# Patient Record
Sex: FEMALE | Race: WHITE | NOT HISPANIC OR LATINO | Employment: OTHER | URBAN - METROPOLITAN AREA
[De-identification: names, ages, dates, MRNs, and addresses within clinical notes are randomized per-mention and may not be internally consistent; named-entity substitution may affect disease eponyms.]

---

## 2017-11-07 RX ORDER — CLOBETASOL PROPIONATE 0.5 MG/G
CREAM TOPICAL 2 TIMES DAILY
Qty: 30 G | Refills: 0 | Status: SHIPPED | OUTPATIENT
Start: 2017-11-07 | End: 2018-01-11

## 2017-12-21 DIAGNOSIS — I48.91 ATRIAL FIBRILLATION, UNSPECIFIED TYPE: Primary | ICD-10-CM

## 2017-12-27 ENCOUNTER — TELEPHONE (OUTPATIENT)
Dept: INTERNAL MEDICINE | Facility: CLINIC | Age: 67
End: 2017-12-27

## 2017-12-27 NOTE — TELEPHONE ENCOUNTER
----- Message from Jordana Funez sent at 12/27/2017  3:28 PM CST -----  Contact: SARY SINGER [[id number]]  _  1st Request  _  2nd Request  _  3rd Request        Who:  SARY SINGER [[id number]]    Why: Patient states she would like a call back from Debra in regards to an appt    What Number to Call Back: [phone]    When to Expect a call back: (Before the end of the day)   -- if call after 3:00 call back will be tomorrow.

## 2018-01-08 ENCOUNTER — TELEPHONE (OUTPATIENT)
Dept: INTERNAL MEDICINE | Facility: CLINIC | Age: 68
End: 2018-01-08

## 2018-01-10 ENCOUNTER — TELEPHONE (OUTPATIENT)
Dept: INTERNAL MEDICINE | Facility: CLINIC | Age: 68
End: 2018-01-10

## 2018-01-10 DIAGNOSIS — Z12.39 SCREENING FOR BREAST CANCER: Primary | ICD-10-CM

## 2018-01-11 ENCOUNTER — LAB VISIT (OUTPATIENT)
Dept: LAB | Facility: OTHER | Age: 68
End: 2018-01-11
Attending: INTERNAL MEDICINE
Payer: MEDICARE

## 2018-01-11 ENCOUNTER — OFFICE VISIT (OUTPATIENT)
Dept: INTERNAL MEDICINE | Facility: CLINIC | Age: 68
End: 2018-01-11
Payer: COMMERCIAL

## 2018-01-11 VITALS
HEIGHT: 66 IN | DIASTOLIC BLOOD PRESSURE: 84 MMHG | HEART RATE: 71 BPM | WEIGHT: 209 LBS | SYSTOLIC BLOOD PRESSURE: 124 MMHG | BODY MASS INDEX: 33.59 KG/M2

## 2018-01-11 DIAGNOSIS — Z11.59 NEED FOR HEPATITIS C SCREENING TEST: ICD-10-CM

## 2018-01-11 DIAGNOSIS — I10 ESSENTIAL HYPERTENSION, BENIGN: Primary | Chronic | ICD-10-CM

## 2018-01-11 DIAGNOSIS — I48.0 PAROXYSMAL ATRIAL FIBRILLATION: Chronic | ICD-10-CM

## 2018-01-11 DIAGNOSIS — N95.9 POSTMENOPAUSAL SYMPTOMS: ICD-10-CM

## 2018-01-11 DIAGNOSIS — Z13.6 ENCOUNTER FOR LIPID SCREENING FOR CARDIOVASCULAR DISEASE: ICD-10-CM

## 2018-01-11 DIAGNOSIS — Z13.220 ENCOUNTER FOR LIPID SCREENING FOR CARDIOVASCULAR DISEASE: ICD-10-CM

## 2018-01-11 DIAGNOSIS — I10 ESSENTIAL HYPERTENSION, BENIGN: Chronic | ICD-10-CM

## 2018-01-11 DIAGNOSIS — Z12.31 ENCOUNTER FOR SCREENING MAMMOGRAM FOR BREAST CANCER: ICD-10-CM

## 2018-01-11 LAB
ALBUMIN SERPL BCP-MCNC: 3.8 G/DL
ALP SERPL-CCNC: 88 U/L
ALT SERPL W/O P-5'-P-CCNC: 19 U/L
ANION GAP SERPL CALC-SCNC: 8 MMOL/L
AST SERPL-CCNC: 19 U/L
BASOPHILS # BLD AUTO: 0.01 K/UL
BASOPHILS NFR BLD: 0.2 %
BILIRUB SERPL-MCNC: 0.4 MG/DL
BUN SERPL-MCNC: 16 MG/DL
CALCIUM SERPL-MCNC: 9 MG/DL
CHLORIDE SERPL-SCNC: 106 MMOL/L
CHOLEST SERPL-MCNC: 210 MG/DL
CHOLEST/HDLC SERPL: 3 {RATIO}
CO2 SERPL-SCNC: 21 MMOL/L
CREAT SERPL-MCNC: 0.8 MG/DL
DIFFERENTIAL METHOD: ABNORMAL
EOSINOPHIL # BLD AUTO: 0.1 K/UL
EOSINOPHIL NFR BLD: 1.1 %
ERYTHROCYTE [DISTWIDTH] IN BLOOD BY AUTOMATED COUNT: 14.6 %
EST. GFR  (AFRICAN AMERICAN): >60 ML/MIN/1.73 M^2
EST. GFR  (NON AFRICAN AMERICAN): >60 ML/MIN/1.73 M^2
GLUCOSE SERPL-MCNC: 93 MG/DL
HCT VFR BLD AUTO: 41.8 %
HCV AB SERPL QL IA: NEGATIVE
HDLC SERPL-MCNC: 69 MG/DL
HDLC SERPL: 32.9 %
HGB BLD-MCNC: 13.8 G/DL
LDLC SERPL CALC-MCNC: 126.4 MG/DL
LYMPHOCYTES # BLD AUTO: 1 K/UL
LYMPHOCYTES NFR BLD: 17.9 %
MCH RBC QN AUTO: 31.2 PG
MCHC RBC AUTO-ENTMCNC: 33 G/DL
MCV RBC AUTO: 94 FL
MONOCYTES # BLD AUTO: 0.4 K/UL
MONOCYTES NFR BLD: 8.1 %
NEUTROPHILS # BLD AUTO: 3.8 K/UL
NEUTROPHILS NFR BLD: 72.5 %
NONHDLC SERPL-MCNC: 141 MG/DL
PLATELET # BLD AUTO: 255 K/UL
PMV BLD AUTO: 9.9 FL
POTASSIUM SERPL-SCNC: 4.3 MMOL/L
PROT SERPL-MCNC: 7.5 G/DL
RBC # BLD AUTO: 4.43 M/UL
SODIUM SERPL-SCNC: 135 MMOL/L
TRIGL SERPL-MCNC: 73 MG/DL
TSH SERPL DL<=0.005 MIU/L-ACNC: 1.17 UIU/ML
WBC # BLD AUTO: 5.3 K/UL

## 2018-01-11 PROCEDURE — 99213 OFFICE O/P EST LOW 20 MIN: CPT | Mod: PBBFAC | Performed by: INTERNAL MEDICINE

## 2018-01-11 PROCEDURE — 85025 COMPLETE CBC W/AUTO DIFF WBC: CPT

## 2018-01-11 PROCEDURE — 99999 PR PBB SHADOW E&M-EST. PATIENT-LVL III: CPT | Mod: PBBFAC,,, | Performed by: INTERNAL MEDICINE

## 2018-01-11 PROCEDURE — 80053 COMPREHEN METABOLIC PANEL: CPT

## 2018-01-11 PROCEDURE — 86803 HEPATITIS C AB TEST: CPT

## 2018-01-11 PROCEDURE — 99204 OFFICE O/P NEW MOD 45 MIN: CPT | Mod: S$PBB,,, | Performed by: INTERNAL MEDICINE

## 2018-01-11 PROCEDURE — 84443 ASSAY THYROID STIM HORMONE: CPT

## 2018-01-11 PROCEDURE — 36415 COLL VENOUS BLD VENIPUNCTURE: CPT

## 2018-01-11 PROCEDURE — 80061 LIPID PANEL: CPT

## 2018-01-11 RX ORDER — DILTIAZEM HYDROCHLORIDE 120 MG/1
180 CAPSULE, COATED, EXTENDED RELEASE ORAL DAILY
COMMUNITY
End: 2022-12-27

## 2018-01-11 NOTE — PROGRESS NOTES
Subjective:       Patient ID: Rachelle Currie is a 67 y.o. female.    Chief Complaint: Annual Exam    Pt here to [facility]. She feels well currently and has no c/o. Counseled on exercise goals.     Pt's BP is well controlled. Tolerating meds well. Pt denies cp/sob/ha/vision or neuro changes. Checking at home and is well controlled.     She has pA-fib. Seeing EP in [place] but moving here. She has had 2 ablations but doesn't believe they were successful as she still has intermittent palpitations. She is anticoag with xarelto.     Due for mammo and labs. Other health maint is up to date.       Review of Systems   Constitutional: Negative for fatigue, fever and unexpected weight change.   HENT: Negative for congestion and sore throat.    Eyes: Negative for visual disturbance.   Respiratory: Negative for shortness of breath.    Cardiovascular: Positive for palpitations. Negative for chest pain and leg swelling.   Gastrointestinal: Negative for abdominal pain, blood in stool, constipation and diarrhea.   Genitourinary: Negative for dysuria.   Musculoskeletal: Negative for arthralgias.   Skin: Negative for rash.   Neurological: Negative for dizziness, syncope and headaches.   Hematological: Negative for adenopathy.   Psychiatric/Behavioral: Negative for dysphoric mood.       Objective:      Physical Exam   Constitutional: She is oriented to person, place, and time. She appears well-developed and well-nourished.   HENT:   Head: Normocephalic.   Right Ear: Tympanic membrane, external ear and ear canal normal.   Left Ear: Tympanic membrane, external ear and ear canal normal.   Nose: Nose normal.   Mouth/Throat: Uvula is midline and oropharynx is clear and moist.   Eyes: Conjunctivae, EOM and lids are normal. Pupils are equal, round, and reactive to light. Right conjunctiva is not injected. Left conjunctiva is not injected.   Neck: Neck supple. No JVD present. Carotid bruit is not present. No thyroid mass and no thyromegaly  present.   Cardiovascular: Normal rate, regular rhythm, S1 normal, S2 normal and intact distal pulses.  PMI is not displaced.    No murmur heard.  Pulses:       Posterior tibial pulses are 2+ on the right side, and 2+ on the left side.   Pulmonary/Chest: Effort normal and breath sounds normal. She has no wheezes. She has no rhonchi. She has no rales.   Abdominal: Soft. Bowel sounds are normal. She exhibits no distension and no abdominal bruit. There is no hepatosplenomegaly. There is no tenderness.   Musculoskeletal: She exhibits no edema.   Lymphadenopathy:        Head (right side): No preauricular and no posterior auricular adenopathy present.        Head (left side): No preauricular and no posterior auricular adenopathy present.     She has no cervical adenopathy.     She has no axillary adenopathy.   Neurological: She is alert and oriented to person, place, and time. She has normal strength. No cranial nerve deficit or sensory deficit.   Skin: Skin is warm. No rash noted.   Psychiatric: She has a normal mood and affect. Her speech is normal and behavior is normal. Judgment and thought content normal.       Assessment:       1. Essential hypertension, benign    2. Paroxysmal atrial fibrillation    3. Need for hepatitis C screening test    4. Postmenopausal symptoms    5. Encounter for lipid screening for cardiovascular disease    6. Encounter for screening mammogram for breast cancer        Plan:       1. Appropriate labs  2. Electrophysiology referral  3. DEXA  4. Mammogram  5. Records request

## 2019-03-07 ENCOUNTER — OFFICE VISIT (OUTPATIENT)
Dept: INTERNAL MEDICINE | Facility: CLINIC | Age: 69
End: 2019-03-07
Payer: MEDICARE

## 2019-03-07 VITALS
DIASTOLIC BLOOD PRESSURE: 68 MMHG | HEIGHT: 66 IN | BODY MASS INDEX: 34.37 KG/M2 | HEART RATE: 94 BPM | TEMPERATURE: 98 F | WEIGHT: 213.88 LBS | OXYGEN SATURATION: 98 % | SYSTOLIC BLOOD PRESSURE: 130 MMHG

## 2019-03-07 DIAGNOSIS — S70.02XA CONTUSION OF LEFT HIP REGION: Primary | ICD-10-CM

## 2019-03-07 PROCEDURE — 99999 PR PBB SHADOW E&M-EST. PATIENT-LVL III: ICD-10-PCS | Mod: PBBFAC,,, | Performed by: INTERNAL MEDICINE

## 2019-03-07 PROCEDURE — 99213 OFFICE O/P EST LOW 20 MIN: CPT | Mod: S$PBB,,, | Performed by: INTERNAL MEDICINE

## 2019-03-07 PROCEDURE — 99213 PR OFFICE/OUTPT VISIT, EST, LEVL III, 20-29 MIN: ICD-10-PCS | Mod: S$PBB,,, | Performed by: INTERNAL MEDICINE

## 2019-03-07 PROCEDURE — 99213 OFFICE O/P EST LOW 20 MIN: CPT | Mod: PBBFAC | Performed by: INTERNAL MEDICINE

## 2019-03-07 PROCEDURE — 99999 PR PBB SHADOW E&M-EST. PATIENT-LVL III: CPT | Mod: PBBFAC,,, | Performed by: INTERNAL MEDICINE

## 2019-03-07 NOTE — PROGRESS NOTES
Subjective:       Patient ID: Rachelle Currie is a 69 y.o. female.    Chief Complaint: Fall    Patient gives convoluted story about falling in her condo on a wet floor 2 days ago.  She landed on her left side.  Did not hit her head.  Takes xaralto for a fib.  Primary home is in [place].  No LOC.  Has vague pain in left flank at pelvic rim.  No problems walking      Review of Systems   Constitutional: Negative for activity change, chills, fatigue and fever.   HENT: Negative for congestion, ear pain, nosebleeds, postnasal drip, sinus pressure and sore throat.    Eyes: Negative.  Negative for visual disturbance.   Respiratory: Negative for cough, chest tightness, shortness of breath and wheezing.    Cardiovascular: Negative for chest pain.   Gastrointestinal: Negative for abdominal pain, diarrhea, nausea and vomiting.   Genitourinary: Negative for difficulty urinating, dysuria, frequency and urgency.   Musculoskeletal: Negative for arthralgias and neck stiffness.   Skin: Negative for rash.   Neurological: Negative for dizziness, weakness and headaches.   Psychiatric/Behavioral: Negative for sleep disturbance. The patient is not nervous/anxious.        Objective:      Physical Exam   Musculoskeletal:        Back:        Assessment:       1. Contusion of left hip region        Plan:   Rachelle was seen today for fall.    Diagnoses and all orders for this visit:    Contusion of left hip region

## 2021-01-16 ENCOUNTER — IMMUNIZATION (OUTPATIENT)
Dept: FAMILY MEDICINE | Facility: CLINIC | Age: 71
End: 2021-01-16
Payer: MEDICARE

## 2021-01-16 DIAGNOSIS — Z23 NEED FOR VACCINATION: Primary | ICD-10-CM

## 2021-01-16 PROCEDURE — 91300 COVID-19, MRNA, LNP-S, PF, 30 MCG/0.3 ML DOSE VACCINE: CPT | Mod: PBBFAC,PN | Performed by: NURSE ANESTHETIST, CERTIFIED REGISTERED

## 2021-02-04 ENCOUNTER — PATIENT MESSAGE (OUTPATIENT)
Dept: UROLOGY | Facility: CLINIC | Age: 71
End: 2021-02-04

## 2021-02-06 ENCOUNTER — IMMUNIZATION (OUTPATIENT)
Dept: FAMILY MEDICINE | Facility: CLINIC | Age: 71
End: 2021-02-06
Payer: MEDICARE

## 2021-02-06 DIAGNOSIS — Z23 NEED FOR VACCINATION: Primary | ICD-10-CM

## 2021-02-06 PROCEDURE — 0002A COVID-19, MRNA, LNP-S, PF, 30 MCG/0.3 ML DOSE VACCINE: CPT | Mod: PBBFAC | Performed by: FAMILY MEDICINE

## 2021-02-06 PROCEDURE — 91300 COVID-19, MRNA, LNP-S, PF, 30 MCG/0.3 ML DOSE VACCINE: CPT | Mod: PBBFAC | Performed by: FAMILY MEDICINE

## 2021-03-30 ENCOUNTER — LAB VISIT (OUTPATIENT)
Dept: LAB | Facility: OTHER | Age: 71
End: 2021-03-30
Attending: INTERNAL MEDICINE
Payer: MEDICARE

## 2021-03-30 DIAGNOSIS — R35.0 URINARY FREQUENCY: ICD-10-CM

## 2021-03-30 LAB
BACTERIA #/AREA URNS HPF: ABNORMAL /HPF
BILIRUB UR QL STRIP: ABNORMAL
CLARITY UR: ABNORMAL
COLOR UR: YELLOW
GLUCOSE UR QL STRIP: NEGATIVE
HGB UR QL STRIP: ABNORMAL
HYALINE CASTS #/AREA URNS LPF: 0 /LPF
KETONES UR QL STRIP: ABNORMAL
LEUKOCYTE ESTERASE UR QL STRIP: ABNORMAL
MICROSCOPIC COMMENT: ABNORMAL
NITRITE UR QL STRIP: NEGATIVE
PH UR STRIP: 6 [PH] (ref 5–8)
PROT UR QL STRIP: ABNORMAL
RBC #/AREA URNS HPF: 4 /HPF (ref 0–4)
SP GR UR STRIP: 1.02 (ref 1–1.03)
SQUAMOUS #/AREA URNS HPF: 6 /HPF
URN SPEC COLLECT METH UR: ABNORMAL
WBC #/AREA URNS HPF: >100 /HPF (ref 0–5)

## 2021-03-30 PROCEDURE — 87088 URINE BACTERIA CULTURE: CPT | Performed by: INTERNAL MEDICINE

## 2021-03-30 PROCEDURE — 87086 URINE CULTURE/COLONY COUNT: CPT | Performed by: INTERNAL MEDICINE

## 2021-03-30 PROCEDURE — 81000 URINALYSIS NONAUTO W/SCOPE: CPT | Performed by: INTERNAL MEDICINE

## 2021-03-30 PROCEDURE — 87186 SC STD MICRODIL/AGAR DIL: CPT | Performed by: INTERNAL MEDICINE

## 2021-03-30 PROCEDURE — 87077 CULTURE AEROBIC IDENTIFY: CPT | Performed by: INTERNAL MEDICINE

## 2021-04-03 LAB — BACTERIA UR CULT: ABNORMAL

## 2021-06-21 ENCOUNTER — LAB VISIT (OUTPATIENT)
Dept: LAB | Facility: OTHER | Age: 71
End: 2021-06-21
Attending: INTERNAL MEDICINE
Payer: MEDICARE

## 2021-06-21 DIAGNOSIS — R30.0 DYSURIA: ICD-10-CM

## 2021-06-21 LAB
BACTERIA #/AREA URNS HPF: ABNORMAL /HPF
BILIRUB UR QL STRIP: NEGATIVE
CLARITY UR: CLEAR
COLOR UR: YELLOW
GLUCOSE UR QL STRIP: NEGATIVE
HGB UR QL STRIP: ABNORMAL
KETONES UR QL STRIP: NEGATIVE
LEUKOCYTE ESTERASE UR QL STRIP: ABNORMAL
MICROSCOPIC COMMENT: ABNORMAL
NITRITE UR QL STRIP: NEGATIVE
PH UR STRIP: 7 [PH] (ref 5–8)
PROT UR QL STRIP: NEGATIVE
RBC #/AREA URNS HPF: 14 /HPF (ref 0–4)
SP GR UR STRIP: 1.01 (ref 1–1.03)
SQUAMOUS #/AREA URNS HPF: 2 /HPF
URN SPEC COLLECT METH UR: ABNORMAL
WBC #/AREA URNS HPF: 4 /HPF (ref 0–5)
WBC CLUMPS URNS QL MICRO: ABNORMAL

## 2021-06-21 PROCEDURE — 81000 URINALYSIS NONAUTO W/SCOPE: CPT | Performed by: INTERNAL MEDICINE

## 2021-06-25 ENCOUNTER — TELEPHONE (OUTPATIENT)
Dept: INTERNAL MEDICINE | Facility: CLINIC | Age: 71
End: 2021-06-25

## 2021-09-05 ENCOUNTER — HOSPITAL ENCOUNTER (EMERGENCY)
Facility: HOSPITAL | Age: 71
Discharge: HOME | End: 2021-09-05
Attending: EMERGENCY MEDICINE
Payer: MEDICARE

## 2021-09-05 VITALS
TEMPERATURE: 98 F | WEIGHT: 195 LBS | RESPIRATION RATE: 18 BRPM | HEIGHT: 66 IN | DIASTOLIC BLOOD PRESSURE: 85 MMHG | SYSTOLIC BLOOD PRESSURE: 127 MMHG | OXYGEN SATURATION: 99 % | BODY MASS INDEX: 31.34 KG/M2 | HEART RATE: 81 BPM

## 2021-09-05 DIAGNOSIS — K64.9 HEMORRHOIDS, UNSPECIFIED HEMORRHOID TYPE: ICD-10-CM

## 2021-09-05 DIAGNOSIS — I48.0 PAROXYSMAL ATRIAL FIBRILLATION: Chronic | ICD-10-CM

## 2021-09-05 DIAGNOSIS — I10 ESSENTIAL HYPERTENSION, BENIGN: Chronic | ICD-10-CM

## 2021-09-05 DIAGNOSIS — K60.2 ANAL FISSURE: ICD-10-CM

## 2021-09-05 DIAGNOSIS — Z20.822 COVID-19 VIRUS NOT DETECTED: Primary | ICD-10-CM

## 2021-09-05 DIAGNOSIS — N30.01 ACUTE CYSTITIS WITH HEMATURIA: ICD-10-CM

## 2021-09-05 DIAGNOSIS — R68.89 MULTIPLE COMPLAINTS: ICD-10-CM

## 2021-09-05 LAB
ALBUMIN SERPL BCP-MCNC: 2.7 G/DL (ref 3.5–5.2)
ALP SERPL-CCNC: 170 U/L (ref 55–135)
ALT SERPL W/O P-5'-P-CCNC: 114 U/L (ref 10–44)
ANION GAP SERPL CALC-SCNC: 15 MMOL/L (ref 8–16)
AST SERPL-CCNC: 54 U/L (ref 10–40)
BACTERIA #/AREA URNS AUTO: ABNORMAL /HPF
BASOPHILS # BLD AUTO: 0.03 K/UL (ref 0–0.2)
BASOPHILS NFR BLD: 0.3 % (ref 0–1.9)
BILIRUB SERPL-MCNC: 0.5 MG/DL (ref 0.1–1)
BILIRUB UR QL STRIP: NEGATIVE
BUN SERPL-MCNC: 32 MG/DL (ref 8–23)
BUN SERPL-MCNC: 45 MG/DL (ref 6–30)
CALCIUM SERPL-MCNC: 9 MG/DL (ref 8.7–10.5)
CHLORIDE SERPL-SCNC: 100 MMOL/L (ref 95–110)
CHLORIDE SERPL-SCNC: 99 MMOL/L (ref 95–110)
CK SERPL-CCNC: 59 U/L (ref 20–180)
CLARITY UR REFRACT.AUTO: ABNORMAL
CO2 SERPL-SCNC: 18 MMOL/L (ref 23–29)
COLOR UR AUTO: YELLOW
CREAT SERPL-MCNC: 1.4 MG/DL (ref 0.5–1.4)
CREAT SERPL-MCNC: 1.4 MG/DL (ref 0.5–1.4)
CTP QC/QA: YES
DIFFERENTIAL METHOD: ABNORMAL
EOSINOPHIL # BLD AUTO: 0 K/UL (ref 0–0.5)
EOSINOPHIL NFR BLD: 0.3 % (ref 0–8)
ERYTHROCYTE [DISTWIDTH] IN BLOOD BY AUTOMATED COUNT: 13.8 % (ref 11.5–14.5)
EST. GFR  (AFRICAN AMERICAN): 43.9 ML/MIN/1.73 M^2
EST. GFR  (NON AFRICAN AMERICAN): 38.1 ML/MIN/1.73 M^2
GLUCOSE SERPL-MCNC: 102 MG/DL (ref 70–110)
GLUCOSE SERPL-MCNC: 98 MG/DL (ref 70–110)
GLUCOSE UR QL STRIP: NEGATIVE
HCT VFR BLD AUTO: 41.3 % (ref 37–48.5)
HCT VFR BLD CALC: 43 %PCV (ref 36–54)
HCV AB SERPL QL IA: NEGATIVE
HGB BLD-MCNC: 14.1 G/DL (ref 12–16)
HGB UR QL STRIP: ABNORMAL
IMM GRANULOCYTES # BLD AUTO: 0.12 K/UL (ref 0–0.04)
IMM GRANULOCYTES NFR BLD AUTO: 1 % (ref 0–0.5)
KETONES UR QL STRIP: NEGATIVE
LEUKOCYTE ESTERASE UR QL STRIP: ABNORMAL
LIPASE SERPL-CCNC: 24 U/L (ref 4–60)
LYMPHOCYTES # BLD AUTO: 2.6 K/UL (ref 1–4.8)
LYMPHOCYTES NFR BLD: 21.8 % (ref 18–48)
MCH RBC QN AUTO: 30.7 PG (ref 27–31)
MCHC RBC AUTO-ENTMCNC: 34.1 G/DL (ref 32–36)
MCV RBC AUTO: 90 FL (ref 82–98)
MICROSCOPIC COMMENT: ABNORMAL
MONOCYTES # BLD AUTO: 0.9 K/UL (ref 0.3–1)
MONOCYTES NFR BLD: 7.2 % (ref 4–15)
NEUTROPHILS # BLD AUTO: 8.1 K/UL (ref 1.8–7.7)
NEUTROPHILS NFR BLD: 69.4 % (ref 38–73)
NITRITE UR QL STRIP: NEGATIVE
NRBC BLD-RTO: 0 /100 WBC
PH UR STRIP: 6 [PH] (ref 5–8)
PLATELET # BLD AUTO: 316 K/UL (ref 150–450)
PLATELET BLD QL SMEAR: ABNORMAL
PMV BLD AUTO: 10 FL (ref 9.2–12.9)
POC IONIZED CALCIUM: 0.98 MMOL/L (ref 1.06–1.42)
POC TCO2 (MEASURED): 22 MMOL/L (ref 23–29)
POTASSIUM BLD-SCNC: 4 MMOL/L (ref 3.5–5.1)
POTASSIUM SERPL-SCNC: 3.4 MMOL/L (ref 3.5–5.1)
PROT SERPL-MCNC: 7.4 G/DL (ref 6–8.4)
PROT UR QL STRIP: NEGATIVE
RBC # BLD AUTO: 4.59 M/UL (ref 4–5.4)
RBC #/AREA URNS AUTO: 18 /HPF (ref 0–4)
SAMPLE: ABNORMAL
SARS-COV-2 RDRP RESP QL NAA+PROBE: NEGATIVE
SODIUM BLD-SCNC: 133 MMOL/L (ref 136–145)
SODIUM SERPL-SCNC: 132 MMOL/L (ref 136–145)
SP GR UR STRIP: 1.01 (ref 1–1.03)
TROPONIN I SERPL DL<=0.01 NG/ML-MCNC: <0.006 NG/ML (ref 0–0.03)
URN SPEC COLLECT METH UR: ABNORMAL
WBC # BLD AUTO: 11.73 K/UL (ref 3.9–12.7)
WBC #/AREA URNS AUTO: 81 /HPF (ref 0–5)
WBC CLUMPS UR QL AUTO: ABNORMAL

## 2021-09-05 PROCEDURE — 93010 ELECTROCARDIOGRAM REPORT: CPT | Mod: ,,, | Performed by: INTERNAL MEDICINE

## 2021-09-05 PROCEDURE — 80053 COMPREHEN METABOLIC PANEL: CPT | Performed by: EMERGENCY MEDICINE

## 2021-09-05 PROCEDURE — 84484 ASSAY OF TROPONIN QUANT: CPT | Performed by: EMERGENCY MEDICINE

## 2021-09-05 PROCEDURE — 99284 PR EMERGENCY DEPT VISIT,LEVEL IV: ICD-10-PCS | Mod: CS,,, | Performed by: EMERGENCY MEDICINE

## 2021-09-05 PROCEDURE — 87186 SC STD MICRODIL/AGAR DIL: CPT | Performed by: EMERGENCY MEDICINE

## 2021-09-05 PROCEDURE — 93010 EKG 12-LEAD: ICD-10-PCS | Mod: 76,,, | Performed by: INTERNAL MEDICINE

## 2021-09-05 PROCEDURE — 93010 ELECTROCARDIOGRAM REPORT: CPT | Mod: 76,,, | Performed by: INTERNAL MEDICINE

## 2021-09-05 PROCEDURE — 87088 URINE BACTERIA CULTURE: CPT | Performed by: EMERGENCY MEDICINE

## 2021-09-05 PROCEDURE — 99284 EMERGENCY DEPT VISIT MOD MDM: CPT | Mod: CS,,, | Performed by: EMERGENCY MEDICINE

## 2021-09-05 PROCEDURE — 83690 ASSAY OF LIPASE: CPT | Performed by: EMERGENCY MEDICINE

## 2021-09-05 PROCEDURE — 96361 HYDRATE IV INFUSION ADD-ON: CPT

## 2021-09-05 PROCEDURE — 93005 ELECTROCARDIOGRAM TRACING: CPT

## 2021-09-05 PROCEDURE — 87086 URINE CULTURE/COLONY COUNT: CPT | Performed by: EMERGENCY MEDICINE

## 2021-09-05 PROCEDURE — 96374 THER/PROPH/DIAG INJ IV PUSH: CPT

## 2021-09-05 PROCEDURE — 82550 ASSAY OF CK (CPK): CPT | Performed by: EMERGENCY MEDICINE

## 2021-09-05 PROCEDURE — 80047 BASIC METABLC PNL IONIZED CA: CPT

## 2021-09-05 PROCEDURE — 81001 URINALYSIS AUTO W/SCOPE: CPT | Performed by: EMERGENCY MEDICINE

## 2021-09-05 PROCEDURE — 82330 ASSAY OF CALCIUM: CPT

## 2021-09-05 PROCEDURE — 85025 COMPLETE CBC W/AUTO DIFF WBC: CPT | Performed by: EMERGENCY MEDICINE

## 2021-09-05 PROCEDURE — 86803 HEPATITIS C AB TEST: CPT | Performed by: EMERGENCY MEDICINE

## 2021-09-05 PROCEDURE — 87077 CULTURE AEROBIC IDENTIFY: CPT | Performed by: EMERGENCY MEDICINE

## 2021-09-05 PROCEDURE — 99284 EMERGENCY DEPT VISIT MOD MDM: CPT | Mod: 25

## 2021-09-05 PROCEDURE — U0002 COVID-19 LAB TEST NON-CDC: HCPCS | Performed by: EMERGENCY MEDICINE

## 2021-09-05 PROCEDURE — 63600175 PHARM REV CODE 636 W HCPCS: Performed by: EMERGENCY MEDICINE

## 2021-09-05 RX ORDER — NITROFURANTOIN 25; 75 MG/1; MG/1
100 CAPSULE ORAL 2 TIMES DAILY
Qty: 10 CAPSULE | Refills: 0 | Status: SHIPPED | OUTPATIENT
Start: 2021-09-05 | End: 2021-09-10

## 2021-09-05 RX ORDER — METOPROLOL SUCCINATE 25 MG/1
25 TABLET, EXTENDED RELEASE ORAL DAILY
COMMUNITY
End: 2023-03-27 | Stop reason: DRUGHIGH

## 2021-09-05 RX ORDER — ROSUVASTATIN CALCIUM 40 MG/1
10 TABLET, COATED ORAL NIGHTLY
COMMUNITY
End: 2023-11-28 | Stop reason: SDUPTHER

## 2021-09-05 RX ORDER — FAMOTIDINE 20 MG/1
20 TABLET, FILM COATED ORAL 2 TIMES DAILY
Qty: 10 TABLET | Refills: 0 | Status: SHIPPED | OUTPATIENT
Start: 2021-09-05 | End: 2022-12-27

## 2021-09-05 RX ORDER — ONDANSETRON 2 MG/ML
4 INJECTION INTRAMUSCULAR; INTRAVENOUS
Status: COMPLETED | OUTPATIENT
Start: 2021-09-05 | End: 2021-09-05

## 2021-09-05 RX ADMIN — ONDANSETRON 4 MG: 2 INJECTION INTRAMUSCULAR; INTRAVENOUS at 01:09

## 2021-09-05 RX ADMIN — SODIUM CHLORIDE, SODIUM LACTATE, POTASSIUM CHLORIDE, AND CALCIUM CHLORIDE 500 ML: .6; .31; .03; .02 INJECTION, SOLUTION INTRAVENOUS at 01:09

## 2021-09-07 LAB — BACTERIA UR CULT: ABNORMAL

## 2022-04-25 ENCOUNTER — INFUSION (OUTPATIENT)
Dept: INFECTIOUS DISEASES | Facility: HOSPITAL | Age: 72
End: 2022-04-25
Attending: INTERNAL MEDICINE
Payer: MEDICARE

## 2022-04-25 VITALS
OXYGEN SATURATION: 96 % | HEART RATE: 93 BPM | SYSTOLIC BLOOD PRESSURE: 137 MMHG | HEIGHT: 66 IN | DIASTOLIC BLOOD PRESSURE: 84 MMHG | WEIGHT: 209 LBS | RESPIRATION RATE: 18 BRPM | BODY MASS INDEX: 33.59 KG/M2 | TEMPERATURE: 98 F

## 2022-04-25 DIAGNOSIS — U07.1 COVID-19: Primary | ICD-10-CM

## 2022-04-25 PROCEDURE — M0222 HC IV INJECTION, BEBTELOVIMAB, INCL POST ADMIN MONIT: HCPCS | Performed by: INTERNAL MEDICINE

## 2022-04-25 PROCEDURE — 63600175 PHARM REV CODE 636 W HCPCS: Performed by: INTERNAL MEDICINE

## 2022-04-25 RX ORDER — BEBTELOVIMAB 87.5 MG/ML
175 INJECTION, SOLUTION INTRAVENOUS
Status: COMPLETED | OUTPATIENT
Start: 2022-04-25 | End: 2022-04-25

## 2022-04-25 RX ORDER — EPINEPHRINE 0.3 MG/.3ML
0.3 INJECTION SUBCUTANEOUS
Status: ACTIVE | OUTPATIENT
Start: 2022-04-25 | End: 2022-04-28

## 2022-04-25 RX ORDER — DIPHENHYDRAMINE HYDROCHLORIDE 50 MG/ML
25 INJECTION INTRAMUSCULAR; INTRAVENOUS
Status: ACTIVE | OUTPATIENT
Start: 2022-04-25 | End: 2022-04-26

## 2022-04-25 RX ORDER — ALBUTEROL SULFATE 90 UG/1
2 AEROSOL, METERED RESPIRATORY (INHALATION)
Status: ACTIVE | OUTPATIENT
Start: 2022-04-25 | End: 2022-04-28

## 2022-04-25 RX ORDER — ACETAMINOPHEN 325 MG/1
650 TABLET ORAL
Status: ACTIVE | OUTPATIENT
Start: 2022-04-25 | End: 2022-04-26

## 2022-04-25 RX ORDER — ONDANSETRON 4 MG/1
4 TABLET, ORALLY DISINTEGRATING ORAL
Status: ACTIVE | OUTPATIENT
Start: 2022-04-25 | End: 2022-04-26

## 2022-04-25 RX ADMIN — BEBTELOVIMAB 175 MG: 87.5 INJECTION, SOLUTION INTRAVENOUS at 02:04

## 2022-04-25 NOTE — PROGRESS NOTES
Patient arrives for Bebtelovimab IV Injection. Ambulatory. Pt AAox3. No distress noted. RR even and unlabored.     Symptoms and onset date:  Head cold symptoms, decrease energy, cough, sneezing. Onset 4/24/22.      Tested COVID + on 4/25/22.

## 2022-04-25 NOTE — PROGRESS NOTES
Patient remains with no signs of complications noted. Patient received Bebtelovimab IV Injection according to FDA recommendations and [facility] SOP without complications noted and left with mask in place. Drug fact sheet provided. Pt discharged home. Ambulatory. Remains AAox3. No distress noted. RR even and unlabored.

## 2022-04-25 NOTE — PROGRESS NOTES
Bebtelovimab IV Injection administered. Pt tolerated injection well. No S/S of injection reaction noted at present time. One hour observation started.

## 2022-10-21 ENCOUNTER — TELEPHONE (OUTPATIENT)
Dept: INTERNAL MEDICINE | Facility: CLINIC | Age: 72
End: 2022-10-21
Payer: MEDICARE

## 2022-10-21 ENCOUNTER — OFFICE VISIT (OUTPATIENT)
Dept: URGENT CARE | Facility: CLINIC | Age: 72
End: 2022-10-21
Payer: MEDICARE

## 2022-10-21 ENCOUNTER — HOSPITAL ENCOUNTER (EMERGENCY)
Dept: RADIOLOGY | Facility: OTHER | Age: 72
Discharge: HOME | End: 2022-10-21
Attending: INTERNAL MEDICINE
Payer: MEDICARE

## 2022-10-21 VITALS
TEMPERATURE: 97 F | RESPIRATION RATE: 16 BRPM | DIASTOLIC BLOOD PRESSURE: 88 MMHG | SYSTOLIC BLOOD PRESSURE: 134 MMHG | HEART RATE: 96 BPM | HEIGHT: 66 IN | OXYGEN SATURATION: 97 % | WEIGHT: 230 LBS | BODY MASS INDEX: 36.96 KG/M2

## 2022-10-21 DIAGNOSIS — S09.90XA INJURY OF HEAD, INITIAL ENCOUNTER: Primary | ICD-10-CM

## 2022-10-21 DIAGNOSIS — I48.0 PAROXYSMAL ATRIAL FIBRILLATION: Chronic | ICD-10-CM

## 2022-10-21 DIAGNOSIS — S09.90XA HEAD TRAUMA, INITIAL ENCOUNTER: ICD-10-CM

## 2022-10-21 DIAGNOSIS — Z79.01 CHRONIC ANTICOAGULATION: ICD-10-CM

## 2022-10-21 PROBLEM — Z98.890 S/P ABLATION OF ATRIAL FIBRILLATION: Status: ACTIVE | Noted: 2017-01-27

## 2022-10-21 PROBLEM — Z86.79 S/P ABLATION OF ATRIAL FIBRILLATION: Status: ACTIVE | Noted: 2017-01-27

## 2022-10-21 PROBLEM — M85.80 OSTEOPENIA: Status: ACTIVE | Noted: 2022-03-10

## 2022-10-21 PROBLEM — Z86.79 S/P ABLATION OF ATRIAL FLUTTER: Status: ACTIVE | Noted: 2018-12-10

## 2022-10-21 PROBLEM — Z92.89 HISTORY OF CARDIOVERSION: Status: ACTIVE | Noted: 2018-12-10

## 2022-10-21 PROBLEM — R73.9 HYPERGLYCEMIA: Status: ACTIVE | Noted: 2022-01-18

## 2022-10-21 PROBLEM — I50.42 CHRONIC COMBINED SYSTOLIC AND DIASTOLIC HEART FAILURE, NYHA CLASS 3: Status: ACTIVE | Noted: 2022-08-29

## 2022-10-21 PROBLEM — M72.2 PLANTAR FASCIITIS: Status: ACTIVE | Noted: 2020-10-15

## 2022-10-21 PROBLEM — I42.8 NONISCHEMIC CARDIOMYOPATHY: Status: ACTIVE | Noted: 2018-12-10

## 2022-10-21 PROBLEM — I48.91 A-FIB: Status: ACTIVE | Noted: 2020-10-15

## 2022-10-21 PROBLEM — E66.01 OBESITY, MORBID: Status: ACTIVE | Noted: 2022-07-26

## 2022-10-21 PROBLEM — E78.5 HYPERLIPIDEMIA: Status: ACTIVE | Noted: 2022-01-19

## 2022-10-21 PROBLEM — Z98.890 S/P ABLATION OF ATRIAL FLUTTER: Status: ACTIVE | Noted: 2018-12-10

## 2022-10-21 PROCEDURE — 70450 CT HEAD/BRAIN W/O DYE: CPT | Mod: 26,,, | Performed by: RADIOLOGY

## 2022-10-21 PROCEDURE — 3079F DIAST BP 80-89 MM HG: CPT | Mod: CPTII,S$GLB,, | Performed by: FAMILY MEDICINE

## 2022-10-21 PROCEDURE — 99499 UNLISTED E&M SERVICE: CPT | Mod: S$GLB,,, | Performed by: FAMILY MEDICINE

## 2022-10-21 PROCEDURE — 99499 NO LOS: ICD-10-PCS | Mod: S$GLB,,, | Performed by: FAMILY MEDICINE

## 2022-10-21 PROCEDURE — 1160F RVW MEDS BY RX/DR IN RCRD: CPT | Mod: CPTII,S$GLB,, | Performed by: FAMILY MEDICINE

## 2022-10-21 PROCEDURE — 3079F PR MOST RECENT DIASTOLIC BLOOD PRESSURE 80-89 MM HG: ICD-10-PCS | Mod: CPTII,S$GLB,, | Performed by: FAMILY MEDICINE

## 2022-10-21 PROCEDURE — 1160F PR REVIEW ALL MEDS BY PRESCRIBER/CLIN PHARMACIST DOCUMENTED: ICD-10-PCS | Mod: CPTII,S$GLB,, | Performed by: FAMILY MEDICINE

## 2022-10-21 PROCEDURE — 4010F PR ACE/ARB THEARPY RXD/TAKEN: ICD-10-PCS | Mod: CPTII,S$GLB,, | Performed by: FAMILY MEDICINE

## 2022-10-21 PROCEDURE — 3075F SYST BP GE 130 - 139MM HG: CPT | Mod: CPTII,S$GLB,, | Performed by: FAMILY MEDICINE

## 2022-10-21 PROCEDURE — 70450 CT HEAD/BRAIN W/O DYE: CPT | Mod: TC

## 2022-10-21 PROCEDURE — 4010F ACE/ARB THERAPY RXD/TAKEN: CPT | Mod: CPTII,S$GLB,, | Performed by: FAMILY MEDICINE

## 2022-10-21 PROCEDURE — 70450 CT HEAD WITHOUT CONTRAST: ICD-10-PCS | Mod: 26,,, | Performed by: RADIOLOGY

## 2022-10-21 PROCEDURE — 1159F MED LIST DOCD IN RCRD: CPT | Mod: CPTII,S$GLB,, | Performed by: FAMILY MEDICINE

## 2022-10-21 PROCEDURE — 1159F PR MEDICATION LIST DOCUMENTED IN MEDICAL RECORD: ICD-10-PCS | Mod: CPTII,S$GLB,, | Performed by: FAMILY MEDICINE

## 2022-10-21 PROCEDURE — 3075F PR MOST RECENT SYSTOLIC BLOOD PRESS GE 130-139MM HG: ICD-10-PCS | Mod: CPTII,S$GLB,, | Performed by: FAMILY MEDICINE

## 2022-10-21 NOTE — PROGRESS NOTES
"Subjective:       Patient ID: Rachelle Currie is a 72 y.o. female.    Vitals:  height is 5' 6" (1.676 m) and weight is 104.3 kg (230 lb). Her temperature is 97 °F (36.1 °C). Her blood pressure is 134/88 and her pulse is 96. Her respiration is 16 and oxygen saturation is 97%.     Chief Complaint: Fall (/)    Patient fall at the store and she hurt her right shoulder , head and right leg   patient is  dizzy since she fell     Fall  The accident occurred Less than 1 hour ago. Fall occurred: seat/cart. The point of impact was the head, right shoulder and right hip. The pain is present in the head and right shoulder. The pain is at a severity of 8/10. The pain is moderate. Associated symptoms include tingling. Pertinent negatives include no abdominal pain, bowel incontinence, fever, headaches, hearing loss, hematuria, loss of consciousness, nausea, numbness, visual change or vomiting. She has tried nothing for the symptoms.     Constitution: Negative for fever.   Gastrointestinal:  Negative for abdominal pain, nausea, vomiting and bowel incontinence.   Genitourinary:  Negative for hematuria.   Neurological:  Negative for headaches, loss of consciousness and numbness.     Objective:      Physical Exam   Constitutional: She is oriented to person, place, and time.   Neurological: She is alert and oriented to person, place, and time.   Skin: Skin is warm and dry.   Psychiatric: Her behavior is normal. Judgment and thought content normal.   Nursing note and vitals reviewed.      Assessment:       1. Injury of head, initial encounter    2. Paroxysmal atrial fibrillation    3. Chronic anticoagulation          Plan:         Injury of head, initial encounter    Paroxysmal atrial fibrillation    Chronic anticoagulation       Pt sent to ED for CT and full eval   -I phoned ed and gave report to nurse         "

## 2022-11-14 ENCOUNTER — TELEPHONE (OUTPATIENT)
Dept: TRANSPLANT | Facility: CLINIC | Age: 72
End: 2022-11-14
Payer: MEDICARE

## 2022-11-14 DIAGNOSIS — I50.9 CONGESTIVE HEART FAILURE, UNSPECIFIED HF CHRONICITY, UNSPECIFIED HEART FAILURE TYPE: Primary | ICD-10-CM

## 2022-12-27 ENCOUNTER — LAB VISIT (OUTPATIENT)
Dept: LAB | Facility: HOSPITAL | Age: 72
End: 2022-12-27
Attending: INTERNAL MEDICINE
Payer: MEDICARE

## 2022-12-27 ENCOUNTER — OFFICE VISIT (OUTPATIENT)
Dept: TRANSPLANT | Facility: CLINIC | Age: 72
End: 2022-12-27
Payer: MEDICARE

## 2022-12-27 ENCOUNTER — TELEPHONE (OUTPATIENT)
Dept: CARDIOLOGY | Facility: HOSPITAL | Age: 72
End: 2022-12-27
Payer: MEDICARE

## 2022-12-27 VITALS
HEART RATE: 90 BPM | SYSTOLIC BLOOD PRESSURE: 110 MMHG | DIASTOLIC BLOOD PRESSURE: 55 MMHG | HEIGHT: 66 IN | BODY MASS INDEX: 37.09 KG/M2 | OXYGEN SATURATION: 98 % | WEIGHT: 230.81 LBS

## 2022-12-27 DIAGNOSIS — I50.9 CONGESTIVE HEART FAILURE, UNSPECIFIED HF CHRONICITY, UNSPECIFIED HEART FAILURE TYPE: ICD-10-CM

## 2022-12-27 DIAGNOSIS — I48.0 PAROXYSMAL ATRIAL FIBRILLATION: Chronic | ICD-10-CM

## 2022-12-27 DIAGNOSIS — I50.42 CHRONIC COMBINED SYSTOLIC AND DIASTOLIC HEART FAILURE, NYHA CLASS 3: ICD-10-CM

## 2022-12-27 DIAGNOSIS — Z98.890 S/P ABLATION OF ATRIAL FIBRILLATION: ICD-10-CM

## 2022-12-27 DIAGNOSIS — I48.0 PAROXYSMAL ATRIAL FIBRILLATION: Primary | Chronic | ICD-10-CM

## 2022-12-27 DIAGNOSIS — I48.91 ATRIAL FIBRILLATION WITH CONTROLLED VENTRICULAR RESPONSE: ICD-10-CM

## 2022-12-27 DIAGNOSIS — Z86.79 S/P ABLATION OF ATRIAL FIBRILLATION: ICD-10-CM

## 2022-12-27 DIAGNOSIS — I42.8 NONISCHEMIC CARDIOMYOPATHY: Primary | ICD-10-CM

## 2022-12-27 LAB
ALBUMIN SERPL BCP-MCNC: 3.7 G/DL (ref 3.5–5.2)
ALP SERPL-CCNC: 93 U/L (ref 55–135)
ALT SERPL W/O P-5'-P-CCNC: 32 U/L (ref 10–44)
ANION GAP SERPL CALC-SCNC: 9 MMOL/L (ref 8–16)
AST SERPL-CCNC: 26 U/L (ref 10–40)
BILIRUB SERPL-MCNC: 0.6 MG/DL (ref 0.1–1)
BNP SERPL-MCNC: 68 PG/ML (ref 0–99)
BUN SERPL-MCNC: 15 MG/DL (ref 8–23)
CALCIUM SERPL-MCNC: 9.4 MG/DL (ref 8.7–10.5)
CHLORIDE SERPL-SCNC: 105 MMOL/L (ref 95–110)
CO2 SERPL-SCNC: 24 MMOL/L (ref 23–29)
CREAT SERPL-MCNC: 0.9 MG/DL (ref 0.5–1.4)
EST. GFR  (NO RACE VARIABLE): >60 ML/MIN/1.73 M^2
GLUCOSE SERPL-MCNC: 94 MG/DL (ref 70–110)
MAGNESIUM SERPL-MCNC: 1.9 MG/DL (ref 1.6–2.6)
POTASSIUM SERPL-SCNC: 4.8 MMOL/L (ref 3.5–5.1)
PROT SERPL-MCNC: 7.6 G/DL (ref 6–8.4)
SODIUM SERPL-SCNC: 138 MMOL/L (ref 136–145)
TSH SERPL DL<=0.005 MIU/L-ACNC: 1.99 UIU/ML (ref 0.4–4)

## 2022-12-27 PROCEDURE — 99999 PR PBB SHADOW E&M-EST. PATIENT-LVL IV: CPT | Mod: PBBFAC,,, | Performed by: INTERNAL MEDICINE

## 2022-12-27 PROCEDURE — 36415 COLL VENOUS BLD VENIPUNCTURE: CPT | Performed by: INTERNAL MEDICINE

## 2022-12-27 PROCEDURE — 3288F FALL RISK ASSESSMENT DOCD: CPT | Mod: CPTII,S$GLB,, | Performed by: INTERNAL MEDICINE

## 2022-12-27 PROCEDURE — 99999 PR PBB SHADOW E&M-EST. PATIENT-LVL IV: ICD-10-PCS | Mod: PBBFAC,,, | Performed by: INTERNAL MEDICINE

## 2022-12-27 PROCEDURE — 83880 ASSAY OF NATRIURETIC PEPTIDE: CPT | Performed by: INTERNAL MEDICINE

## 2022-12-27 PROCEDURE — 99204 OFFICE O/P NEW MOD 45 MIN: CPT | Mod: S$GLB,,, | Performed by: INTERNAL MEDICINE

## 2022-12-27 PROCEDURE — 99204 PR OFFICE/OUTPT VISIT, NEW, LEVL IV, 45-59 MIN: ICD-10-PCS | Mod: S$GLB,,, | Performed by: INTERNAL MEDICINE

## 2022-12-27 PROCEDURE — 1101F PT FALLS ASSESS-DOCD LE1/YR: CPT | Mod: CPTII,S$GLB,, | Performed by: INTERNAL MEDICINE

## 2022-12-27 PROCEDURE — 83735 ASSAY OF MAGNESIUM: CPT | Performed by: INTERNAL MEDICINE

## 2022-12-27 PROCEDURE — 3074F PR MOST RECENT SYSTOLIC BLOOD PRESSURE < 130 MM HG: ICD-10-PCS | Mod: CPTII,S$GLB,, | Performed by: INTERNAL MEDICINE

## 2022-12-27 PROCEDURE — 1101F PR PT FALLS ASSESS DOC 0-1 FALLS W/OUT INJ PAST YR: ICD-10-PCS | Mod: CPTII,S$GLB,, | Performed by: INTERNAL MEDICINE

## 2022-12-27 PROCEDURE — 3078F PR MOST RECENT DIASTOLIC BLOOD PRESSURE < 80 MM HG: ICD-10-PCS | Mod: CPTII,S$GLB,, | Performed by: INTERNAL MEDICINE

## 2022-12-27 PROCEDURE — 84443 ASSAY THYROID STIM HORMONE: CPT | Performed by: INTERNAL MEDICINE

## 2022-12-27 PROCEDURE — 3008F PR BODY MASS INDEX (BMI) DOCUMENTED: ICD-10-PCS | Mod: CPTII,S$GLB,, | Performed by: INTERNAL MEDICINE

## 2022-12-27 PROCEDURE — 80053 COMPREHEN METABOLIC PANEL: CPT | Performed by: INTERNAL MEDICINE

## 2022-12-27 PROCEDURE — 3074F SYST BP LT 130 MM HG: CPT | Mod: CPTII,S$GLB,, | Performed by: INTERNAL MEDICINE

## 2022-12-27 PROCEDURE — 3078F DIAST BP <80 MM HG: CPT | Mod: CPTII,S$GLB,, | Performed by: INTERNAL MEDICINE

## 2022-12-27 PROCEDURE — 3288F PR FALLS RISK ASSESSMENT DOCUMENTED: ICD-10-PCS | Mod: CPTII,S$GLB,, | Performed by: INTERNAL MEDICINE

## 2022-12-27 PROCEDURE — 3008F BODY MASS INDEX DOCD: CPT | Mod: CPTII,S$GLB,, | Performed by: INTERNAL MEDICINE

## 2022-12-27 RX ORDER — SPIRONOLACTONE 25 MG/1
1 TABLET ORAL DAILY
COMMUNITY
Start: 2022-07-22

## 2022-12-27 RX ORDER — DIGOXIN 125 MCG
0.12 TABLET ORAL DAILY
COMMUNITY
Start: 2022-10-13 | End: 2023-09-18

## 2022-12-27 RX ORDER — LOSARTAN POTASSIUM 100 MG/1
100 TABLET ORAL DAILY
COMMUNITY
Start: 2022-12-06 | End: 2023-12-07

## 2022-12-27 NOTE — TELEPHONE ENCOUNTER
----- Message from Tram Chambers sent at 12/27/2022  2:07 PM CST -----  Regarding: Appointment  Please call patient with urgent appointment today.    Thanks  Tram

## 2022-12-27 NOTE — TELEPHONE ENCOUNTER
Called patient to arrange consult visit. Patient agrees to apt on 1/5/23 at 2 PM with Dr. Goldstein. Confirmed EKG at 1:30 pm prior.     Patient states she has history of pAF and had two ablations performed [place] [place] [facility] She also reports being treated by Dr. Hyman at [facility]. Will obtain records. Patient denies pacemaker/ICD or ILR.

## 2022-12-27 NOTE — PATIENT INSTRUCTIONS
Start Jiardiance (empaglifazin) tomorrow.     Repeat labs in 1 week at location of your choice.    DO NOT take cardiazem for now.     Internists to try: Rohit (metarie)  or Carol (metarie)  or lurdes (metarie)

## 2022-12-27 NOTE — PROGRESS NOTES
Subjective:   Initial evaluation of heart failure    HPI:  Ms. Currie is a 73 y.o. year old White female who has presents for heart failure therapy followup. Patient with history of afib, LVEF 30-40%, followed in [place], moving here to establish care for CHF. Has had LHC at [facility] that does not show obstructive CAD. Here for second opinion for CHF/afib. Stable from breathing standpoint, would like to get set up at [facility] for most of her care. Patient denies N/V/F/C, lightheadedness, dizziness, PND, orthopnea, LE edema, abdominal pain, abdominal pressure, chest pain, chest pressure, syncope, pre-syncope.     TTE 0 not sure of recent LVEF, outside records with previous 35-40%.     Past Medical History:   Diagnosis Date    Atrial fibrillation     Hypertension      Past Surgical History:   Procedure Laterality Date    CHOLECYSTECTOMY      LAPAROSCOPIC GASTRIC BANDING      port subsequently removed and now no longer functional       Family History   Problem Relation Age of Onset    Cancer Mother 84        colon ca    Heart disease Mother         unknown specifics    Asthma Mother     Cancer Father         lung ca--smoker    Hypertension Father        Review of Systems   Constitutional: Positive for weight gain. Negative for chills, decreased appetite, diaphoresis, fever and malaise/fatigue.   HENT:  Negative for congestion.    Eyes:  Negative for blurred vision and visual disturbance.   Cardiovascular:  Positive for dyspnea on exertion and leg swelling. Negative for chest pain, irregular heartbeat, near-syncope, orthopnea, palpitations, paroxysmal nocturnal dyspnea and syncope.   Respiratory:  Negative for cough, shortness of breath, sleep disturbances due to breathing, snoring and wheezing.    Hematologic/Lymphatic: Negative for bleeding problem. Does not bruise/bleed easily.   Skin:  Negative for poor wound healing and rash.   Musculoskeletal:  Negative for arthritis, joint pain and muscle weakness.  "  Gastrointestinal:  Negative for bloating, abdominal pain, anorexia, constipation, diarrhea, hematemesis, hematochezia, melena, nausea and vomiting.   Genitourinary:  Negative for frequency and urgency.   Neurological:  Negative for difficulty with concentration, excessive daytime sleepiness, dizziness, headaches, light-headedness and weakness.   Psychiatric/Behavioral:  Negative for depression. The patient does not have insomnia and is not nervous/anxious.      Objective:   Blood pressure (!) 110/55, pulse 90, height 5' 6" (1.676 m), weight 104.7 kg (230 lb 13.2 oz), SpO2 98 %.body mass index is 37.26 kg/m².    Physical Exam  Vitals and nursing note reviewed.   Constitutional:       General: She is not in acute distress.     Appearance: She is well-developed. She is not diaphoretic.   HENT:      Head: Normocephalic and atraumatic.   Eyes:      General:         Right eye: No discharge.         Left eye: No discharge.      Conjunctiva/sclera: Conjunctivae normal.   Neck:      Vascular: No JVD.   Cardiovascular:      Rate and Rhythm: Normal rate and regular rhythm.      Heart sounds: No murmur heard.    No friction rub. No gallop.   Pulmonary:      Effort: Pulmonary effort is normal.      Breath sounds: Normal breath sounds. No wheezing or rales.   Chest:      Chest wall: No tenderness.   Abdominal:      General: Bowel sounds are normal. There is no distension.      Palpations: Abdomen is soft. There is no mass.      Tenderness: There is no abdominal tenderness. There is no guarding or rebound.   Musculoskeletal:         General: No tenderness. Normal range of motion.      Cervical back: Normal range of motion and neck supple.   Skin:     General: Skin is warm and dry.      Findings: No erythema or rash.   Neurological:      Mental Status: She is alert and oriented to person, place, and time.   Psychiatric:         Behavior: Behavior normal.         Thought Content: Thought content normal.         Judgment: Judgment " normal.       Labs:      Chemistry        Component Value Date/Time     (L) 01/10/2023 1105    K 4.8 01/10/2023 1105     01/10/2023 1105    CO2 23 01/10/2023 1105    BUN 14 01/10/2023 1105    CREATININE 0.8 01/10/2023 1105    GLU 91 01/10/2023 1105        Component Value Date/Time    CALCIUM 10.0 01/10/2023 1105    ALKPHOS 93 12/27/2022 1134    AST 26 12/27/2022 1134    ALT 32 12/27/2022 1134    BILITOT 0.6 12/27/2022 1134    ESTGFRAFRICA 43.9 (A) 09/05/2021 1252    EGFRNONAA 38.1 (A) 09/05/2021 1252          Magnesium   Date Value Ref Range Status   12/27/2022 1.9 1.6 - 2.6 mg/dL Final     Lab Results   Component Value Date    WBC 11.73 09/05/2021    HGB 14.1 09/05/2021    HCT 43 09/05/2021    MCV 90 09/05/2021     09/05/2021     BNP   Date Value Ref Range Status   12/27/2022 68 0 - 99 pg/mL Final     Comment:     Values of less than 100 pg/ml are consistent with non-CHF populations.     No results found for this or any previous visit.      Labs were reviewed with the patient.    Assessment:      1. Nonischemic cardiomyopathy    2. Atrial fibrillation with controlled ventricular response    3. Paroxysmal atrial fibrillation    4. S/P ablation of atrial fibrillation    5. Chronic combined systolic and diastolic heart failure, NYHA class 3    6. BMI 36.0-36.9,adult        Plan:   Start empagliflozin 10mg po daily.  Repeat labs 1 week.  Do not take cardiazem for now.  Echo in a few weeks to followup on LVEF  Referred to PCP options in the system. Referred to EP For afib.   RTC 3 months.   Patient is now NYHA III ACC stage C  Recommend 2 gram sodium restriction and 1500cc fluid restriction.  Encourage physical activity with graded exercise program.  Requested patient to weigh themselves daily, and to notify us if their weight increases by more than 3 lbs in 1 day or 5 lbs in 1 week.   Naz Avendano MD

## 2023-01-10 ENCOUNTER — LAB VISIT (OUTPATIENT)
Dept: LAB | Facility: HOSPITAL | Age: 73
End: 2023-01-10
Attending: INTERNAL MEDICINE
Payer: MEDICARE

## 2023-01-10 ENCOUNTER — HOSPITAL ENCOUNTER (OUTPATIENT)
Dept: CARDIOLOGY | Facility: CLINIC | Age: 73
Discharge: HOME | End: 2023-01-10
Payer: MEDICARE

## 2023-01-10 DIAGNOSIS — I42.8 NONISCHEMIC CARDIOMYOPATHY: ICD-10-CM

## 2023-01-10 DIAGNOSIS — I48.91 ATRIAL FIBRILLATION WITH CONTROLLED VENTRICULAR RESPONSE: ICD-10-CM

## 2023-01-10 LAB
ANION GAP SERPL CALC-SCNC: 8 MMOL/L (ref 8–16)
BUN SERPL-MCNC: 14 MG/DL (ref 8–23)
CALCIUM SERPL-MCNC: 10 MG/DL (ref 8.7–10.5)
CHLORIDE SERPL-SCNC: 104 MMOL/L (ref 95–110)
CO2 SERPL-SCNC: 23 MMOL/L (ref 23–29)
CREAT SERPL-MCNC: 0.8 MG/DL (ref 0.5–1.4)
EST. GFR  (NO RACE VARIABLE): >60 ML/MIN/1.73 M^2
GLUCOSE SERPL-MCNC: 91 MG/DL (ref 70–110)
POTASSIUM SERPL-SCNC: 4.8 MMOL/L (ref 3.5–5.1)
SODIUM SERPL-SCNC: 135 MMOL/L (ref 136–145)

## 2023-01-10 PROCEDURE — 93005 EKG 12-LEAD: ICD-10-PCS | Mod: S$GLB,,, | Performed by: INTERNAL MEDICINE

## 2023-01-10 PROCEDURE — 93010 EKG 12-LEAD: ICD-10-PCS | Mod: S$GLB,,, | Performed by: INTERNAL MEDICINE

## 2023-01-10 PROCEDURE — 93010 ELECTROCARDIOGRAM REPORT: CPT | Mod: S$GLB,,, | Performed by: INTERNAL MEDICINE

## 2023-01-10 PROCEDURE — 93005 ELECTROCARDIOGRAM TRACING: CPT | Mod: S$GLB,,, | Performed by: INTERNAL MEDICINE

## 2023-01-10 PROCEDURE — 80048 BASIC METABOLIC PNL TOTAL CA: CPT | Performed by: INTERNAL MEDICINE

## 2023-01-10 PROCEDURE — 36415 COLL VENOUS BLD VENIPUNCTURE: CPT | Performed by: INTERNAL MEDICINE

## 2023-01-19 ENCOUNTER — OFFICE VISIT (OUTPATIENT)
Dept: ELECTROPHYSIOLOGY | Facility: CLINIC | Age: 73
End: 2023-01-19
Payer: MEDICARE

## 2023-01-19 ENCOUNTER — HOSPITAL ENCOUNTER (OUTPATIENT)
Dept: CARDIOLOGY | Facility: CLINIC | Age: 73
Discharge: HOME | End: 2023-01-19
Payer: MEDICARE

## 2023-01-19 VITALS
HEIGHT: 66 IN | WEIGHT: 227.5 LBS | BODY MASS INDEX: 36.56 KG/M2 | SYSTOLIC BLOOD PRESSURE: 112 MMHG | HEART RATE: 87 BPM | DIASTOLIC BLOOD PRESSURE: 60 MMHG

## 2023-01-19 DIAGNOSIS — Z86.79 S/P ABLATION OF ATRIAL FIBRILLATION: Primary | ICD-10-CM

## 2023-01-19 DIAGNOSIS — I48.91 ATRIAL FIBRILLATION WITH CONTROLLED VENTRICULAR RESPONSE: ICD-10-CM

## 2023-01-19 DIAGNOSIS — Z86.79 S/P ABLATION OF ATRIAL FLUTTER: ICD-10-CM

## 2023-01-19 DIAGNOSIS — I48.0 PAROXYSMAL ATRIAL FIBRILLATION: Chronic | ICD-10-CM

## 2023-01-19 DIAGNOSIS — Z98.890 S/P ABLATION OF ATRIAL FIBRILLATION: Primary | ICD-10-CM

## 2023-01-19 DIAGNOSIS — I42.8 NONISCHEMIC CARDIOMYOPATHY: ICD-10-CM

## 2023-01-19 DIAGNOSIS — Z98.890 S/P ABLATION OF ATRIAL FLUTTER: ICD-10-CM

## 2023-01-19 PROCEDURE — 1101F PR PT FALLS ASSESS DOC 0-1 FALLS W/OUT INJ PAST YR: ICD-10-PCS | Mod: CPTII,S$GLB,, | Performed by: INTERNAL MEDICINE

## 2023-01-19 PROCEDURE — 3078F PR MOST RECENT DIASTOLIC BLOOD PRESSURE < 80 MM HG: ICD-10-PCS | Mod: CPTII,S$GLB,, | Performed by: INTERNAL MEDICINE

## 2023-01-19 PROCEDURE — 1125F PR PAIN SEVERITY QUANTIFIED, PAIN PRESENT: ICD-10-PCS | Mod: CPTII,S$GLB,, | Performed by: INTERNAL MEDICINE

## 2023-01-19 PROCEDURE — 3288F PR FALLS RISK ASSESSMENT DOCUMENTED: ICD-10-PCS | Mod: CPTII,S$GLB,, | Performed by: INTERNAL MEDICINE

## 2023-01-19 PROCEDURE — 3288F FALL RISK ASSESSMENT DOCD: CPT | Mod: CPTII,S$GLB,, | Performed by: INTERNAL MEDICINE

## 2023-01-19 PROCEDURE — 99999 PR PBB SHADOW E&M-EST. PATIENT-LVL III: CPT | Mod: PBBFAC,,, | Performed by: INTERNAL MEDICINE

## 2023-01-19 PROCEDURE — 1160F RVW MEDS BY RX/DR IN RCRD: CPT | Mod: CPTII,S$GLB,, | Performed by: INTERNAL MEDICINE

## 2023-01-19 PROCEDURE — 99999 PR PBB SHADOW E&M-EST. PATIENT-LVL III: ICD-10-PCS | Mod: PBBFAC,,, | Performed by: INTERNAL MEDICINE

## 2023-01-19 PROCEDURE — 93010 EKG 12-LEAD: ICD-10-PCS | Mod: S$GLB,,, | Performed by: INTERNAL MEDICINE

## 2023-01-19 PROCEDURE — 93005 EKG 12-LEAD: ICD-10-PCS | Mod: S$GLB,,, | Performed by: INTERNAL MEDICINE

## 2023-01-19 PROCEDURE — 3074F SYST BP LT 130 MM HG: CPT | Mod: CPTII,S$GLB,, | Performed by: INTERNAL MEDICINE

## 2023-01-19 PROCEDURE — 99205 OFFICE O/P NEW HI 60 MIN: CPT | Mod: S$GLB,,, | Performed by: INTERNAL MEDICINE

## 2023-01-19 PROCEDURE — 1101F PT FALLS ASSESS-DOCD LE1/YR: CPT | Mod: CPTII,S$GLB,, | Performed by: INTERNAL MEDICINE

## 2023-01-19 PROCEDURE — 93005 ELECTROCARDIOGRAM TRACING: CPT | Mod: S$GLB,,, | Performed by: INTERNAL MEDICINE

## 2023-01-19 PROCEDURE — 3074F PR MOST RECENT SYSTOLIC BLOOD PRESSURE < 130 MM HG: ICD-10-PCS | Mod: CPTII,S$GLB,, | Performed by: INTERNAL MEDICINE

## 2023-01-19 PROCEDURE — 1159F MED LIST DOCD IN RCRD: CPT | Mod: CPTII,S$GLB,, | Performed by: INTERNAL MEDICINE

## 2023-01-19 PROCEDURE — 93010 ELECTROCARDIOGRAM REPORT: CPT | Mod: S$GLB,,, | Performed by: INTERNAL MEDICINE

## 2023-01-19 PROCEDURE — 1160F PR REVIEW ALL MEDS BY PRESCRIBER/CLIN PHARMACIST DOCUMENTED: ICD-10-PCS | Mod: CPTII,S$GLB,, | Performed by: INTERNAL MEDICINE

## 2023-01-19 PROCEDURE — 99205 PR OFFICE/OUTPT VISIT, NEW, LEVL V, 60-74 MIN: ICD-10-PCS | Mod: S$GLB,,, | Performed by: INTERNAL MEDICINE

## 2023-01-19 PROCEDURE — 1159F PR MEDICATION LIST DOCUMENTED IN MEDICAL RECORD: ICD-10-PCS | Mod: CPTII,S$GLB,, | Performed by: INTERNAL MEDICINE

## 2023-01-19 PROCEDURE — 3008F PR BODY MASS INDEX (BMI) DOCUMENTED: ICD-10-PCS | Mod: CPTII,S$GLB,, | Performed by: INTERNAL MEDICINE

## 2023-01-19 PROCEDURE — 3078F DIAST BP <80 MM HG: CPT | Mod: CPTII,S$GLB,, | Performed by: INTERNAL MEDICINE

## 2023-01-19 PROCEDURE — 1125F AMNT PAIN NOTED PAIN PRSNT: CPT | Mod: CPTII,S$GLB,, | Performed by: INTERNAL MEDICINE

## 2023-01-19 PROCEDURE — 3008F BODY MASS INDEX DOCD: CPT | Mod: CPTII,S$GLB,, | Performed by: INTERNAL MEDICINE

## 2023-01-19 RX ORDER — METOPROLOL SUCCINATE 50 MG/1
50 TABLET, EXTENDED RELEASE ORAL
COMMUNITY
Start: 2022-10-06 | End: 2023-09-18

## 2023-01-19 RX ORDER — DILTIAZEM HYDROCHLORIDE 90 MG/1
90 TABLET, FILM COATED ORAL DAILY PRN
COMMUNITY
Start: 2022-10-12 | End: 2023-09-18

## 2023-01-19 RX ORDER — TORSEMIDE 20 MG/1
20 TABLET ORAL
COMMUNITY
Start: 2022-05-09 | End: 2023-03-27

## 2023-01-19 RX ORDER — ERGOCALCIFEROL 1.25 MG/1
CAPSULE ORAL
COMMUNITY

## 2023-01-19 NOTE — PROGRESS NOTES
Subjective:    Patient ID:  Rachelle Currie is a 73 y.o. female who presents for evaluation of Atrial Fibrillation      73 yoF here for arrhythmia management. She has had persistent atrial fibrillation for several years. She had cryoablation about ten years ago and another ablation (AFL?) shortly afterwards while living in [place]. She since moved to [place]. Records from clinic visits show that her ER was 30-40% in AF. She was advised to see EP. She then moved to  to be close to family. She saw cardiologists at [facility] who performed LHC that showed no obstruction. She decided to seek a second opinion for her AF care. She has mild ROCKWELL and fatigue. No syncope or near syncope. She does not know when the last CV was performed. She is on xarelto for CVA prophylaxis.     LHC at [facility] 2022: No obstruction    EF 30-40% Echo 11/2022    Past Medical History:  No date: Atrial fibrillation  No date: Hypertension    Past Surgical History:  No date: CHOLECYSTECTOMY  No date: LAPAROSCOPIC GASTRIC BANDING      Comment:  port subsequently removed and now no longer functional    Social History    Socioeconomic History      Marital status:     Tobacco Use      Smoking status: Never      Smokeless tobacco: Never    Substance and Sexual Activity      Alcohol use: Not Currently      Drug use: No      Sexual activity: Yes        Partners: Male    Review of patient's family history indicates:    Current Outpatient Medications:  digoxin (LANOXIN) 125 mcg tablet, Take 0.125 mg by mouth once daily., Disp: , Rfl:   losartan (COZAAR) 100 MG tablet, Take 100 mg by mouth once daily., Disp: , Rfl:   metoprolol succinate (TOPROL-XL) 25 MG 24 hr tablet, Take 25 mg by mouth once daily., Disp: , Rfl:   rivaroxaban (XARELTO) 20 mg Tab, Take 20 mg by mouth daily with dinner or evening meal., Disp: , Rfl:   rosuvastatin (CRESTOR) 40 MG Tab, Take 10 mg by mouth every evening., Disp: , Rfl:   spironolactone (ALDACTONE) 25 MG tablet, Take 1 tablet  by mouth once daily., Disp: , Rfl:     No current facility-administered medications for this visit.          Review of Systems   Constitutional: Negative.   HENT: Negative.     Eyes: Negative.    Cardiovascular:  Negative for chest pain, dyspnea on exertion, leg swelling, near-syncope, palpitations and syncope.   Respiratory: Negative.  Negative for shortness of breath.    Endocrine: Negative.    Hematologic/Lymphatic: Negative.    Skin: Negative.    Musculoskeletal: Negative.    Gastrointestinal: Negative.    Genitourinary: Negative.    Neurological: Negative.  Negative for dizziness and light-headedness.   Psychiatric/Behavioral: Negative.     Allergic/Immunologic: Negative.       Objective:    Physical Exam  Vitals reviewed.   Constitutional:       General: She is not in acute distress.     Appearance: She is well-developed.   HENT:      Head: Normocephalic and atraumatic.   Eyes:      Pupils: Pupils are equal, round, and reactive to light.   Neck:      Thyroid: No thyromegaly.      Vascular: No JVD.   Cardiovascular:      Rate and Rhythm: Normal rate. Rhythm irregular.      Chest Wall: PMI is not displaced.      Heart sounds: Normal heart sounds, S1 normal and S2 normal. No murmur heard.    No friction rub. No gallop.   Pulmonary:      Effort: Pulmonary effort is normal. No respiratory distress.      Breath sounds: Normal breath sounds. No wheezing or rales.   Abdominal:      General: Bowel sounds are normal. There is no distension.      Palpations: Abdomen is soft.      Tenderness: There is no abdominal tenderness. There is no guarding or rebound.   Musculoskeletal:         General: No tenderness. Normal range of motion.      Cervical back: Normal range of motion.   Skin:     General: Skin is warm and dry.      Findings: No erythema or rash.   Neurological:      Mental Status: She is alert and oriented to person, place, and time.      Cranial Nerves: No cranial nerve deficit.   Psychiatric:         Behavior:  Behavior normal.         Thought Content: Thought content normal.         Judgment: Judgment normal.   ECg: AF with controlled, V rate      Assessment:       1. S/P ablation of atrial fibrillation    2. Nonischemic cardiomyopathy    3. Atrial fibrillation with controlled ventricular response    4. S/P ablation of atrial flutter         Plan:       73 yoF here for arrhythmia management. She has persistent with associated heart failure. I recommended repeat ablation for definitive therapy. I had extensive discussion with patient regarding risks and benefits of PVI/WACA, and the patient would like to proceed. Risks of procedure include (but are not limited to) bleeding, stroke, perforation requiring emergency draining or surgery, AV block, death, AV fistula, AE fistula, PV stenosis.    Last anti-coagulation dose night prior to procedure.  Discontinue antiarrhytmic drugs 4 days prior to the procedure.     RF re-doPVI/CTI, PWI  Anesthesia  ANGELICA prior, cancel if NSR  [place]

## 2023-02-02 ENCOUNTER — TELEPHONE (OUTPATIENT)
Dept: ELECTROPHYSIOLOGY | Facility: CLINIC | Age: 73
End: 2023-02-02
Payer: MEDICARE

## 2023-02-02 NOTE — TELEPHONE ENCOUNTER
----- Message from Mabel Haq MA sent at 2/2/2023 10:06 AM CST -----  Regarding: FW: schedule procedure    ----- Message -----  From: Lisa Bruce  Sent: 2/2/2023   9:18 AM CST  To: Triston [facility] Staff  Subject: schedule procedure                               The pt is calling to schedule her procedure. Please call her back @ [phone]. Thanks, Lisa

## 2023-02-02 NOTE — TELEPHONE ENCOUNTER
I spoke with Ms. Rodriguezer who said that she would like me to call her back tomorrow afternoon. I let her know that I could do that. She appreciated the call.

## 2023-02-06 ENCOUNTER — HOSPITAL ENCOUNTER (OUTPATIENT)
Dept: CARDIOLOGY | Facility: HOSPITAL | Age: 73
Discharge: HOME | End: 2023-02-06
Attending: INTERNAL MEDICINE
Payer: MEDICARE

## 2023-02-06 VITALS
HEIGHT: 66 IN | DIASTOLIC BLOOD PRESSURE: 85 MMHG | BODY MASS INDEX: 36.48 KG/M2 | SYSTOLIC BLOOD PRESSURE: 131 MMHG | WEIGHT: 227 LBS | HEART RATE: 78 BPM

## 2023-02-06 DIAGNOSIS — I48.91 ATRIAL FIBRILLATION WITH CONTROLLED VENTRICULAR RESPONSE: ICD-10-CM

## 2023-02-06 DIAGNOSIS — I42.8 NONISCHEMIC CARDIOMYOPATHY: ICD-10-CM

## 2023-02-06 LAB
ASCENDING AORTA: 3.52 CM
AV INDEX (PROSTH): 0.56
AV MEAN GRADIENT: 2 MMHG
AV PEAK GRADIENT: 4 MMHG
AV VALVE AREA: 2.02 CM2
AV VELOCITY RATIO: 0.56
BSA FOR ECHO PROCEDURE: 2.19 M2
CV ECHO LV RWT: 0.38 CM
DOP CALC AO PEAK VEL: 0.94 M/S
DOP CALC AO VTI: 19.3 CM
DOP CALC LVOT AREA: 3.6 CM2
DOP CALC LVOT DIAMETER: 2.15 CM
DOP CALC LVOT PEAK VEL: 0.53 M/S
DOP CALC LVOT STROKE VOLUME: 39.01 CM3
DOP CALCLVOT PEAK VEL VTI: 10.75 CM
E/E' RATIO: 8 M/S
ECHO LV POSTERIOR WALL: 0.85 CM (ref 0.6–1.1)
EJECTION FRACTION: 50 %
FRACTIONAL SHORTENING: 25 % (ref 28–44)
INTERVENTRICULAR SEPTUM: 0.82 CM (ref 0.6–1.1)
IVRT: 85.63 MSEC
LA MAJOR: 6.51 CM
LA MINOR: 6.58 CM
LA WIDTH: 4.2 CM
LEFT ATRIUM SIZE: 3.85 CM
LEFT ATRIUM VOLUME INDEX MOD: 30.7 ML/M2
LEFT ATRIUM VOLUME INDEX: 42.6 ML/M2
LEFT ATRIUM VOLUME MOD: 64.72 CM3
LEFT ATRIUM VOLUME: 89.96 CM3
LEFT INTERNAL DIMENSION IN SYSTOLE: 3.34 CM (ref 2.1–4)
LEFT VENTRICLE DIASTOLIC VOLUME INDEX: 43.28 ML/M2
LEFT VENTRICLE DIASTOLIC VOLUME: 91.32 ML
LEFT VENTRICLE MASS INDEX: 57 G/M2
LEFT VENTRICLE SYSTOLIC VOLUME INDEX: 21.5 ML/M2
LEFT VENTRICLE SYSTOLIC VOLUME: 45.29 ML
LEFT VENTRICULAR INTERNAL DIMENSION IN DIASTOLE: 4.48 CM (ref 3.5–6)
LEFT VENTRICULAR MASS: 119.32 G
LV LATERAL E/E' RATIO: 6.13 M/S
LV SEPTAL E/E' RATIO: 11.5 M/S
MV PEAK E VEL: 0.92 M/S
PISA TR MAX VEL: 2.4 M/S
RA MAJOR: 7 CM
RA PRESSURE: 3 MMHG
RA WIDTH: 4.26 CM
RIGHT VENTRICULAR END-DIASTOLIC DIMENSION: 3.06 CM
RV TISSUE DOPPLER FREE WALL SYSTOLIC VELOCITY 1 (APICAL 4 CHAMBER VIEW): 9.62 CM/S
SINUS: 3.15 CM
STJ: 3.39 CM
TDI LATERAL: 0.15 M/S
TDI SEPTAL: 0.08 M/S
TDI: 0.12 M/S
TR MAX PG: 23 MMHG
TRICUSPID ANNULAR PLANE SYSTOLIC EXCURSION: 2 CM
TV REST PULMONARY ARTERY PRESSURE: 26 MMHG

## 2023-02-06 PROCEDURE — 93306 ECHO (CUPID ONLY): ICD-10-PCS | Mod: 26,,, | Performed by: INTERNAL MEDICINE

## 2023-02-06 PROCEDURE — 93306 TTE W/DOPPLER COMPLETE: CPT | Mod: 26,,, | Performed by: INTERNAL MEDICINE

## 2023-02-06 PROCEDURE — 93306 TTE W/DOPPLER COMPLETE: CPT

## 2023-02-07 ENCOUNTER — TELEPHONE (OUTPATIENT)
Dept: ELECTROPHYSIOLOGY | Facility: CLINIC | Age: 73
End: 2023-02-07
Payer: MEDICARE

## 2023-02-07 NOTE — TELEPHONE ENCOUNTER
Attempted to contact patient to schedule her procedure. No answer. Left voicemail with callback number.

## 2023-02-07 NOTE — TELEPHONE ENCOUNTER
----- Message from Mabel Haq MA sent at 2/6/2023  3:26 PM CST -----  Regarding: FW: returning a call  Pt calling to schedule her procedure.    Mabel      ----- Message -----  From: Lisa Bruce  Sent: 2/6/2023  11:32 AM CST  To: Triston Thornton Staff  Subject: returning a call                                 The pt is returning a  call. Please call her back @ [phone]. Thanks, Lisa

## 2023-02-09 ENCOUNTER — TELEPHONE (OUTPATIENT)
Dept: ELECTROPHYSIOLOGY | Facility: CLINIC | Age: 73
End: 2023-02-09
Payer: MEDICARE

## 2023-02-09 NOTE — TELEPHONE ENCOUNTER
Attempted to contact patient to schedule her ablation procedure with Dr. Goldstein. No answer. Left voicemail with callback number.

## 2023-02-16 ENCOUNTER — TELEPHONE (OUTPATIENT)
Dept: ELECTROPHYSIOLOGY | Facility: CLINIC | Age: 73
End: 2023-02-16
Payer: MEDICARE

## 2023-02-16 NOTE — TELEPHONE ENCOUNTER
I attempted to contact Ms. Currie, again, to schedule her procedure with Dr. Goldstein. No answer. Left voicemail with callback number.

## 2023-03-17 ENCOUNTER — TELEPHONE (OUTPATIENT)
Dept: NEUROLOGY | Facility: CLINIC | Age: 73
End: 2023-03-17
Payer: MEDICARE

## 2023-03-17 ENCOUNTER — TELEPHONE (OUTPATIENT)
Dept: SLEEP MEDICINE | Facility: CLINIC | Age: 73
End: 2023-03-17
Payer: MEDICARE

## 2023-03-17 NOTE — TELEPHONE ENCOUNTER
----- Message from Michelle Jeff sent at 3/17/2023  8:44 AM CDT -----  Regarding: missed call  Type:  Patient Returning Call    Who Called:Taylor     Who Left Message for Patient: shadia     Does the patient know what this is regarding?:yes     Would the patient rather a call back or a response via [employer]? Call     Best Call Back Number:[phone]    Additional Information:

## 2023-03-17 NOTE — TELEPHONE ENCOUNTER
----- Message from Yuearmida Luis Alberto sent at 3/17/2023  8:05 AM CDT -----  Regarding: Questions About Appointment  Name of Who is Calling:  Patient        [phone]            What is the request in detail:  Patient want to ask questions about her appointment            Can the clinic reply by [facility]:No            What [facility] Back [facility]:[phone]

## 2023-03-20 ENCOUNTER — OFFICE VISIT (OUTPATIENT)
Dept: SLEEP MEDICINE | Facility: CLINIC | Age: 73
End: 2023-03-20
Attending: INTERNAL MEDICINE
Payer: MEDICARE

## 2023-03-20 VITALS
SYSTOLIC BLOOD PRESSURE: 101 MMHG | BODY MASS INDEX: 35.01 KG/M2 | HEIGHT: 66 IN | DIASTOLIC BLOOD PRESSURE: 74 MMHG | HEART RATE: 90 BPM | WEIGHT: 217.81 LBS

## 2023-03-20 DIAGNOSIS — I42.8 NONISCHEMIC CARDIOMYOPATHY: ICD-10-CM

## 2023-03-20 DIAGNOSIS — G47.10 HYPERSOMNOLENCE: ICD-10-CM

## 2023-03-20 DIAGNOSIS — Z78.9 INTOLERANCE OF CONTINUOUS POSITIVE AIRWAY PRESSURE (CPAP) VENTILATION: ICD-10-CM

## 2023-03-20 DIAGNOSIS — G47.33 OSA (OBSTRUCTIVE SLEEP APNEA): Primary | ICD-10-CM

## 2023-03-20 PROCEDURE — 1101F PT FALLS ASSESS-DOCD LE1/YR: CPT | Mod: CPTII,S$GLB,, | Performed by: INTERNAL MEDICINE

## 2023-03-20 PROCEDURE — 99999 PR PBB SHADOW E&M-EST. PATIENT-LVL III: ICD-10-PCS | Mod: PBBFAC,,, | Performed by: INTERNAL MEDICINE

## 2023-03-20 PROCEDURE — 1159F MED LIST DOCD IN RCRD: CPT | Mod: CPTII,S$GLB,, | Performed by: INTERNAL MEDICINE

## 2023-03-20 PROCEDURE — 3008F PR BODY MASS INDEX (BMI) DOCUMENTED: ICD-10-PCS | Mod: CPTII,S$GLB,, | Performed by: INTERNAL MEDICINE

## 2023-03-20 PROCEDURE — 3288F PR FALLS RISK ASSESSMENT DOCUMENTED: ICD-10-PCS | Mod: CPTII,S$GLB,, | Performed by: INTERNAL MEDICINE

## 2023-03-20 PROCEDURE — 3074F SYST BP LT 130 MM HG: CPT | Mod: CPTII,S$GLB,, | Performed by: INTERNAL MEDICINE

## 2023-03-20 PROCEDURE — 3078F DIAST BP <80 MM HG: CPT | Mod: CPTII,S$GLB,, | Performed by: INTERNAL MEDICINE

## 2023-03-20 PROCEDURE — 99204 PR OFFICE/OUTPT VISIT, NEW, LEVL IV, 45-59 MIN: ICD-10-PCS | Mod: S$GLB,,, | Performed by: INTERNAL MEDICINE

## 2023-03-20 PROCEDURE — 1159F PR MEDICATION LIST DOCUMENTED IN MEDICAL RECORD: ICD-10-PCS | Mod: CPTII,S$GLB,, | Performed by: INTERNAL MEDICINE

## 2023-03-20 PROCEDURE — 3074F PR MOST RECENT SYSTOLIC BLOOD PRESSURE < 130 MM HG: ICD-10-PCS | Mod: CPTII,S$GLB,, | Performed by: INTERNAL MEDICINE

## 2023-03-20 PROCEDURE — 3288F FALL RISK ASSESSMENT DOCD: CPT | Mod: CPTII,S$GLB,, | Performed by: INTERNAL MEDICINE

## 2023-03-20 PROCEDURE — 99999 PR PBB SHADOW E&M-EST. PATIENT-LVL III: CPT | Mod: PBBFAC,,, | Performed by: INTERNAL MEDICINE

## 2023-03-20 PROCEDURE — 3008F BODY MASS INDEX DOCD: CPT | Mod: CPTII,S$GLB,, | Performed by: INTERNAL MEDICINE

## 2023-03-20 PROCEDURE — 1101F PR PT FALLS ASSESS DOC 0-1 FALLS W/OUT INJ PAST YR: ICD-10-PCS | Mod: CPTII,S$GLB,, | Performed by: INTERNAL MEDICINE

## 2023-03-20 PROCEDURE — 99204 OFFICE O/P NEW MOD 45 MIN: CPT | Mod: S$GLB,,, | Performed by: INTERNAL MEDICINE

## 2023-03-20 PROCEDURE — 3078F PR MOST RECENT DIASTOLIC BLOOD PRESSURE < 80 MM HG: ICD-10-PCS | Mod: CPTII,S$GLB,, | Performed by: INTERNAL MEDICINE

## 2023-03-20 NOTE — PROGRESS NOTES
"NEW PATIENT VISIT    Rachelle Currie  is a pleasant 73 y.o. female  with PMH significant for Heart failure, depression, HLD who presents for sleep study, on advice of cardiology.  Patient reports that she has had sleep study done in the past and reports having a machine in the past and states that "the airflow was like being in a wind tunnel" and reports poor mask toleration.  Patient reports that  has a much smaller machine than the one she had, and states that he is having more energy.  Reports having a diagnosis of sleep apnea.  Reports feeling anergic during the day.     Prior sleep studies:   Yes- [facility] 2-3 years ago    Trouble falling asleep?: None  Trouble staying asleep?: Yes  Hypnotic use?:  None    Bed partner:    Yes  Witnessed snoring ?:   Yes  Snoring arousals?:  Yes  Witnessed apneas?  None    Sleepy if inactive?:  Yes  ESS:        [place] Sleepiness Scale:  Sitting and reading:   3  Watching TV:    3  Passenger in a car x 1 hr:  3  Sitting quietly after lunch:  3  Lying down to rest in PM:  3  Sitting, inactive in public:  3  Sitting+ talking to someone:  0  Stopped in traffic:   0  Total    18/24      PAP history   Problems    Mask FFM   Pressure Felt like a wind tunnel   DME N/a   Machine age 2018, returned after about 2 months   Download N/a       SLEEP SCHEDULE   Bed Time 21:00   Sleep Latency 15 minutes   Arousals Yes   Back to sleep 5-10 minutes   Wake time 04:00   Naps Yes   Nocturia 3-4x   Work Housewife       Vitals:    03/20/23 0952   BP: 101/74   BP Location: Right arm   Patient Position: Sitting   BP Method: Medium (Automatic)   Pulse: 90   Weight: 98.8 kg (217 lb 13 oz)   Height: 5' 6" (1.676 m)       Physical Exam:    GEN:   Well-appearing  Psych:  Appropriate affect, demonstrates insight  SKIN:  No rash on the face or bridge of the nose      LABS:   Lab Results   Component Value Date    CO2 23 01/10/2023       RECORDS REVIEWED PREVIOUSLY:    Prior sleep studies are " "unavailable.    Echo 2/6/23: EF 50%    ASSESSMENT    No flowsheet data found.    Presenting Complaint:  BARRIE, CPAP intolerance    PROBLEM DESCRIPTION/ Sx on Presentation  STATUS   BARRIE   Dx circa 2019  New   Daytime Sx   + sleepiness when inactive   ESS 18/24 on intake  New   CPAP intolerance Felt like she was breathing in a "wind tunnel"         Insomnia   Trouble falling asleep:   Maintenance:         waking frequently  Prior hypnotics:        Current hypnotics:     New   Nocturia   x 4 per sleep period  New   Other issues: HTN, pAF, CHF, BMI 36,     PLAN     -recommend sleep testing to requalify (Split in case needs to requalify to for possible inspire if needed)  -will order another CPAP for retrial  -discussed Inspire which we will consider if not making progress  -discussed trial therapy if BARRIE present and the patient is  open to a trial of CPAP therapy    RTC          The patient was given open opportunity to ask questions and/or express concerns about treatment plan.   All questions/concerns were discussed.     Two patient identifiers used prior to evaluation.       "

## 2023-03-27 ENCOUNTER — OFFICE VISIT (OUTPATIENT)
Dept: TRANSPLANT | Facility: CLINIC | Age: 73
End: 2023-03-27
Payer: MEDICARE

## 2023-03-27 ENCOUNTER — TELEPHONE (OUTPATIENT)
Dept: PRIMARY CARE CLINIC | Facility: CLINIC | Age: 73
End: 2023-03-27
Payer: MEDICARE

## 2023-03-27 ENCOUNTER — TELEPHONE (OUTPATIENT)
Dept: SLEEP MEDICINE | Facility: CLINIC | Age: 73
End: 2023-03-27
Payer: MEDICARE

## 2023-03-27 VITALS
BODY MASS INDEX: 35.4 KG/M2 | SYSTOLIC BLOOD PRESSURE: 129 MMHG | HEIGHT: 66 IN | WEIGHT: 220.25 LBS | DIASTOLIC BLOOD PRESSURE: 79 MMHG | HEART RATE: 69 BPM

## 2023-03-27 DIAGNOSIS — I42.8 NONISCHEMIC CARDIOMYOPATHY: ICD-10-CM

## 2023-03-27 DIAGNOSIS — I48.91 ATRIAL FIBRILLATION WITH CONTROLLED VENTRICULAR RESPONSE: Primary | ICD-10-CM

## 2023-03-27 PROCEDURE — 1126F PR PAIN SEVERITY QUANTIFIED, NO PAIN PRESENT: ICD-10-PCS | Mod: CPTII,S$GLB,, | Performed by: INTERNAL MEDICINE

## 2023-03-27 PROCEDURE — 99214 OFFICE O/P EST MOD 30 MIN: CPT | Mod: S$GLB,,, | Performed by: INTERNAL MEDICINE

## 2023-03-27 PROCEDURE — 1126F AMNT PAIN NOTED NONE PRSNT: CPT | Mod: CPTII,S$GLB,, | Performed by: INTERNAL MEDICINE

## 2023-03-27 PROCEDURE — 3008F PR BODY MASS INDEX (BMI) DOCUMENTED: ICD-10-PCS | Mod: CPTII,S$GLB,, | Performed by: INTERNAL MEDICINE

## 2023-03-27 PROCEDURE — 99999 PR PBB SHADOW E&M-EST. PATIENT-LVL V: CPT | Mod: PBBFAC,,, | Performed by: INTERNAL MEDICINE

## 2023-03-27 PROCEDURE — 1159F MED LIST DOCD IN RCRD: CPT | Mod: CPTII,S$GLB,, | Performed by: INTERNAL MEDICINE

## 2023-03-27 PROCEDURE — 3008F BODY MASS INDEX DOCD: CPT | Mod: CPTII,S$GLB,, | Performed by: INTERNAL MEDICINE

## 2023-03-27 PROCEDURE — 4010F ACE/ARB THERAPY RXD/TAKEN: CPT | Mod: CPTII,S$GLB,, | Performed by: INTERNAL MEDICINE

## 2023-03-27 PROCEDURE — 99999 PR PBB SHADOW E&M-EST. PATIENT-LVL V: ICD-10-PCS | Mod: PBBFAC,,, | Performed by: INTERNAL MEDICINE

## 2023-03-27 PROCEDURE — 3078F PR MOST RECENT DIASTOLIC BLOOD PRESSURE < 80 MM HG: ICD-10-PCS | Mod: CPTII,S$GLB,, | Performed by: INTERNAL MEDICINE

## 2023-03-27 PROCEDURE — 3078F DIAST BP <80 MM HG: CPT | Mod: CPTII,S$GLB,, | Performed by: INTERNAL MEDICINE

## 2023-03-27 PROCEDURE — 4010F PR ACE/ARB THEARPY RXD/TAKEN: ICD-10-PCS | Mod: CPTII,S$GLB,, | Performed by: INTERNAL MEDICINE

## 2023-03-27 PROCEDURE — 3074F PR MOST RECENT SYSTOLIC BLOOD PRESSURE < 130 MM HG: ICD-10-PCS | Mod: CPTII,S$GLB,, | Performed by: INTERNAL MEDICINE

## 2023-03-27 PROCEDURE — 99214 PR OFFICE/OUTPT VISIT, EST, LEVL IV, 30-39 MIN: ICD-10-PCS | Mod: S$GLB,,, | Performed by: INTERNAL MEDICINE

## 2023-03-27 PROCEDURE — 3074F SYST BP LT 130 MM HG: CPT | Mod: CPTII,S$GLB,, | Performed by: INTERNAL MEDICINE

## 2023-03-27 PROCEDURE — 1159F PR MEDICATION LIST DOCUMENTED IN MEDICAL RECORD: ICD-10-PCS | Mod: CPTII,S$GLB,, | Performed by: INTERNAL MEDICINE

## 2023-03-27 NOTE — TELEPHONE ENCOUNTER
----- Message from Olvin Damon sent at 3/27/2023  1:43 PM CDT -----  Name of Who is Calling: SARY SINGER [[id number]]            What is the request in detail: Patient is requesting call back to get update on appointment that needs to be scheduled               Can the clinic reply by [facility]: yes              What Number to Call Back if not in [facility]: [phone]

## 2023-03-27 NOTE — TELEPHONE ENCOUNTER
----- Message from Nahomy Gerardo sent at 3/27/2023 11:29 AM CDT -----  Contact: [phone]  Please call pt to have labs put and scheduled for the week before her annual on 05/15. Pt would like a morning appt at the [facility] location. Please call.              Thank you

## 2023-03-27 NOTE — TELEPHONE ENCOUNTER
Staff left a message for the pt to inform her that the sleep lab will contact her to schedule her sleep study.

## 2023-03-28 ENCOUNTER — TELEPHONE (OUTPATIENT)
Dept: ELECTROPHYSIOLOGY | Facility: CLINIC | Age: 73
End: 2023-03-28
Payer: MEDICARE

## 2023-03-28 NOTE — TELEPHONE ENCOUNTER
Spoke with Ms. Currie and scheduled her procedure for 5/29/23. Procedure details reviewed and instructions will be sent via portal/mail as requested.

## 2023-03-28 NOTE — TELEPHONE ENCOUNTER
----- Message from Jil Jeff sent at 3/27/2023  1:52 PM CDT -----  Regarding: procedure  Pls call pt to schedule her procedure [phone].  She says she can be reached after 5:00pm.  She says she has been trying to reach you for sometime and y'all are playing phone tag.    Thank you

## 2023-03-31 ENCOUNTER — TELEPHONE (OUTPATIENT)
Dept: SLEEP MEDICINE | Facility: OTHER | Age: 73
End: 2023-03-31
Payer: MEDICARE

## 2023-03-31 ENCOUNTER — TELEPHONE (OUTPATIENT)
Dept: INTERNAL MEDICINE | Facility: CLINIC | Age: 73
End: 2023-03-31
Payer: MEDICARE

## 2023-03-31 NOTE — TELEPHONE ENCOUNTER
"----- Message from Deyanira Oliveira sent at 3/31/2023 11:03 AM CDT -----  Regarding: new pt appt  Name of Who is Calling:SARY SINGER [[id number]]          What is the request in detail:Pt is a new pt looking to establish care. She is "Selam Giles" mom and said that Dr Mohan would take her as a new pt. Please c/b to schedule          Can the clinic reply by [facility]:          [facility]:[phone]    "

## 2023-04-03 ENCOUNTER — TELEPHONE (OUTPATIENT)
Dept: SLEEP MEDICINE | Facility: OTHER | Age: 73
End: 2023-04-03
Payer: MEDICARE

## 2023-04-06 PROBLEM — I50.32 CHRONIC DIASTOLIC HEART FAILURE: Status: ACTIVE | Noted: 2022-08-29

## 2023-04-07 NOTE — PROGRESS NOTES
Subjective:   Initial evaluation of heart failure    HPI:  Ms. Currie is a 73 y.o. year old White female who has presents for heart failure therapy followup. Patient with history of afib, LVEF 30-40% from OSH however most recent TTE LVEF 50%, previously followed in [place], moving here to establish care for CHF. Has had LHC at [facility] that does not show obstructive CAD. She has been working on establishing care at [facility], feels good for the most part, is having some difficulty with sleep, fatigue. Tried to get sleep apnea evaluation, but did not get call back from them. Additionally is interested in getting set up in weight loss clinic.     TTE 02/06/23:   Atrial fibrillation observed.  The left ventricle is normal in size with low normal to mildly decreased systolic function.  The estimated ejection fraction is 50% or slightly better.  There is mild left ventricular global hypokinesis.  Moderate left atrial enlargement.  Normal right ventricular size with low normal right ventricular systolic function.  Severe right atrial enlargement.  Mild mitral regurgitation.  Mild tricuspid regurgitation.  Normal central venous pressure (3 mmHg).  The estimated PA systolic pressure is 26 mmHg.  Trivial posterior pericardial effusion. Just base.  Past Medical History:   Diagnosis Date    Atrial fibrillation     Hypertension      Past Surgical History:   Procedure Laterality Date    CHOLECYSTECTOMY      LAPAROSCOPIC GASTRIC BANDING      port subsequently removed and now no longer functional       Family History   Problem Relation Age of Onset    Cancer Mother 84        colon ca    Heart disease Mother         unknown specifics    Asthma Mother     Cancer Father         lung ca--smoker    Hypertension Father        Review of Systems   Constitutional: Positive for weight gain. Negative for chills, decreased appetite, diaphoresis, fever and malaise/fatigue.   HENT:  Negative for congestion.    Eyes:  Negative for blurred vision and  "visual disturbance.   Cardiovascular:  Positive for dyspnea on exertion and leg swelling. Negative for chest pain, irregular heartbeat, near-syncope, orthopnea, palpitations, paroxysmal nocturnal dyspnea and syncope.   Respiratory:  Negative for cough, shortness of breath, sleep disturbances due to breathing, snoring and wheezing.    Hematologic/Lymphatic: Negative for bleeding problem. Does not bruise/bleed easily.   Skin:  Negative for poor wound healing and rash.   Musculoskeletal:  Negative for arthritis, joint pain and muscle weakness.   Gastrointestinal:  Negative for bloating, abdominal pain, anorexia, constipation, diarrhea, hematemesis, hematochezia, melena, nausea and vomiting.   Genitourinary:  Negative for frequency and urgency.   Neurological:  Negative for difficulty with concentration, excessive daytime sleepiness, dizziness, headaches, light-headedness and weakness.   Psychiatric/Behavioral:  Negative for depression. The patient does not have insomnia and is not nervous/anxious.      Objective:   Blood pressure 129/79, pulse 69, height 5' 6" (1.676 m), weight 99.9 kg (220 lb 3.8 oz).body mass index is 35.55 kg/m².    Physical Exam  Vitals and nursing note reviewed.   Constitutional:       General: She is not in acute distress.     Appearance: She is well-developed. She is not diaphoretic.   HENT:      Head: Normocephalic and atraumatic.   Eyes:      General:         Right eye: No discharge.         Left eye: No discharge.      Conjunctiva/sclera: Conjunctivae normal.   Neck:      Vascular: No JVD.   Cardiovascular:      Rate and Rhythm: Normal rate and regular rhythm.      Heart sounds: No murmur heard.    No friction rub. No gallop.   Pulmonary:      Effort: Pulmonary effort is normal.      Breath sounds: Normal breath sounds. No wheezing or rales.   Chest:      Chest wall: No tenderness.   Abdominal:      General: Bowel sounds are normal. There is no distension.      Palpations: Abdomen is soft. " There is no mass.      Tenderness: There is no abdominal tenderness. There is no guarding or rebound.   Musculoskeletal:         General: No tenderness. Normal range of motion.      Cervical back: Normal range of motion and neck supple.   Skin:     General: Skin is warm and dry.      Findings: No erythema or rash.   Neurological:      Mental Status: She is alert and oriented to person, place, and time.   Psychiatric:         Behavior: Behavior normal.         Thought Content: Thought content normal.         Judgment: Judgment normal.       Labs:      Chemistry        Component Value Date/Time     (L) 01/10/2023 1105    K 4.8 01/10/2023 1105     01/10/2023 1105    CO2 23 01/10/2023 1105    BUN 14 01/10/2023 1105    CREATININE 0.8 01/10/2023 1105    GLU 91 01/10/2023 1105        Component Value Date/Time    CALCIUM 10.0 01/10/2023 1105    ALKPHOS 93 12/27/2022 1134    AST 26 12/27/2022 1134    ALT 32 12/27/2022 1134    BILITOT 0.6 12/27/2022 1134    ESTGFRAFRICA 43.9 (A) 09/05/2021 1252    EGFRNONAA 38.1 (A) 09/05/2021 1252          Magnesium   Date Value Ref Range Status   12/27/2022 1.9 1.6 - 2.6 mg/dL Final     Lab Results   Component Value Date    WBC 11.73 09/05/2021    HGB 14.1 09/05/2021    HCT 43 09/05/2021    MCV 90 09/05/2021     09/05/2021     BNP   Date Value Ref Range Status   12/27/2022 68 0 - 99 pg/mL Final     Comment:     Values of less than 100 pg/ml are consistent with non-CHF populations.     No results found for this or any previous visit.      Labs were reviewed with the patient.    Assessment:      1. Atrial fibrillation with controlled ventricular response    2. BMI 36.0-36.9,adult    3. Nonischemic cardiomyopathy        Plan:   Patient continuing to get set up for followup in the system.   Will refer to general cardiology since TTE with improved LVEF even further, low normal. Believe could be a good fit for [facility] expert, Dr. Eng. Will see if she can  follow with her.  Repeat referral to sleep clinic.   Euvolemic on exam, no change to regimen for now, did well with SGLT2i added last visit.   Patient is now NYHA III ACC stage C  Recommend 2 gram sodium restriction and 1500cc fluid restriction.  Encourage physical activity with graded exercise program.  Requested patient to weigh themselves daily, and to notify us if their weight increases by more than 3 lbs in 1 day or 5 lbs in 1 week.   Naz Avendano MD

## 2023-04-13 ENCOUNTER — PATIENT MESSAGE (OUTPATIENT)
Dept: ELECTROPHYSIOLOGY | Facility: CLINIC | Age: 73
End: 2023-04-13
Payer: MEDICARE

## 2023-04-13 DIAGNOSIS — I48.0 PAROXYSMAL ATRIAL FIBRILLATION: Primary | ICD-10-CM

## 2023-04-27 ENCOUNTER — PATIENT MESSAGE (OUTPATIENT)
Dept: ADMINISTRATIVE | Facility: HOSPITAL | Age: 73
End: 2023-04-27
Payer: MEDICARE

## 2023-04-27 ENCOUNTER — PATIENT OUTREACH (OUTPATIENT)
Dept: ADMINISTRATIVE | Facility: HOSPITAL | Age: 73
End: 2023-04-27
Payer: MEDICARE

## 2023-05-11 ENCOUNTER — OFFICE VISIT (OUTPATIENT)
Dept: INTERNAL MEDICINE | Facility: CLINIC | Age: 73
End: 2023-05-11
Attending: INTERNAL MEDICINE
Payer: MEDICARE

## 2023-05-11 VITALS
HEIGHT: 66 IN | BODY MASS INDEX: 34.93 KG/M2 | OXYGEN SATURATION: 97 % | HEART RATE: 98 BPM | DIASTOLIC BLOOD PRESSURE: 88 MMHG | WEIGHT: 217.38 LBS | SYSTOLIC BLOOD PRESSURE: 116 MMHG

## 2023-05-11 DIAGNOSIS — Z12.31 ENCOUNTER FOR SCREENING MAMMOGRAM FOR MALIGNANT NEOPLASM OF BREAST: ICD-10-CM

## 2023-05-11 DIAGNOSIS — I42.8 NONISCHEMIC CARDIOMYOPATHY: ICD-10-CM

## 2023-05-11 DIAGNOSIS — M85.80 OSTEOPENIA, UNSPECIFIED LOCATION: ICD-10-CM

## 2023-05-11 DIAGNOSIS — E78.5 HYPERLIPIDEMIA, UNSPECIFIED HYPERLIPIDEMIA TYPE: ICD-10-CM

## 2023-05-11 DIAGNOSIS — Z12.11 SCREENING FOR COLON CANCER: ICD-10-CM

## 2023-05-11 DIAGNOSIS — I48.91 ATRIAL FIBRILLATION, UNSPECIFIED TYPE: ICD-10-CM

## 2023-05-11 DIAGNOSIS — I10 ESSENTIAL HYPERTENSION, BENIGN: Chronic | ICD-10-CM

## 2023-05-11 DIAGNOSIS — R73.01 IFG (IMPAIRED FASTING GLUCOSE): ICD-10-CM

## 2023-05-11 DIAGNOSIS — E55.9 VITAMIN D DEFICIENCY: ICD-10-CM

## 2023-05-11 DIAGNOSIS — Z76.89 ESTABLISHING CARE WITH NEW DOCTOR, ENCOUNTER FOR: Primary | ICD-10-CM

## 2023-05-11 DIAGNOSIS — R05.1 ACUTE COUGH: ICD-10-CM

## 2023-05-11 LAB
CTP QC/QA: YES
POC MOLECULAR INFLUENZA A AGN: NEGATIVE
POC MOLECULAR INFLUENZA B AGN: NEGATIVE

## 2023-05-11 PROCEDURE — 1159F PR MEDICATION LIST DOCUMENTED IN MEDICAL RECORD: ICD-10-PCS | Mod: CPTII,ICN,, | Performed by: INTERNAL MEDICINE

## 2023-05-11 PROCEDURE — 3079F PR MOST RECENT DIASTOLIC BLOOD PRESSURE 80-89 MM HG: ICD-10-PCS | Mod: CPTII,ICN,, | Performed by: INTERNAL MEDICINE

## 2023-05-11 PROCEDURE — 4010F PR ACE/ARB THEARPY RXD/TAKEN: ICD-10-PCS | Mod: CPTII,ICN,, | Performed by: INTERNAL MEDICINE

## 2023-05-11 PROCEDURE — 3074F SYST BP LT 130 MM HG: CPT | Mod: CPTII,ICN,, | Performed by: INTERNAL MEDICINE

## 2023-05-11 PROCEDURE — 87635 SARS-COV-2 COVID-19 AMP PRB: CPT | Performed by: INTERNAL MEDICINE

## 2023-05-11 PROCEDURE — 1101F PT FALLS ASSESS-DOCD LE1/YR: CPT | Mod: CPTII,ICN,, | Performed by: INTERNAL MEDICINE

## 2023-05-11 PROCEDURE — 3008F PR BODY MASS INDEX (BMI) DOCUMENTED: ICD-10-PCS | Mod: CPTII,ICN,, | Performed by: INTERNAL MEDICINE

## 2023-05-11 PROCEDURE — 3288F FALL RISK ASSESSMENT DOCD: CPT | Mod: CPTII,ICN,, | Performed by: INTERNAL MEDICINE

## 2023-05-11 PROCEDURE — 3008F BODY MASS INDEX DOCD: CPT | Mod: CPTII,ICN,, | Performed by: INTERNAL MEDICINE

## 2023-05-11 PROCEDURE — 1160F RVW MEDS BY RX/DR IN RCRD: CPT | Mod: CPTII,ICN,, | Performed by: INTERNAL MEDICINE

## 2023-05-11 PROCEDURE — 87502 INFLUENZA DNA AMP PROBE: CPT | Mod: QW,S$GLB,ICN, | Performed by: INTERNAL MEDICINE

## 2023-05-11 PROCEDURE — 3079F DIAST BP 80-89 MM HG: CPT | Mod: CPTII,ICN,, | Performed by: INTERNAL MEDICINE

## 2023-05-11 PROCEDURE — 1159F MED LIST DOCD IN RCRD: CPT | Mod: CPTII,ICN,, | Performed by: INTERNAL MEDICINE

## 2023-05-11 PROCEDURE — 99215 OFFICE O/P EST HI 40 MIN: CPT | Mod: S$GLB,ICN,, | Performed by: INTERNAL MEDICINE

## 2023-05-11 PROCEDURE — 99214 OFFICE O/P EST MOD 30 MIN: CPT | Performed by: INTERNAL MEDICINE

## 2023-05-11 PROCEDURE — 87502 POCT INFLUENZA A/B MOLECULAR: ICD-10-PCS | Mod: QW,S$GLB,ICN, | Performed by: INTERNAL MEDICINE

## 2023-05-11 PROCEDURE — 1101F PR PT FALLS ASSESS DOC 0-1 FALLS W/OUT INJ PAST YR: ICD-10-PCS | Mod: CPTII,ICN,, | Performed by: INTERNAL MEDICINE

## 2023-05-11 PROCEDURE — 3288F PR FALLS RISK ASSESSMENT DOCUMENTED: ICD-10-PCS | Mod: CPTII,ICN,, | Performed by: INTERNAL MEDICINE

## 2023-05-11 PROCEDURE — 99215 PR OFFICE/OUTPT VISIT, EST, LEVL V, 40-54 MIN: ICD-10-PCS | Mod: S$GLB,ICN,, | Performed by: INTERNAL MEDICINE

## 2023-05-11 PROCEDURE — 4010F ACE/ARB THERAPY RXD/TAKEN: CPT | Mod: CPTII,ICN,, | Performed by: INTERNAL MEDICINE

## 2023-05-11 PROCEDURE — 3074F PR MOST RECENT SYSTOLIC BLOOD PRESSURE < 130 MM HG: ICD-10-PCS | Mod: CPTII,ICN,, | Performed by: INTERNAL MEDICINE

## 2023-05-11 PROCEDURE — 1160F PR REVIEW ALL MEDS BY PRESCRIBER/CLIN PHARMACIST DOCUMENTED: ICD-10-PCS | Mod: CPTII,ICN,, | Performed by: INTERNAL MEDICINE

## 2023-05-11 RX ORDER — PROMETHAZINE HYDROCHLORIDE AND DEXTROMETHORPHAN HYDROBROMIDE 6.25; 15 MG/5ML; MG/5ML
5 SYRUP ORAL EVERY 8 HOURS PRN
Qty: 118 ML | Refills: 0 | Status: SHIPPED | OUTPATIENT
Start: 2023-05-11 | End: 2023-05-21

## 2023-05-11 NOTE — PROGRESS NOTES
"Subjective:   Patient ID: Rachelle Currie is a 73 y.o. female  Chief complaint:   Chief Complaint   Patient presents with    [facility]    URI       HPI  Pt here to est est care   Alberto velarde mother in law     73F with pAfib, HLD, chornic diast HF, osteopeina, obesity, vit d def, MDD:    Have 6 grandaughters    Cold started a couple of days ago   Eye drainage   Had sick contact - visitor was not feeling well   Cough, sneezing, raspy voice  No measured fevers  Covid test was negative - last infection was > 1 year  Taking cough drops   Take coricedn HPB     Followed by cards in [place], [place]   - est care with Dr. Goldstein   - canceled ablation for later date - to be completed ithsi9 year     OA of knee   To have TKA in future     Vit d def:  - not c/w vit d currently     BARRIE - due for follow up to complete sleep study   - to be rescheduled     Osteopenia:   Dexa utd 3/2022  Not c/w vit d suppl     Afib s/p ablation, cardiomyopathy - systolic and diastolic HF:   Followed by cards   Echo 2/2023    HLD:  - C/w statin     HTN:  - c/w meds per med card   - controlled       HM:   Cscope  Shingrix  Covid   mmg    Cards: Ida meeks  Urology: osman    Review of Systems    Objective:  Vitals:    05/11/23 1014   BP: 116/88   BP Location: Left arm   Patient Position: Sitting   Pulse: 98   SpO2: 97%   Weight: 98.6 kg (217 lb 6 oz)   Height: 5' 6" (1.676 m)     Body mass index is 35.09 kg/m².    Physical Exam  Vitals reviewed.   Constitutional:       Appearance: Normal appearance. She is well-developed.   HENT:      Head: Normocephalic and atraumatic.      Right Ear: Tympanic membrane, ear canal and external ear normal.      Left Ear: Tympanic membrane, ear canal and external ear normal.      Nose: Congestion present.      Mouth/Throat:      Mouth: Mucous membranes are moist.      Pharynx: Oropharynx is clear. Posterior oropharyngeal erythema present. No oropharyngeal exudate.   Eyes:      Extraocular " Movements: Extraocular movements intact.      Conjunctiva/sclera: Conjunctivae normal.   Neck:      Thyroid: No thyromegaly.   Cardiovascular:      Rate and Rhythm: Normal rate and regular rhythm.      Pulses: Normal pulses.      Heart sounds: Normal heart sounds.   Pulmonary:      Effort: Pulmonary effort is normal.      Breath sounds: Normal breath sounds.   Abdominal:      General: Bowel sounds are normal.      Palpations: Abdomen is soft.   Musculoskeletal:         General: No swelling or tenderness.      Cervical back: Neck supple.   Lymphadenopathy:      Cervical: No cervical adenopathy.   Skin:     General: Skin is warm and dry.      Capillary Refill: Capillary refill takes less than 2 seconds.   Neurological:      General: No focal deficit present.      Mental Status: She is alert and oriented to person, place, and time. Mental status is at baseline.   Psychiatric:         Behavior: Behavior normal.         Thought Content: Thought content normal.       Assessment:  1. Establishing care with new doctor, encounter for    2. Acute cough    3. Encounter for screening mammogram for malignant neoplasm of breast    4. Screening for colon cancer    5. Vitamin D deficiency    6. Hyperlipidemia, unspecified hyperlipidemia type    7. Essential hypertension, benign    8. Nonischemic cardiomyopathy    9. Osteopenia, unspecified location    10. IFG (impaired fasting glucose)    11. Atrial fibrillation, unspecified type        Plan:  1. Establishing care with new doctor, encounter for    2. Acute cough  -     POCT Influenza A/B Molecular; Future; Expected date: 05/11/2023  -     Cancel: COVID-19 Routine Screening  -     promethazine-dextromethorphan (PROMETHAZINE-DM) 6.25-15 mg/5 mL Syrp; Take 5 mLs by mouth every 8 (eight) hours as needed (cough). (Patient not taking: Reported on 5/19/2023)  Dispense: 118 mL; Refill: 0  Suspect viral   Supportive care rec   Rx given for prn use     3. Encounter for screening mammogram  for malignant neoplasm of breast  -     Mammo Digital Screening Bilat w/ Soy; Future; Expected date: 05/11/2023    4. Screening for colon cancer  -     Ambulatory referral/consult to Endo Procedure ; Future; Expected date: 05/12/2023    5. Vitamin D deficiency  -     Vitamin D; Future; Expected date: 05/11/2023  Stable   Rec vit d 2k daily     6. Hyperlipidemia, unspecified hyperlipidemia type  -     TSH; Future; Expected date: 05/11/2023  -     Lipid Panel; Future; Expected date: 05/11/2023  -     Comprehensive Metabolic Panel; Future; Expected date: 05/11/2023  Stable   Cont statin     7. Essential hypertension, benign  -     Hemoglobin A1C; Future; Expected date: 05/11/2023  -     CBC Auto Differential; Future; Expected date: 05/11/2023  -     Comprehensive Metabolic Panel; Future; Expected date: 05/11/2023  Stable   Cont meds per med card     8. Nonischemic cardiomyopathy  -     Comprehensive Metabolic Panel; Future; Expected date: 05/11/2023  Stable   Cont jardiance, BB, arb, aldactone     9. Osteopenia, unspecified location  Dexa utd - due 2024  Recommend over the counter supplement of calcium 1200mg divided into 2 doses daily. Recommend 2000u of vitamin D3 daily along with regular exercise.    10. IFG (impaired fasting glucose)  -     Hemoglobin A1C; Future; Expected date: 05/11/2023    11. Atrial fibrillation, unspecified type  Stable   Followed by cards   C/w dig,  xarelto    45 min spent in care of patient including chart review, history, orders, physical, orders and coordination of care      Health Maintenance   Topic Date Due    Mammogram  03/10/2023    DEXA Scan  03/10/2025    Lipid Panel  05/19/2028    TETANUS VACCINE  03/14/2032    Hepatitis C Screening  Completed

## 2023-05-12 LAB — SARS-COV-2 RNA RESP QL NAA+PROBE: NOT DETECTED

## 2023-05-19 ENCOUNTER — LAB VISIT (OUTPATIENT)
Dept: LAB | Facility: HOSPITAL | Age: 73
End: 2023-05-19
Attending: INTERNAL MEDICINE
Payer: MEDICARE

## 2023-05-19 ENCOUNTER — OFFICE VISIT (OUTPATIENT)
Dept: CARDIOLOGY | Facility: CLINIC | Age: 73
End: 2023-05-19
Payer: MEDICARE

## 2023-05-19 ENCOUNTER — PATIENT MESSAGE (OUTPATIENT)
Dept: CARDIOLOGY | Facility: CLINIC | Age: 73
End: 2023-05-19

## 2023-05-19 VITALS
SYSTOLIC BLOOD PRESSURE: 100 MMHG | WEIGHT: 214.06 LBS | HEART RATE: 97 BPM | OXYGEN SATURATION: 98 % | BODY MASS INDEX: 34.4 KG/M2 | HEIGHT: 66 IN | DIASTOLIC BLOOD PRESSURE: 70 MMHG

## 2023-05-19 DIAGNOSIS — I42.8 NONISCHEMIC CARDIOMYOPATHY: ICD-10-CM

## 2023-05-19 DIAGNOSIS — Z98.890 S/P ABLATION OF ATRIAL FIBRILLATION: ICD-10-CM

## 2023-05-19 DIAGNOSIS — I48.0 PAROXYSMAL ATRIAL FIBRILLATION: Primary | ICD-10-CM

## 2023-05-19 DIAGNOSIS — I48.91 ATRIAL FIBRILLATION WITH CONTROLLED VENTRICULAR RESPONSE: ICD-10-CM

## 2023-05-19 DIAGNOSIS — I50.32 CHRONIC DIASTOLIC HEART FAILURE: ICD-10-CM

## 2023-05-19 DIAGNOSIS — Z92.89 HISTORY OF CARDIOVERSION: ICD-10-CM

## 2023-05-19 DIAGNOSIS — I10 ESSENTIAL HYPERTENSION, BENIGN: Chronic | ICD-10-CM

## 2023-05-19 DIAGNOSIS — Z86.79 S/P ABLATION OF ATRIAL FLUTTER: ICD-10-CM

## 2023-05-19 DIAGNOSIS — Z86.79 S/P ABLATION OF ATRIAL FIBRILLATION: ICD-10-CM

## 2023-05-19 DIAGNOSIS — U07.1 COVID: ICD-10-CM

## 2023-05-19 DIAGNOSIS — Z98.890 S/P ABLATION OF ATRIAL FLUTTER: ICD-10-CM

## 2023-05-19 LAB
CHOLEST SERPL-MCNC: 131 MG/DL (ref 120–199)
CHOLEST/HDLC SERPL: 2.6 {RATIO} (ref 2–5)
HDLC SERPL-MCNC: 50 MG/DL (ref 40–75)
HDLC SERPL: 38.2 % (ref 20–50)
LDLC SERPL CALC-MCNC: 62.8 MG/DL (ref 63–159)
NONHDLC SERPL-MCNC: 81 MG/DL
TRIGL SERPL-MCNC: 91 MG/DL (ref 30–150)

## 2023-05-19 PROCEDURE — 3078F PR MOST RECENT DIASTOLIC BLOOD PRESSURE < 80 MM HG: ICD-10-PCS | Mod: CPTII,,, | Performed by: INTERNAL MEDICINE

## 2023-05-19 PROCEDURE — 3074F PR MOST RECENT SYSTOLIC BLOOD PRESSURE < 130 MM HG: ICD-10-PCS | Mod: CPTII,,, | Performed by: INTERNAL MEDICINE

## 2023-05-19 PROCEDURE — 1101F PT FALLS ASSESS-DOCD LE1/YR: CPT | Mod: CPTII,,, | Performed by: INTERNAL MEDICINE

## 2023-05-19 PROCEDURE — 1159F MED LIST DOCD IN RCRD: CPT | Mod: CPTII,,, | Performed by: INTERNAL MEDICINE

## 2023-05-19 PROCEDURE — 80061 LIPID PANEL: CPT | Performed by: INTERNAL MEDICINE

## 2023-05-19 PROCEDURE — 99204 PR OFFICE/OUTPT VISIT, NEW, LEVL IV, 45-59 MIN: ICD-10-PCS | Mod: ,,, | Performed by: INTERNAL MEDICINE

## 2023-05-19 PROCEDURE — 3074F SYST BP LT 130 MM HG: CPT | Mod: CPTII,,, | Performed by: INTERNAL MEDICINE

## 2023-05-19 PROCEDURE — 4010F PR ACE/ARB THEARPY RXD/TAKEN: ICD-10-PCS | Mod: CPTII,,, | Performed by: INTERNAL MEDICINE

## 2023-05-19 PROCEDURE — 3288F FALL RISK ASSESSMENT DOCD: CPT | Mod: CPTII,,, | Performed by: INTERNAL MEDICINE

## 2023-05-19 PROCEDURE — 4010F ACE/ARB THERAPY RXD/TAKEN: CPT | Mod: CPTII,,, | Performed by: INTERNAL MEDICINE

## 2023-05-19 PROCEDURE — 1126F PR PAIN SEVERITY QUANTIFIED, NO PAIN PRESENT: ICD-10-PCS | Mod: CPTII,,, | Performed by: INTERNAL MEDICINE

## 2023-05-19 PROCEDURE — 3078F DIAST BP <80 MM HG: CPT | Mod: CPTII,,, | Performed by: INTERNAL MEDICINE

## 2023-05-19 PROCEDURE — 99215 OFFICE O/P EST HI 40 MIN: CPT | Performed by: INTERNAL MEDICINE

## 2023-05-19 PROCEDURE — 36415 COLL VENOUS BLD VENIPUNCTURE: CPT | Performed by: INTERNAL MEDICINE

## 2023-05-19 PROCEDURE — 3288F PR FALLS RISK ASSESSMENT DOCUMENTED: ICD-10-PCS | Mod: CPTII,,, | Performed by: INTERNAL MEDICINE

## 2023-05-19 PROCEDURE — 1101F PR PT FALLS ASSESS DOC 0-1 FALLS W/OUT INJ PAST YR: ICD-10-PCS | Mod: CPTII,,, | Performed by: INTERNAL MEDICINE

## 2023-05-19 PROCEDURE — 1159F PR MEDICATION LIST DOCUMENTED IN MEDICAL RECORD: ICD-10-PCS | Mod: CPTII,,, | Performed by: INTERNAL MEDICINE

## 2023-05-19 PROCEDURE — 99999 PR PBB SHADOW E&M-EST. PATIENT-LVL V: ICD-10-PCS | Mod: PBBFAC,,, | Performed by: INTERNAL MEDICINE

## 2023-05-19 PROCEDURE — 99999 PR PBB SHADOW E&M-EST. PATIENT-LVL V: CPT | Mod: PBBFAC,,, | Performed by: INTERNAL MEDICINE

## 2023-05-19 PROCEDURE — 1126F AMNT PAIN NOTED NONE PRSNT: CPT | Mod: CPTII,,, | Performed by: INTERNAL MEDICINE

## 2023-05-19 PROCEDURE — 3008F PR BODY MASS INDEX (BMI) DOCUMENTED: ICD-10-PCS | Mod: CPTII,,, | Performed by: INTERNAL MEDICINE

## 2023-05-19 PROCEDURE — 3008F BODY MASS INDEX DOCD: CPT | Mod: CPTII,,, | Performed by: INTERNAL MEDICINE

## 2023-05-19 PROCEDURE — 99204 OFFICE O/P NEW MOD 45 MIN: CPT | Mod: ,,, | Performed by: INTERNAL MEDICINE

## 2023-05-19 NOTE — PROGRESS NOTES
"Subjective:   Patient ID:  Rachelle Currie is a 73 y.o. female is a new patient who presents for evaluation of Congestive Heart Failure    Ms. Rachelle Currie is a 72 y.o., woman with PMH notable for Peristent Atrial Fibrillation [facility] HFrEF (LVEF 30-39 %). She was last seen by me on 10/27/2022. At that time, we instructed the pt to take digoxin 0.25 mg and began losartan 100mg QD for HTN, referred the pt to EP for persistent afib, and ordered a TTE for HFrEF. Pt's TTE was completed on 10/27/2022 and showed LV EF of 60-64% with a reduced average LV global longitudinal strain at -15.5%, and mild diastolic dysfunction. She arrives to the telemedicine visit today for a follow up.    Pt reports that she is "okay." Pt reports that she has not been taking her medications as prescribed. Pt did not take her medications this morning. She is out of the spironolactone. She occasionally skips days of her medications. She has not been talking Digoxin. Pt has been unable to enter her [employer] portal. Pt is concerned about traveling to [place] for an ablation secondary to caring for her grandchildren and interpersonal stressors. Pt reports that Dr. Silverio Lim has retired. She denies any chest pain, SOB, palpitations, syncope, and presyncope.     Echo 2023   1. Atrial fibrillation.    2. Left ventricular wall thickness is mildly increased.    3. Mildly decreased left ventricular systolic function with beat to beat variability.    4. Estimated left ventricular ejection fraction is 40 to 45%.    5. Right ventricular systolic function appears normal.    6. No significant valvular abnormalities.    7. No flow detected by color Doppler across atrial septum.      Coronary angiography    Coronary Findings Diagnostic Dominance: Right   Left Main: The vessel was visualized by angiography, is moderate in size and is angiographically normal.   Left Anterior Descending: The vessel was " "visualized by angiography, is moderate in size and is angiographically normal.    Left Circumflex: The vessel was visualized by angiography, is moderate in size and is angiographically normal.   Right Coronary Artery: The vessel was visualized by angiography, is moderate in size and is angiographically normal.   HPI:   Patient is c/o significant fatigue  ROCKWELL and some orthopnea worse last few years  No energy  She has salvos of fast heart beats and she lets it calm down on its own  Patient is not taking diltiazem, dig and spirinolactone    Patient Active Problem List   Diagnosis    Essential hypertension, benign    Paroxysmal atrial fibrillation    COVID    Chronic diastolic heart failure    BMI 36.0-36.9,adult    History of cardioversion    Hyperglycemia    Hyperlipidemia    Major depressive disorder with single episode, in remission    Nonischemic cardiomyopathy    Obesity, morbid    Osteopenia    Plantar fasciitis    S/P ablation of atrial fibrillation    S/P ablation of atrial flutter    Vitamin D deficiency    A-fib     /70   Pulse 97   Ht 5' 6" (1.676 m)   Wt 97.1 kg (214 lb 1.1 oz)   SpO2 98%   BMI 34.55 kg/m²   Body mass index is 34.55 kg/m².  CrCl cannot be calculated (Patient's most recent lab result is older than the maximum 7 days allowed.).    Lab Results   Component Value Date     (L) 01/10/2023    K 4.8 01/10/2023     01/10/2023    CO2 23 01/10/2023    BUN 14 01/10/2023    CREATININE 0.8 01/10/2023    GLU 91 01/10/2023    MG 1.9 12/27/2022    AST 26 12/27/2022    ALT 32 12/27/2022    ALBUMIN 3.7 12/27/2022    PROT 7.6 12/27/2022    BILITOT 0.6 12/27/2022    WBC 11.73 09/05/2021    HGB 14.1 09/05/2021    HCT 43 09/05/2021    HCT 41.3 09/05/2021    MCV 90 09/05/2021     09/05/2021    TSH 1.992 12/27/2022    CHOL 210 (H) 01/11/2018    HDL 69 01/11/2018    LDLCALC 126.4 01/11/2018    TRIG 73 01/11/2018       Current Outpatient Medications   Medication Sig    empagliflozin " (JARDIANCE) 10 mg tablet Take 10 mg by mouth.    ergocalciferol (ERGOCALCIFEROL) 50,000 unit Cap Take by mouth.    losartan (COZAAR) 100 MG tablet Take 100 mg by mouth once daily.    metoprolol succinate (TOPROL-XL) 50 MG 24 hr tablet Take 50 mg by mouth.    multivitamin with minerals tablet Take 1 tablet by mouth. Occasionally    rivaroxaban (XARELTO) 20 mg Tab Take 20 mg by mouth daily with dinner or evening meal.    digoxin (LANOXIN) 125 mcg tablet Take 0.125 mg by mouth once daily.    diltiaZEM (CARDIZEM) 90 MG tablet Take 90 mg by mouth daily as needed.    promethazine-dextromethorphan (PROMETHAZINE-DM) 6.25-15 mg/5 mL Syrp Take 5 mLs by mouth every 8 (eight) hours as needed (cough). (Patient not taking: Reported on 5/19/2023)    rosuvastatin (CRESTOR) 40 MG Tab Take 10 mg by mouth every evening.    spironolactone (ALDACTONE) 25 MG tablet Take 1 tablet by mouth once daily.     No current facility-administered medications for this visit.       Review of Systems   Constitutional: Positive for malaise/fatigue and weight gain. Negative for chills, decreased appetite and night sweats.   Eyes:  Negative for blurred vision, double vision, visual disturbance and visual halos.   Cardiovascular:  Negative for chest pain, claudication, cyanosis, dyspnea on exertion, irregular heartbeat, leg swelling, near-syncope, orthopnea, palpitations, paroxysmal nocturnal dyspnea and syncope.   Respiratory:  Negative for cough, hemoptysis, snoring, sputum production and wheezing.    Endocrine: Negative for cold intolerance, heat intolerance, polydipsia and polyphagia.   Hematologic/Lymphatic: Negative for adenopathy and bleeding problem. Does not bruise/bleed easily.   Skin:  Negative for flushing, itching, poor wound healing and rash.   Musculoskeletal:  Negative for arthritis, back pain, falls, gout, joint pain, joint swelling, muscle cramps, muscle weakness, myalgias, neck pain and stiffness.   Gastrointestinal:  Negative for  bloating, abdominal pain, anorexia, diarrhea, dysphagia, excessive appetite, flatus, hematemesis, jaundice, melena and nausea.   Genitourinary:  Negative for hesitancy and incomplete emptying.   Neurological:  Negative for aphonia, brief paralysis, difficulty with concentration, disturbances in coordination, excessive daytime sleepiness, dizziness, focal weakness, light-headedness, loss of balance and weakness.   Psychiatric/Behavioral:  Negative for altered mental status, depression, hallucinations, hypervigilance, memory loss, substance abuse and suicidal ideas. The patient does not have insomnia and is not nervous/anxious.      Objective:   Physical Exam    Assessment:     1. Paroxysmal atrial fibrillation    2. Atrial fibrillation with controlled ventricular response    3. Chronic diastolic heart failure    4. Essential hypertension, benign    5. History of cardioversion    6. Nonischemic cardiomyopathy    7. S/P ablation of atrial fibrillation    8. S/P ablation of atrial flutter    9. COVID    10. BMI 36.0-36.9,adult        Plan:     Rachelle was seen today for congestive heart failure.    Diagnoses and all orders for this visit:    Paroxysmal atrial fibrillation    Atrial fibrillation with controlled ventricular response  -     Ambulatory referral/consult to Cardiology  -     Echo Saline Bubble? No; Future    Chronic diastolic heart failure  -     Echo Saline Bubble? No; Future    Essential hypertension, benign    History of cardioversion    Nonischemic cardiomyopathy  -     Echo Saline Bubble? No; Future    S/P ablation of atrial fibrillation    S/P ablation of atrial flutter    COVID    BMI 36.0-36.9,adult      Counseled on importance of heart healthy diet low in saturated and trans fat and salt as well gradually starting a regular aerobic exercise regimen with goal of 30min 5x/week. Recommend BP diary. Call if systolic BP > 130 mmHg on checking repeatedly  Limit sodium intake to less then 2 gram sodium and  1500cc fluid restriction.  Graded exercise program as tolerated.  Call if  more than 3 lbs in 1 day or 5 lbs in 1 week.

## 2023-05-22 PROBLEM — R73.01 IFG (IMPAIRED FASTING GLUCOSE): Status: ACTIVE | Noted: 2023-05-22

## 2023-05-22 PROBLEM — U07.1 COVID: Status: RESOLVED | Noted: 2022-04-25 | Resolved: 2023-05-22

## 2023-05-22 PROBLEM — R73.9 HYPERGLYCEMIA: Status: RESOLVED | Noted: 2022-01-18 | Resolved: 2023-05-22

## 2023-05-22 NOTE — PROGRESS NOTES
As Dr. Romano requested me to do is to inform  about her test results. I called and informed Mrs. Currie that her cholesterol level is normal and no changes in medications. She verbalized and understood.

## 2023-06-15 ENCOUNTER — LAB VISIT (OUTPATIENT)
Dept: LAB | Facility: HOSPITAL | Age: 73
End: 2023-06-15
Attending: INTERNAL MEDICINE
Payer: MEDICARE

## 2023-06-15 DIAGNOSIS — R73.01 IFG (IMPAIRED FASTING GLUCOSE): ICD-10-CM

## 2023-06-15 DIAGNOSIS — I10 ESSENTIAL HYPERTENSION, BENIGN: Chronic | ICD-10-CM

## 2023-06-15 DIAGNOSIS — I42.8 NONISCHEMIC CARDIOMYOPATHY: ICD-10-CM

## 2023-06-15 DIAGNOSIS — E78.5 HYPERLIPIDEMIA, UNSPECIFIED HYPERLIPIDEMIA TYPE: ICD-10-CM

## 2023-06-15 DIAGNOSIS — E55.9 VITAMIN D DEFICIENCY: ICD-10-CM

## 2023-06-15 LAB
25(OH)D3+25(OH)D2 SERPL-MCNC: 27 NG/ML (ref 30–96)
ALBUMIN SERPL BCP-MCNC: 3.5 G/DL (ref 3.5–5.2)
ALP SERPL-CCNC: 85 U/L (ref 55–135)
ALT SERPL W/O P-5'-P-CCNC: 25 U/L (ref 10–44)
ANION GAP SERPL CALC-SCNC: 8 MMOL/L (ref 8–16)
AST SERPL-CCNC: 23 U/L (ref 10–40)
BASOPHILS # BLD AUTO: 0.04 K/UL (ref 0–0.2)
BASOPHILS NFR BLD: 0.7 % (ref 0–1.9)
BILIRUB SERPL-MCNC: 0.5 MG/DL (ref 0.1–1)
BUN SERPL-MCNC: 14 MG/DL (ref 8–23)
CALCIUM SERPL-MCNC: 9.4 MG/DL (ref 8.7–10.5)
CHLORIDE SERPL-SCNC: 108 MMOL/L (ref 95–110)
CHOLEST SERPL-MCNC: 153 MG/DL (ref 120–199)
CHOLEST/HDLC SERPL: 2.7 {RATIO} (ref 2–5)
CO2 SERPL-SCNC: 23 MMOL/L (ref 23–29)
CREAT SERPL-MCNC: 0.9 MG/DL (ref 0.5–1.4)
DIFFERENTIAL METHOD: ABNORMAL
EOSINOPHIL # BLD AUTO: 0.2 K/UL (ref 0–0.5)
EOSINOPHIL NFR BLD: 2.6 % (ref 0–8)
ERYTHROCYTE [DISTWIDTH] IN BLOOD BY AUTOMATED COUNT: 15.5 % (ref 11.5–14.5)
EST. GFR  (NO RACE VARIABLE): >60 ML/MIN/1.73 M^2
ESTIMATED AVG GLUCOSE: 105 MG/DL (ref 68–131)
GLUCOSE SERPL-MCNC: 89 MG/DL (ref 70–110)
HBA1C MFR BLD: 5.3 % (ref 4–5.6)
HCT VFR BLD AUTO: 46.8 % (ref 37–48.5)
HDLC SERPL-MCNC: 56 MG/DL (ref 40–75)
HDLC SERPL: 36.6 % (ref 20–50)
HGB BLD-MCNC: 14.8 G/DL (ref 12–16)
IMM GRANULOCYTES # BLD AUTO: 0.01 K/UL (ref 0–0.04)
IMM GRANULOCYTES NFR BLD AUTO: 0.2 % (ref 0–0.5)
LDLC SERPL CALC-MCNC: 78.2 MG/DL (ref 63–159)
LYMPHOCYTES # BLD AUTO: 2 K/UL (ref 1–4.8)
LYMPHOCYTES NFR BLD: 34.4 % (ref 18–48)
MCH RBC QN AUTO: 30.4 PG (ref 27–31)
MCHC RBC AUTO-ENTMCNC: 31.6 G/DL (ref 32–36)
MCV RBC AUTO: 96 FL (ref 82–98)
MONOCYTES # BLD AUTO: 0.5 K/UL (ref 0.3–1)
MONOCYTES NFR BLD: 8 % (ref 4–15)
NEUTROPHILS # BLD AUTO: 3.1 K/UL (ref 1.8–7.7)
NEUTROPHILS NFR BLD: 54.1 % (ref 38–73)
NONHDLC SERPL-MCNC: 97 MG/DL
NRBC BLD-RTO: 0 /100 WBC
PLATELET # BLD AUTO: 267 K/UL (ref 150–450)
PMV BLD AUTO: 10.6 FL (ref 9.2–12.9)
POTASSIUM SERPL-SCNC: 4.7 MMOL/L (ref 3.5–5.1)
PROT SERPL-MCNC: 7.4 G/DL (ref 6–8.4)
RBC # BLD AUTO: 4.87 M/UL (ref 4–5.4)
SODIUM SERPL-SCNC: 139 MMOL/L (ref 136–145)
TRIGL SERPL-MCNC: 94 MG/DL (ref 30–150)
TSH SERPL DL<=0.005 MIU/L-ACNC: 1.6 UIU/ML (ref 0.4–4)
WBC # BLD AUTO: 5.76 K/UL (ref 3.9–12.7)

## 2023-06-15 PROCEDURE — 82306 VITAMIN D 25 HYDROXY: CPT | Performed by: INTERNAL MEDICINE

## 2023-06-15 PROCEDURE — 80053 COMPREHEN METABOLIC PANEL: CPT | Performed by: INTERNAL MEDICINE

## 2023-06-15 PROCEDURE — 80061 LIPID PANEL: CPT | Performed by: INTERNAL MEDICINE

## 2023-06-15 PROCEDURE — 36415 COLL VENOUS BLD VENIPUNCTURE: CPT | Mod: PO | Performed by: INTERNAL MEDICINE

## 2023-06-15 PROCEDURE — 85025 COMPLETE CBC W/AUTO DIFF WBC: CPT | Performed by: INTERNAL MEDICINE

## 2023-06-15 PROCEDURE — 83036 HEMOGLOBIN GLYCOSYLATED A1C: CPT | Performed by: INTERNAL MEDICINE

## 2023-06-15 PROCEDURE — 84443 ASSAY THYROID STIM HORMONE: CPT | Performed by: INTERNAL MEDICINE

## 2023-06-26 ENCOUNTER — TELEPHONE (OUTPATIENT)
Dept: SURGERY | Facility: CLINIC | Age: 73
End: 2023-06-26
Payer: MEDICARE

## 2023-06-29 ENCOUNTER — TELEPHONE (OUTPATIENT)
Dept: BARIATRICS | Facility: CLINIC | Age: 73
End: 2023-06-29
Payer: MEDICARE

## 2023-07-21 ENCOUNTER — HOSPITAL ENCOUNTER (OUTPATIENT)
Dept: CARDIOLOGY | Facility: HOSPITAL | Age: 73
Discharge: HOME | End: 2023-07-21
Attending: INTERNAL MEDICINE
Payer: MEDICARE

## 2023-07-21 VITALS
WEIGHT: 214 LBS | SYSTOLIC BLOOD PRESSURE: 136 MMHG | HEART RATE: 89 BPM | DIASTOLIC BLOOD PRESSURE: 90 MMHG | BODY MASS INDEX: 34.39 KG/M2 | HEIGHT: 66 IN

## 2023-07-21 DIAGNOSIS — I48.91 ATRIAL FIBRILLATION WITH CONTROLLED VENTRICULAR RESPONSE: ICD-10-CM

## 2023-07-21 DIAGNOSIS — I42.8 NONISCHEMIC CARDIOMYOPATHY: ICD-10-CM

## 2023-07-21 DIAGNOSIS — I50.32 CHRONIC DIASTOLIC HEART FAILURE: ICD-10-CM

## 2023-07-21 LAB
ASCENDING AORTA: 3.25 CM
AV INDEX (PROSTH): 0.7
AV MEAN GRADIENT: 2 MMHG
AV PEAK GRADIENT: 4 MMHG
AV VALVE AREA: 2.41 CM2
AV VELOCITY RATIO: 0.67
BSA FOR ECHO PROCEDURE: 2.13 M2
CV ECHO LV RWT: 0.4 CM
DOP CALC AO PEAK VEL: 0.96 M/S
DOP CALC AO VTI: 17.15 CM
DOP CALC LVOT AREA: 3.5 CM2
DOP CALC LVOT DIAMETER: 2.1 CM
DOP CALC LVOT PEAK VEL: 0.64 M/S
DOP CALC LVOT STROKE VOLUME: 41.4 CM3
DOP CALCLVOT PEAK VEL VTI: 11.96 CM
E/E' RATIO: 9.89 M/S
ECHO LV POSTERIOR WALL: 0.86 CM (ref 0.6–1.1)
EJECTION FRACTION: 60 %
FRACTIONAL SHORTENING: 26 % (ref 28–44)
INTERVENTRICULAR SEPTUM: 0.77 CM (ref 0.6–1.1)
IVRT: 77.07 MSEC
LA MAJOR: 6.36 CM
LA MINOR: 6.4 CM
LA WIDTH: 4.76 CM
LEFT ATRIUM SIZE: 3.98 CM
LEFT ATRIUM VOLUME INDEX MOD: 42.2 ML/M2
LEFT ATRIUM VOLUME INDEX: 49.9 ML/M2
LEFT ATRIUM VOLUME MOD: 87 CM3
LEFT ATRIUM VOLUME: 102.74 CM3
LEFT INTERNAL DIMENSION IN SYSTOLE: 3.19 CM (ref 2.1–4)
LEFT VENTRICLE DIASTOLIC VOLUME INDEX: 40.24 ML/M2
LEFT VENTRICLE DIASTOLIC VOLUME: 82.9 ML
LEFT VENTRICLE MASS INDEX: 52 G/M2
LEFT VENTRICLE SYSTOLIC VOLUME INDEX: 19.8 ML/M2
LEFT VENTRICLE SYSTOLIC VOLUME: 40.71 ML
LEFT VENTRICULAR INTERNAL DIMENSION IN DIASTOLE: 4.3 CM (ref 3.5–6)
LEFT VENTRICULAR MASS: 107.95 G
LV LATERAL E/E' RATIO: 8.9 M/S
LV SEPTAL E/E' RATIO: 11.13 M/S
MV PEAK E VEL: 0.89 M/S
PISA TR MAX VEL: 2.35 M/S
PULM VEIN S/D RATIO: 0.6
PV PEAK D VEL: 0.48 M/S
PV PEAK S VEL: 0.29 M/S
RA MAJOR: 6.65 CM
RA PRESSURE: 3 MMHG
RA WIDTH: 4.66 CM
RIGHT ATRIAL AREA: 25 CM2
RIGHT VENTRICULAR END-DIASTOLIC DIMENSION: 2.64 CM
SINUS: 2.97 CM
STJ: 2.9 CM
TDI LATERAL: 0.1 M/S
TDI SEPTAL: 0.08 M/S
TDI: 0.09 M/S
TR MAX PG: 22 MMHG
TRICUSPID ANNULAR PLANE SYSTOLIC EXCURSION: 1.18 CM
TV REST PULMONARY ARTERY PRESSURE: 25 MMHG

## 2023-07-21 PROCEDURE — 93306 TTE W/DOPPLER COMPLETE: CPT

## 2023-07-21 PROCEDURE — 93306 TTE W/DOPPLER COMPLETE: CPT | Mod: 26,,, | Performed by: INTERNAL MEDICINE

## 2023-07-21 PROCEDURE — 93306 ECHO (CUPID ONLY): ICD-10-PCS | Mod: 26,,, | Performed by: INTERNAL MEDICINE

## 2023-07-24 ENCOUNTER — TELEPHONE (OUTPATIENT)
Dept: CARDIOLOGY | Facility: CLINIC | Age: 73
End: 2023-07-24
Payer: MEDICARE

## 2023-08-10 ENCOUNTER — TELEPHONE (OUTPATIENT)
Dept: INTERNAL MEDICINE | Facility: CLINIC | Age: 73
End: 2023-08-10
Payer: MEDICARE

## 2023-08-14 ENCOUNTER — TELEPHONE (OUTPATIENT)
Dept: INTERNAL MEDICINE | Facility: CLINIC | Age: 73
End: 2023-08-14
Payer: MEDICARE

## 2023-08-14 DIAGNOSIS — Z12.31 ENCOUNTER FOR SCREENING MAMMOGRAM FOR BREAST CANCER: Primary | ICD-10-CM

## 2023-08-14 DIAGNOSIS — Z12.11 SCREEN FOR COLON CANCER: ICD-10-CM

## 2023-08-14 NOTE — TELEPHONE ENCOUNTER
Mammogram and cscope orders signed     She is due for 2nd shingrix vaccine and covid Booster  She is also able to complete a RSV vaccine     However it is recommended to avoid vaccines for 4 weeks prior to completing a mammogram to avoid temporary reactive lymph nodes in response to any vaccine from being present when the mammogram is completed. She should arrange vaccines and mammogram with that timing in mind.

## 2023-08-14 NOTE — TELEPHONE ENCOUNTER
----- Message from Brayden Amos sent at 8/14/2023  1:12 PM CDT -----  Type: Patient Call Back    Who called:Self    What is the request in detail:Mammogram and a few questions    Can the clinic reply by [facility]?No    Would the patient rather a call back or a response via [facility]? Call    Best call back number:.[phone] (home)     Additional Information:

## 2023-08-14 NOTE — TELEPHONE ENCOUNTER
States will be leaving the country the month of October and 1st week of November. Would like to have Mammogram scheduled along with Colonoscopy prior to leaving. Would also like MD order any injections that she may be due

## 2023-08-15 ENCOUNTER — PATIENT MESSAGE (OUTPATIENT)
Dept: ADMINISTRATIVE | Facility: HOSPITAL | Age: 73
End: 2023-08-15
Payer: MEDICARE

## 2023-08-17 NOTE — TELEPHONE ENCOUNTER
Voicemail has been left in regards to MD advicing. Patient has been advised the following via voicemail.     Mammogram and cscope orders signed      She is due for 2nd shingrix vaccine and covid Booster  She is also able to complete a RSV vaccine      However it is recommended to avoid vaccines for 4 weeks prior to completing a mammogram to avoid temporary reactive lymph nodes in response to any vaccine from being present when the mammogram is completed. She should arrange vaccines and mammogram with that timing in mind.       Routing to staff as second attempt, please make third.

## 2023-08-21 ENCOUNTER — PATIENT OUTREACH (OUTPATIENT)
Dept: ADMINISTRATIVE | Facility: HOSPITAL | Age: 73
End: 2023-08-21
Payer: MEDICARE

## 2023-08-21 ENCOUNTER — HOSPITAL ENCOUNTER (OUTPATIENT)
Dept: RADIOLOGY | Facility: HOSPITAL | Age: 73
Discharge: HOME | End: 2023-08-21
Attending: INTERNAL MEDICINE
Payer: MEDICARE

## 2023-08-21 VITALS — WEIGHT: 214 LBS | HEIGHT: 66 IN | BODY MASS INDEX: 34.39 KG/M2

## 2023-08-21 DIAGNOSIS — Z12.31 ENCOUNTER FOR SCREENING MAMMOGRAM FOR BREAST CANCER: ICD-10-CM

## 2023-08-21 PROCEDURE — 77063 MAMMO DIGITAL SCREENING BILAT WITH TOMO: ICD-10-PCS | Mod: 26,,, | Performed by: RADIOLOGY

## 2023-08-21 PROCEDURE — 77067 MAMMO DIGITAL SCREENING BILAT WITH TOMO: ICD-10-PCS | Mod: 26,,, | Performed by: RADIOLOGY

## 2023-08-21 PROCEDURE — 77063 BREAST TOMOSYNTHESIS BI: CPT | Mod: 26,,, | Performed by: RADIOLOGY

## 2023-08-21 PROCEDURE — 77067 SCR MAMMO BI INCL CAD: CPT | Mod: 26,,, | Performed by: RADIOLOGY

## 2023-08-21 PROCEDURE — 77067 SCR MAMMO BI INCL CAD: CPT | Mod: TC

## 2023-08-21 NOTE — PROGRESS NOTES
After reviewing Ms. Currie's chart, she is scheduled for her mammogram on 8/21/2023 and colon cancer pre-admit on 11/2/2023

## 2023-09-14 ENCOUNTER — TELEPHONE (OUTPATIENT)
Dept: INTERNAL MEDICINE | Facility: CLINIC | Age: 73
End: 2023-09-14
Payer: MEDICARE

## 2023-09-14 NOTE — TELEPHONE ENCOUNTER
----- Message from Ben Graham sent at 9/14/2023 12:49 PM CDT -----  Name of Who is Calling:SARY SINGER [[id number]]           What is the request in detail: pt stated that she would like an appt for next week. She stated she has somewhere to be at 3pm.Please contact to further discuss and advise.            Can the clinic reply by [facility]:[phone]           What Number to Call Back if not in [facility]:[phone]

## 2023-09-14 NOTE — TELEPHONE ENCOUNTER
Patient of Dr. Mohan, she is Dr. Beard's mother n law. She is wanting a appt. Next week to  get injections before a trip out the country in October. Dr. Beard said she need to come in to see Dr. Mohan

## 2023-09-14 NOTE — TELEPHONE ENCOUNTER
On chart check she is due for a flu vaccine and a 2nd shingles vaccine  - she will be due for the newer Covid booster once available in October  - all of these vaccines can be completed at her pharmacy without a prescription.     - is she requesting an appointment for travel advice? If so then I typically refer to the travel clinic or [facility] for vaccines beyond those above.   - please let me know and to where she will be traveling

## 2023-09-14 NOTE — TELEPHONE ENCOUNTER
----- Message from Roxy Holley sent at 9/14/2023  8:26 AM CDT -----  Contact: SARY SINGER [[id number]]  Type: Call Back      Who called: SARY SINGER [[id number]]      What is the request in detail: Patient is requesting a call back. Pt states that she is leaving the country and she would like to discuss immunizations.   Please advise.     Can the clinic reply by [facility]? No      Would the patient rather a call back or a response via [facility]? Call back       Best call back number: [phone]      Additional Information:

## 2023-09-15 ENCOUNTER — TELEPHONE (OUTPATIENT)
Dept: INTERNAL MEDICINE | Facility: CLINIC | Age: 73
End: 2023-09-15
Payer: MEDICARE

## 2023-09-15 NOTE — TELEPHONE ENCOUNTER
----- Message from Roxy Holley sent at 9/15/2023  9:57 AM CDT -----  Contact: SARY SINGER [[id number]]  Type:  Patient Returning Call      Who Called:   SARY SINGER [[id number]]      Who Left Message for Patient: Pia Khanna MA      Does the patient know what this is regarding?: Yes      Would the patient rather a call back or a response via [facility]?  Call back       Best Call Back Number: [phone] ([place])       Additional Information:

## 2023-09-15 NOTE — TELEPHONE ENCOUNTER
Spoke with pt, they had a verbal understanding.    Per Dr. Mohan in separate encounter:    'On chart check she is due for a flu vaccine and a 2nd shingles vaccine  - she will be due for the newer Covid booster once available in October  - all of these vaccines can be completed at her pharmacy without a prescription.      - is she requesting an appointment for travel advice? If so then I typically refer to the travel clinic or [facility] for vaccines beyond those above.   - please let me know and to where she will be traveling'

## 2023-09-18 ENCOUNTER — OFFICE VISIT (OUTPATIENT)
Dept: CARDIOLOGY | Facility: CLINIC | Age: 73
End: 2023-09-18
Payer: MEDICARE

## 2023-09-18 ENCOUNTER — HOSPITAL ENCOUNTER (OUTPATIENT)
Dept: CARDIOLOGY | Facility: CLINIC | Age: 73
Discharge: HOME | End: 2023-09-18
Payer: MEDICARE

## 2023-09-18 VITALS
SYSTOLIC BLOOD PRESSURE: 143 MMHG | DIASTOLIC BLOOD PRESSURE: 79 MMHG | BODY MASS INDEX: 34.51 KG/M2 | WEIGHT: 214.75 LBS | HEIGHT: 66 IN | HEART RATE: 86 BPM

## 2023-09-18 DIAGNOSIS — E78.2 MIXED HYPERLIPIDEMIA: ICD-10-CM

## 2023-09-18 DIAGNOSIS — I48.0 PAROXYSMAL ATRIAL FIBRILLATION: ICD-10-CM

## 2023-09-18 DIAGNOSIS — I42.8 NONISCHEMIC CARDIOMYOPATHY: ICD-10-CM

## 2023-09-18 DIAGNOSIS — Z86.79 S/P ABLATION OF ATRIAL FLUTTER: ICD-10-CM

## 2023-09-18 DIAGNOSIS — Z98.890 S/P ABLATION OF ATRIAL FLUTTER: ICD-10-CM

## 2023-09-18 DIAGNOSIS — R63.5 WEIGHT GAIN: ICD-10-CM

## 2023-09-18 DIAGNOSIS — Z86.79 S/P ABLATION OF ATRIAL FIBRILLATION: ICD-10-CM

## 2023-09-18 DIAGNOSIS — Z98.890 S/P ABLATION OF ATRIAL FIBRILLATION: ICD-10-CM

## 2023-09-18 DIAGNOSIS — I50.32 CHRONIC DIASTOLIC HEART FAILURE: Primary | ICD-10-CM

## 2023-09-18 DIAGNOSIS — E66.01 OBESITY, MORBID: ICD-10-CM

## 2023-09-18 DIAGNOSIS — Z92.89 HISTORY OF CARDIOVERSION: ICD-10-CM

## 2023-09-18 DIAGNOSIS — I10 ESSENTIAL HYPERTENSION, BENIGN: Chronic | ICD-10-CM

## 2023-09-18 PROCEDURE — 3044F HG A1C LEVEL LT 7.0%: CPT | Mod: CPTII,S$GLB,, | Performed by: INTERNAL MEDICINE

## 2023-09-18 PROCEDURE — 4010F PR ACE/ARB THEARPY RXD/TAKEN: ICD-10-PCS | Mod: CPTII,S$GLB,, | Performed by: INTERNAL MEDICINE

## 2023-09-18 PROCEDURE — 99999 PR PBB SHADOW E&M-EST. PATIENT-LVL IV: ICD-10-PCS | Mod: PBBFAC,,, | Performed by: INTERNAL MEDICINE

## 2023-09-18 PROCEDURE — 1101F PT FALLS ASSESS-DOCD LE1/YR: CPT | Mod: CPTII,S$GLB,, | Performed by: INTERNAL MEDICINE

## 2023-09-18 PROCEDURE — 99214 OFFICE O/P EST MOD 30 MIN: CPT | Mod: S$GLB,,, | Performed by: INTERNAL MEDICINE

## 2023-09-18 PROCEDURE — 1160F PR REVIEW ALL MEDS BY PRESCRIBER/CLIN PHARMACIST DOCUMENTED: ICD-10-PCS | Mod: CPTII,S$GLB,, | Performed by: INTERNAL MEDICINE

## 2023-09-18 PROCEDURE — 3078F PR MOST RECENT DIASTOLIC BLOOD PRESSURE < 80 MM HG: ICD-10-PCS | Mod: CPTII,S$GLB,, | Performed by: INTERNAL MEDICINE

## 2023-09-18 PROCEDURE — 93000 EKG 12-LEAD: ICD-10-PCS | Mod: S$GLB,,, | Performed by: INTERNAL MEDICINE

## 2023-09-18 PROCEDURE — 93000 ELECTROCARDIOGRAM COMPLETE: CPT | Mod: S$GLB,,, | Performed by: INTERNAL MEDICINE

## 2023-09-18 PROCEDURE — 1159F MED LIST DOCD IN RCRD: CPT | Mod: CPTII,S$GLB,, | Performed by: INTERNAL MEDICINE

## 2023-09-18 PROCEDURE — 3044F PR MOST RECENT HEMOGLOBIN A1C LEVEL <7.0%: ICD-10-PCS | Mod: CPTII,S$GLB,, | Performed by: INTERNAL MEDICINE

## 2023-09-18 PROCEDURE — 3288F PR FALLS RISK ASSESSMENT DOCUMENTED: ICD-10-PCS | Mod: CPTII,S$GLB,, | Performed by: INTERNAL MEDICINE

## 2023-09-18 PROCEDURE — 1160F RVW MEDS BY RX/DR IN RCRD: CPT | Mod: CPTII,S$GLB,, | Performed by: INTERNAL MEDICINE

## 2023-09-18 PROCEDURE — 3077F SYST BP >= 140 MM HG: CPT | Mod: CPTII,S$GLB,, | Performed by: INTERNAL MEDICINE

## 2023-09-18 PROCEDURE — 99214 PR OFFICE/OUTPT VISIT, EST, LEVL IV, 30-39 MIN: ICD-10-PCS | Mod: S$GLB,,, | Performed by: INTERNAL MEDICINE

## 2023-09-18 PROCEDURE — 1159F PR MEDICATION LIST DOCUMENTED IN MEDICAL RECORD: ICD-10-PCS | Mod: CPTII,S$GLB,, | Performed by: INTERNAL MEDICINE

## 2023-09-18 PROCEDURE — 3078F DIAST BP <80 MM HG: CPT | Mod: CPTII,S$GLB,, | Performed by: INTERNAL MEDICINE

## 2023-09-18 PROCEDURE — 3077F PR MOST RECENT SYSTOLIC BLOOD PRESSURE >= 140 MM HG: ICD-10-PCS | Mod: CPTII,S$GLB,, | Performed by: INTERNAL MEDICINE

## 2023-09-18 PROCEDURE — 4010F ACE/ARB THERAPY RXD/TAKEN: CPT | Mod: CPTII,S$GLB,, | Performed by: INTERNAL MEDICINE

## 2023-09-18 PROCEDURE — 1101F PR PT FALLS ASSESS DOC 0-1 FALLS W/OUT INJ PAST YR: ICD-10-PCS | Mod: CPTII,S$GLB,, | Performed by: INTERNAL MEDICINE

## 2023-09-18 PROCEDURE — 3008F BODY MASS INDEX DOCD: CPT | Mod: CPTII,S$GLB,, | Performed by: INTERNAL MEDICINE

## 2023-09-18 PROCEDURE — 3008F PR BODY MASS INDEX (BMI) DOCUMENTED: ICD-10-PCS | Mod: CPTII,S$GLB,, | Performed by: INTERNAL MEDICINE

## 2023-09-18 PROCEDURE — 1126F PR PAIN SEVERITY QUANTIFIED, NO PAIN PRESENT: ICD-10-PCS | Mod: CPTII,S$GLB,, | Performed by: INTERNAL MEDICINE

## 2023-09-18 PROCEDURE — 3288F FALL RISK ASSESSMENT DOCD: CPT | Mod: CPTII,S$GLB,, | Performed by: INTERNAL MEDICINE

## 2023-09-18 PROCEDURE — 99999 PR PBB SHADOW E&M-EST. PATIENT-LVL IV: CPT | Mod: PBBFAC,,, | Performed by: INTERNAL MEDICINE

## 2023-09-18 PROCEDURE — 1126F AMNT PAIN NOTED NONE PRSNT: CPT | Mod: CPTII,S$GLB,, | Performed by: INTERNAL MEDICINE

## 2023-09-18 RX ORDER — METOPROLOL SUCCINATE 50 MG/1
50 TABLET, EXTENDED RELEASE ORAL DAILY
Qty: 90 TABLET | Refills: 3 | Status: SHIPPED | OUTPATIENT
Start: 2023-09-18

## 2023-09-18 NOTE — PROGRESS NOTES
"Subjective:   Patient ID:  Rachelle Currie is a 73 y.o. female who presents for follow-up of Follow-up    Rachelle Currie is a 73 y.o. female is a new patient who presents for evaluation of Congestive Heart Failure     Ms. Rachelle Currie is a 72 y.o., woman with PMH notable for Peristent Atrial Fibrillation s/p ablation x2 ( in [facility], about 10 years ago), HFrEF (LVEF 30-39 %). She was last seen by me on 10/27/2022. At that time, we instructed the pt to take digoxin 0.25 mg and began losartan 100mg QD for HTN, referred the pt to EP for persistent afib, and ordered a TTE for HFrEF. Pt's TTE was completed on 10/27/2022 and showed LV EF of 60-64% with a reduced average LV global longitudinal strain at -15.5%, and mild diastolic dysfunction. She arrives to the telemedicine visit today for a follow up.    Pt reports that she is "okay." Pt reports that she has not been taking her medications as prescribed. Pt did not take her medications this morning. She is out of the spironolactone. She occasionally skips days of her medications. She has not been talking Digoxin. Pt has been unable to enter her [employer] portal. Pt is concerned about traveling to [place], [place] for an ablation secondary to caring for her grandchildren and interpersonal stressors. Pt reports that Dr. Silverio Lim has retired. She denies any chest pain, SOB, palpitations, syncope, and presyncope.      Echo 2023   1. Atrial fibrillation.    2. Left ventricular wall thickness is mildly increased.    3. Mildly decreased left ventricular systolic function with beat to beat variability.    4. Estimated left ventricular ejection fraction is 40 to 45%.    5. Right ventricular systolic function appears normal.    6. No significant valvular abnormalities.    7. No flow detected by color Doppler across atrial septum.       Coronary angiography    Coronary Findings Diagnostic Dominance: Right   Left Main: The vessel was visualized by angiography, is moderate in size " "and is angiographically normal.   Left Anterior Descending: The vessel was visualized by angiography, is moderate in size and is angiographically normal.    Left Circumflex: The vessel was visualized by angiography, is moderate in size and is angiographically normal.   Right Coronary Artery: The vessel was visualized by angiography, is moderate in size and is angiographically normal.   HPI:   Patient is c/o significant fatigue  ROCKWELL and some orthopnea worse last few years  No energy  She has salvos of fast heart beats and she lets it calm down on its own  Patient is not taking diltiazem, dig and spirinolactone     HPI:   Chest pan - " I massage and it gets better"  occasional Orthopnea, PND of heart failure symptoms.   occasional palpitations or fluttering in the chest  Patient says she is not taking diltiazem, MTP, losartan (" I got confused")    Patient Active Problem List   Diagnosis    Essential hypertension, benign    Paroxysmal atrial fibrillation    Chronic diastolic heart failure    History of cardioversion    Hyperlipidemia    Major depressive disorder with single episode, in remission    Nonischemic cardiomyopathy    Obesity, morbid    Osteopenia    Plantar fasciitis    S/P ablation of atrial fibrillation    S/P ablation of atrial flutter    Vitamin D deficiency    A-fib    IFG (impaired fasting glucose)     BP (!) 143/79   Pulse 86   Ht 5' 6" (1.676 m)   Wt 97.4 kg (214 lb 11.7 oz)   BMI 34.66 kg/m²   Body mass index is 34.66 kg/m².  CrCl cannot be calculated (Patient's most recent lab result is older than the maximum 7 days allowed.).    Lab Results   Component Value Date     06/15/2023    K 4.7 06/15/2023     06/15/2023    CO2 23 06/15/2023    BUN 14 06/15/2023    CREATININE 0.9 06/15/2023    GLU 89 06/15/2023    HGBA1C 5.3 06/15/2023    MG 1.9 12/27/2022    AST 23 06/15/2023    ALT 25 06/15/2023    ALBUMIN 3.5 06/15/2023    PROT 7.4 06/15/2023    BILITOT 0.5 06/15/2023    WBC 5.76 " 06/15/2023    HGB 14.8 06/15/2023    HCT 46.8 06/15/2023    HCT 43 09/05/2021    MCV 96 06/15/2023     06/15/2023    TSH 1.604 06/15/2023    CHOL 153 06/15/2023    HDL 56 06/15/2023    LDLCALC 78.2 06/15/2023    TRIG 94 06/15/2023       Current Outpatient Medications   Medication Sig    ergocalciferol (ERGOCALCIFEROL) 50,000 unit Cap Take by mouth.    multivitamin with minerals tablet Take 1 tablet by mouth. Occasionally    rivaroxaban (XARELTO) 20 mg Tab Take 20 mg by mouth daily with dinner or evening meal.    rosuvastatin (CRESTOR) 40 MG Tab Take 10 mg by mouth every evening.    spironolactone (ALDACTONE) 25 MG tablet Take 1 tablet by mouth once daily.    empagliflozin (JARDIANCE) 10 mg tablet Take 10 mg by mouth.    losartan (COZAAR) 100 MG tablet Take 100 mg by mouth once daily.    metoprolol succinate (TOPROL-XL) 50 MG 24 hr tablet Take 1 tablet (50 mg total) by mouth once daily.     No current facility-administered medications for this visit.       Review of Systems   Constitutional: Negative for chills, decreased appetite, malaise/fatigue, night sweats, weight gain and weight loss.   Eyes:  Negative for blurred vision, double vision, visual disturbance and visual halos.   Cardiovascular:  Negative for chest pain, claudication, cyanosis, dyspnea on exertion, irregular heartbeat, leg swelling, near-syncope, orthopnea, palpitations, paroxysmal nocturnal dyspnea and syncope.   Respiratory:  Negative for cough, hemoptysis, snoring, sputum production and wheezing.    Endocrine: Negative for cold intolerance, heat intolerance, polydipsia and polyphagia.   Hematologic/Lymphatic: Negative for adenopathy and bleeding problem. Does not bruise/bleed easily.   Skin:  Negative for flushing, itching, poor wound healing and rash.   Musculoskeletal:  Negative for arthritis, back pain, falls, gout, joint pain, joint swelling, muscle cramps, muscle weakness, myalgias, neck pain and stiffness.   Gastrointestinal:   Negative for bloating, abdominal pain, anorexia, diarrhea, dysphagia, excessive appetite, flatus, hematemesis, jaundice, melena and nausea.   Genitourinary:  Negative for hesitancy and incomplete emptying.   Neurological:  Negative for aphonia, brief paralysis, difficulty with concentration, disturbances in coordination, excessive daytime sleepiness, dizziness, focal weakness, light-headedness, loss of balance and weakness.   Psychiatric/Behavioral:  Negative for altered mental status, depression, hallucinations, hypervigilance, memory loss, substance abuse and suicidal ideas. The patient does not have insomnia and is not nervous/anxious.        Objective:   Physical Exam  Constitutional:       General: She is not in acute distress.     Appearance: She is well-developed. She is not diaphoretic.   HENT:      Head: Normocephalic and atraumatic.      Nose: Nose normal.      Mouth/Throat:      Pharynx: No oropharyngeal exudate.   Eyes:      General: No scleral icterus.        Right eye: No discharge.         Left eye: No discharge.      Conjunctiva/sclera: Conjunctivae normal.      Pupils: Pupils are equal, round, and reactive to light.   Neck:      Thyroid: No thyromegaly.      Vascular: No JVD.      Trachea: No tracheal deviation.   Cardiovascular:      Rate and Rhythm: Normal rate and regular rhythm.      Pulses: Intact distal pulses.      Heart sounds: Normal heart sounds. No murmur heard.     No friction rub. No gallop.   Pulmonary:      Effort: Pulmonary effort is normal. No respiratory distress.      Breath sounds: Normal breath sounds. No stridor. No wheezing or rales.   Chest:      Chest wall: No tenderness.   Abdominal:      General: Bowel sounds are normal. There is no distension.      Palpations: Abdomen is soft. There is no mass.      Tenderness: There is no abdominal tenderness. There is no guarding or rebound.   Musculoskeletal:         General: No tenderness. Normal range of motion.      Cervical back:  Normal range of motion and neck supple.   Lymphadenopathy:      Cervical: No cervical adenopathy.   Skin:     General: Skin is warm.      Coloration: Skin is not pale.      Findings: No erythema or rash.   Neurological:      Mental Status: She is alert and oriented to person, place, and time.      Cranial Nerves: No cranial nerve deficit.      Motor: No abnormal muscle tone.      Coordination: Coordination normal.      Deep Tendon Reflexes: Reflexes are normal and symmetric. Reflexes normal.   Psychiatric:         Behavior: Behavior normal.         Thought Content: Thought content normal.         Judgment: Judgment normal.         Assessment:     1. Chronic diastolic heart failure    2. Paroxysmal atrial fibrillation    3. Essential hypertension, benign    4. History of cardioversion    5. Mixed hyperlipidemia    6. Nonischemic cardiomyopathy    7. S/P ablation of atrial fibrillation    8. S/P ablation of atrial flutter    9. Obesity, morbid    10. Weight gain      Plan:   Rachelle was seen today for follow-up.    Diagnoses and all orders for this visit:    Chronic diastolic heart failure  -     Ambulatory referral/consult to Internal Medicine; Future    Paroxysmal atrial fibrillation  -     Ambulatory referral/consult to Internal Medicine; Future  -     IN OFFICE EKG 12-LEAD (to [facility])    Essential hypertension, benign  -     Ambulatory referral/consult to Internal Medicine; Future    History of cardioversion    Mixed hyperlipidemia    Nonischemic cardiomyopathy    S/P ablation of atrial fibrillation    S/P ablation of atrial flutter    Obesity, morbid    Weight gain  -     Ambulatory referral/consult to Bariatric Medicine; Future    Other orders  -     metoprolol succinate (TOPROL-XL) 50 MG 24 hr tablet; Take 1 tablet (50 mg total) by mouth once daily.      Doing well. Reiterated low salt diet. And take MTP prn for gavin for AF   Counseled on importance of heart healthy diet low in saturated and trans fat and salt  as well gradually starting a regular aerobic exercise regimen with goal of 30min 5x/week. Recommend BP diary. Call if systolic BP > 130 mmHg on checking repeatedly

## 2023-09-19 ENCOUNTER — TELEPHONE (OUTPATIENT)
Dept: BARIATRICS | Facility: CLINIC | Age: 73
End: 2023-09-19
Payer: MEDICARE

## 2023-09-21 ENCOUNTER — CLINICAL SUPPORT (OUTPATIENT)
Dept: INFECTIOUS DISEASES | Facility: CLINIC | Age: 73
End: 2023-09-21
Payer: MEDICARE

## 2023-09-21 ENCOUNTER — OFFICE VISIT (OUTPATIENT)
Dept: INFECTIOUS DISEASES | Facility: CLINIC | Age: 73
End: 2023-09-21

## 2023-09-21 ENCOUNTER — PATIENT MESSAGE (OUTPATIENT)
Dept: CARDIOLOGY | Facility: CLINIC | Age: 73
End: 2023-09-21
Payer: MEDICARE

## 2023-09-21 VITALS
WEIGHT: 213.19 LBS | HEIGHT: 66 IN | DIASTOLIC BLOOD PRESSURE: 80 MMHG | HEART RATE: 74 BPM | TEMPERATURE: 99 F | BODY MASS INDEX: 34.26 KG/M2 | SYSTOLIC BLOOD PRESSURE: 122 MMHG

## 2023-09-21 DIAGNOSIS — Z71.84 TRAVEL ADVICE ENCOUNTER: Primary | ICD-10-CM

## 2023-09-21 DIAGNOSIS — Z00.00 PREVENTATIVE HEALTH CARE: Primary | ICD-10-CM

## 2023-09-21 PROCEDURE — 99402 PR PREVENT COUNSEL,INDIV,30 MIN: ICD-10-PCS | Mod: S$GLB,,, | Performed by: INTERNAL MEDICINE

## 2023-09-21 PROCEDURE — 99999 PR PBB SHADOW E&M-EST. PATIENT-LVL III: CPT | Mod: PBBFAC,,, | Performed by: INTERNAL MEDICINE

## 2023-09-21 PROCEDURE — 90691 TYPHOID VACCINE IM: CPT | Mod: S$GLB,,, | Performed by: INTERNAL MEDICINE

## 2023-09-21 PROCEDURE — 90472 IMMUNIZATION ADMIN EACH ADD: CPT | Mod: S$GLB,,, | Performed by: INTERNAL MEDICINE

## 2023-09-21 PROCEDURE — 99402 PREV MED CNSL INDIV APPRX 30: CPT | Mod: S$GLB,,, | Performed by: INTERNAL MEDICINE

## 2023-09-21 PROCEDURE — 90691 TYPHOID VICPS VACCINE IM: ICD-10-PCS | Mod: S$GLB,,, | Performed by: INTERNAL MEDICINE

## 2023-09-21 PROCEDURE — 90632 HEPA VACCINE ADULT IM: CPT | Mod: S$GLB,,, | Performed by: INTERNAL MEDICINE

## 2023-09-21 PROCEDURE — G0008 TYPHOID VICPS VACCINE IM: ICD-10-PCS | Mod: 59,S$GLB,, | Performed by: INTERNAL MEDICINE

## 2023-09-21 PROCEDURE — 99999 PR PBB SHADOW E&M-EST. PATIENT-LVL III: ICD-10-PCS | Mod: PBBFAC,,, | Performed by: INTERNAL MEDICINE

## 2023-09-21 PROCEDURE — G0008 ADMIN INFLUENZA VIRUS VAC: HCPCS | Mod: 59,S$GLB,, | Performed by: INTERNAL MEDICINE

## 2023-09-21 PROCEDURE — 90694 FLU VACCINE - QUADRIVALENT - ADJUVANTED: ICD-10-PCS | Mod: S$GLB,,, | Performed by: INTERNAL MEDICINE

## 2023-09-21 PROCEDURE — 90632 HEPATITIS A VACCINE ADULT IM: ICD-10-PCS | Mod: S$GLB,,, | Performed by: INTERNAL MEDICINE

## 2023-09-21 PROCEDURE — 90472 FLU VACCINE - QUADRIVALENT - ADJUVANTED: ICD-10-PCS | Mod: S$GLB,,, | Performed by: INTERNAL MEDICINE

## 2023-09-21 PROCEDURE — 90471 PR IMMUNIZ ADMIN,1 SINGLE/COMB VAC/TOXOID: ICD-10-PCS | Mod: S$GLB,,, | Performed by: INTERNAL MEDICINE

## 2023-09-21 PROCEDURE — 90694 VACC AIIV4 NO PRSRV 0.5ML IM: CPT | Mod: S$GLB,,, | Performed by: INTERNAL MEDICINE

## 2023-09-21 PROCEDURE — 90471 IMMUNIZATION ADMIN: CPT | Mod: S$GLB,,, | Performed by: INTERNAL MEDICINE

## 2023-09-21 RX ORDER — AZITHROMYCIN 500 MG/1
500 TABLET, FILM COATED ORAL DAILY
Qty: 4 TABLET | Refills: 0 | Status: SHIPPED | OUTPATIENT
Start: 2023-09-21 | End: 2023-09-25

## 2023-09-21 NOTE — PROGRESS NOTES
As Dr. Romano requested me to do is to inform Mrs. Currie about her test results. I messaged her stating that her EKG sows atrial fibrillation. Now waiting for her response.

## 2023-09-21 NOTE — PROGRESS NOTES
Patient received the Typhoid and the Hep A #1 vaccines in the left deltoid, and the HD flu vaccine in the right deltoid. Pt tolerated well. Pt asked to wait in the clinic 15 minutes after injection in the event of an allergic reaction. Pt verbalized understanding. Pt left in NAD.

## 2023-09-21 NOTE — PROGRESS NOTES
INFECTIOUS DISEASE TRAVEL CLINIC  09/21/2023 10:28 AM    Subjective:      Chief Complaint:   Chief Complaint   Patient presents with    Travel Consult       History of Present Illness    Patient Rachelle Currie is a 73 y.o. female who presents today for routine pretravel consultation.  The patient reports a past medical history of afib, HLD, HTN and obesity.   The patient will be traveling to [facility], boards a cruise there. Arrive in [place] on 10/11. Will be spending a day in [place]. Will be traveling with [facility]. The purpose of this trip is pleasure.  Will be at the destination for about 10 days. The patient will be lodging at hotels and cruise ship.      The patient has travelled to the following other countries in the past; [place], [place].  The patient reports that they have all their childhood vaccinations.  The patient reports receipt of the following travel related vaccinations; na.      Have you ever received:  YES NO DATES  Routine Childhood vaccines  [x]         []       Influenza vaccine   []         [x]     Prevnar    [x]         []     Pneumovax    [x]         []     Tetanus-diptheria -pertussis  [x]         []     Hepatitis A vaccine series       []         [x]     Hepatitis B vaccine series         []         [x]     Meningitis vaccine   []         [x]     Zoster vaccine    [x]         []    Typhoid vaccine   []         [x]   Yellow fever vaccine   []         [x]   Japanese encephalitis vaccine []         [x]   Rabies vaccine   []         [x]     PMHx: afib, hld, hfpef  Med: rivaroxaban, metoprolol, rosuvastatin, losartan, jardiacne  Allergies:    Review of Symptoms:  Constitutional: Denies fevers, chills, or weakness.  Cardiovascular: Denies chest pain, palpitations  Respiratory: Denies shortness of breath, cough, hemoptysis  GI: Denies nausea/vomitting,, abd pain  : Denies dysuria  Musculoskeletal: Denies joint pain or myalgias.  Skin/breast: Denies rashes, lumps, lesions,  or discharge.  Neurologic: Denies headache     Past Medical History:   Diagnosis Date    Atrial fibrillation     Hypertension        Past Surgical History:   Procedure Laterality Date    CHOLECYSTECTOMY      LAPAROSCOPIC GASTRIC BANDING      port subsequently removed and now no longer functional       Family History   Problem Relation Age of Onset    Heart disease Mother         MI    Cancer Mother 84        colon ca    Asthma Mother     Cancer Father         lung ca--smoker    Hypertension Father     Multiple sclerosis Daughter        Social History     Socioeconomic History    Marital status:    Tobacco Use    Smoking status: Never    Smokeless tobacco: Never   Substance and Sexual Activity    Alcohol use: Not Currently    Drug use: No    Sexual activity: Yes     Partners: Male   Social History Narrative    From [place]     Moved in [facility] in [address]    Living [facility]     3 kids        Review of patient's allergies indicates:  No Known Allergies      Objective:   VS (24h):   Vitals:    09/21/23 1019   BP: 122/80   Pulse: 74   Temp: 98.8 °F (37.1 °C)     General: Afebrile, alert, comfortable, no acute distress.   HEENT: SIOBHAN. EOMI, no scleral icterus.   Pulmonary: Non labored  Extremities: Moves all extremities x 4.   Skin: No jaundice, rashes, or visible lesions.   Neurological:  Alert and oriented x 4.     Glucose   Date Value Ref Range Status   06/15/2023 89 70 - 110 mg/dL Final   01/10/2023 91 70 - 110 mg/dL Final   12/27/2022 94 70 - 110 mg/dL Final       Calcium   Date Value Ref Range Status   06/15/2023 9.4 8.7 - 10.5 mg/dL Final   01/10/2023 10.0 8.7 - 10.5 mg/dL Final   12/27/2022 9.4 8.7 - 10.5 mg/dL Final       Albumin   Date Value Ref Range Status   06/15/2023 3.5 3.5 - 5.2 g/dL Final   12/27/2022 3.7 3.5 - 5.2 g/dL Final   09/05/2021 2.7 (L) 3.5 - 5.2 g/dL Final       Total Protein   Date Value Ref Range Status   06/15/2023 7.4 6.0 - 8.4 g/dL Final   12/27/2022 7.6 6.0 - 8.4 g/dL Final    09/05/2021 7.4 6.0 - 8.4 g/dL Final       Sodium   Date Value Ref Range Status   06/15/2023 139 136 - 145 mmol/L Final   01/10/2023 135 (L) 136 - 145 mmol/L Final   12/27/2022 138 136 - 145 mmol/L Final       Potassium   Date Value Ref Range Status   06/15/2023 4.7 3.5 - 5.1 mmol/L Final   01/10/2023 4.8 3.5 - 5.1 mmol/L Final   12/27/2022 4.8 3.5 - 5.1 mmol/L Final       CO2   Date Value Ref Range Status   06/15/2023 23 23 - 29 mmol/L Final   01/10/2023 23 23 - 29 mmol/L Final   12/27/2022 24 23 - 29 mmol/L Final       Chloride   Date Value Ref Range Status   06/15/2023 108 95 - 110 mmol/L Final   01/10/2023 104 95 - 110 mmol/L Final   12/27/2022 105 95 - 110 mmol/L Final       BUN   Date Value Ref Range Status   06/15/2023 14 8 - 23 mg/dL Final   01/10/2023 14 8 - 23 mg/dL Final   12/27/2022 15 8 - 23 mg/dL Final       Creatinine   Date Value Ref Range Status   06/15/2023 0.9 0.5 - 1.4 mg/dL Final   01/10/2023 0.8 0.5 - 1.4 mg/dL Final   12/27/2022 0.9 0.5 - 1.4 mg/dL Final       Alkaline Phosphatase   Date Value Ref Range Status   06/15/2023 85 55 - 135 U/L Final   12/27/2022 93 55 - 135 U/L Final   09/05/2021 170 (H) 55 - 135 U/L Final       ALT   Date Value Ref Range Status   06/15/2023 25 10 - 44 U/L Final   12/27/2022 32 10 - 44 U/L Final   09/05/2021 114 (H) 10 - 44 U/L Final       AST   Date Value Ref Range Status   06/15/2023 23 10 - 40 U/L Final   12/27/2022 26 10 - 40 U/L Final   09/05/2021 54 (H) 10 - 40 U/L Final       Total Bilirubin   Date Value Ref Range Status   06/15/2023 0.5 0.1 - 1.0 mg/dL Final     Comment:     For infants and newborns, interpretation of results should be based  on gestational age, weight and in agreement with clinical  observations.    Premature Infant recommended reference ranges:  Up to 24 hours.............<8.0 mg/dL  Up to 48 hours............<12.0 mg/dL  3-5 days..................<15.0 mg/dL  6-29 days.................<15.0 mg/dL     12/27/2022 0.6 0.1 - 1.0 mg/dL Final      Comment:     For infants and newborns, interpretation of results should be based  on gestational age, weight and in agreement with clinical  observations.    Premature Infant recommended reference ranges:  Up to 24 hours.............<8.0 mg/dL  Up to 48 hours............<12.0 mg/dL  3-5 days..................<15.0 mg/dL  6-29 days.................<15.0 mg/dL     09/05/2021 0.5 0.1 - 1.0 mg/dL Final     Comment:     For infants and newborns, interpretation of results should be based  on gestational age, weight and in agreement with clinical  observations.    Premature Infant recommended reference ranges:  Up to 24 hours.............<8.0 mg/dL  Up to 48 hours............<12.0 mg/dL  3-5 days..................<15.0 mg/dL  6-29 days.................<15.0 mg/dL         WBC   Date Value Ref Range Status   06/15/2023 5.76 3.90 - 12.70 K/uL Final   09/05/2021 11.73 3.90 - 12.70 K/uL Final   01/11/2018 5.30 3.90 - 12.70 K/uL Final       Hemoglobin   Date Value Ref Range Status   06/15/2023 14.8 12.0 - 16.0 g/dL Final   09/05/2021 14.1 12.0 - 16.0 g/dL Final   01/11/2018 13.8 12.0 - 16.0 g/dL Final       POC Hematocrit   Date Value Ref Range Status   09/05/2021 43 36 - 54 %PCV Final     Hematocrit   Date Value Ref Range Status   06/15/2023 46.8 37.0 - 48.5 % Final   09/05/2021 41.3 37.0 - 48.5 % Final   01/11/2018 41.8 37.0 - 48.5 % Final       MCV   Date Value Ref Range Status   06/15/2023 96 82 - 98 fL Final   09/05/2021 90 82 - 98 fL Final   01/11/2018 94 82 - 98 fL Final       Platelets   Date Value Ref Range Status   06/15/2023 267 150 - 450 K/uL Final   09/05/2021 316 150 - 450 K/uL Final   01/11/2018 255 150 - 350 K/uL Final       Lab Results   Component Value Date    CHOL 153 06/15/2023    CHOL 131 05/19/2023    CHOL 210 (H) 01/11/2018       Lab Results   Component Value Date    HDL 56 06/15/2023    HDL 50 05/19/2023    HDL 69 01/11/2018       Lab Results   Component Value Date    LDLCALC 78.2 06/15/2023     "LDLCALC 62.8 (L) 05/19/2023    LDLCALC 126.4 01/11/2018       Lab Results   Component Value Date    TRIG 94 06/15/2023    TRIG 91 05/19/2023    TRIG 73 01/11/2018       Lab Results   Component Value Date    CHOLHDL 36.6 06/15/2023    CHOLHDL 38.2 05/19/2023    CHOLHDL 32.9 01/11/2018       HIV: No components found for: "HIV 1/2 AG/AB"  Hepatitis C IgG: No components found for: "HEPATITIS C"  Syphilis: No results found for: "RPR"    Hepatitis A IgG: No components found for: "HEPATITIS A IGG"  Hepatitis Bc IgG: No components found for: "HEPATITIS B CORE IGG"  Hepatitis Bs IgG:  Quantiferon: No results found for: "QUANTIFERON"        Assessment:     Pre-Travel clinic assessment  Vaccine counseling    Plan:     Patient specific risks:      The patient was provided with an extensive travel guidance packet which provides travel information specific to the patients itinerary.     The patient's medical history was reviewed and the patient was counseled on:    Precautions against development of DVT during flight.    Registering with the state department and travel insurance was recommended in case of emergency.     Destination specific risks:      -Infectious Disease risks:      Mosquito Borne pathogens:  Reviewed basic mosquito avoidance precautions including wearing long sleeve clothing and insect repellant.  to prevent insect-borne diseases (e.g., malaria, dengue).The patient was also instructed to purchase insect repellent containing DEET (25-35%; higher strengths last longer, but >35% will damage synthetic materials) and apply premethrin spray on clothing according to repellent label instructions.         Food Borne pathogens:  Reviewed basic hand, food and water sanitation precautions.  Patient instructed to take hand  on their trip. The patient was instructed to purchase Imodium over the counter to take in case diarrhea (without blood or fever) develops. Azithromycin was ordered for treatment if severe or " "bloody diarrhea develops and the patient was instructed on use and possible side effects.     STDs:  Risk of STDs reviewed with the patient. Discussed Personal protective measures     Rabies: Discussed the risk of rabies and precautions to prevent exposure. Patient is aware to seek prompt medical care should they be exposed.       -Environmental risks:     Personal and travel safety. Precautions to minimize risk/exposure to crime and motor vehicle accidents were reviewed with the patient.    Precautions against sun exposure.       -Vaccinations:  The patient's immunization history was reviewed and, based on the patient's itinerary, the following immunizations were ordered:  - Influenza vaccine   - Hepatitis A 0, 6 months   - Typhoid IM x1   - script provided for second dose zoster    The patient was encouraged to contact us about any problems that may develop after immunization and possible side effects were reviewed.      The patient was instructed to contact us if problems develop after travel.    > 30 min spent with patient and >50% of time spent counseling about travel    Chio Curiel MD  Infectious Diseases      Today:    - Hepatitis A #1  - flu  - Typhoid (good for 2 years)    6 months:  - Hepatitis A #2       At pharmacy:       Traveler's diarrhea  - bring imodium with you (available over the counter. If diarrhea severe, lasting longer than 4 days or blood in stool, take the antibiotic as we discussed. [ Azithromycin 1000 mg, take 2, 500mg pills once if needed for severe diarrhea]        The "Simple Solution" - Home made Oral Rehydration Salts (ORS) Recipe:  Six (6) level teaspoons of Sugar.  Half (1/2) level teaspoon of Salt.  One Litre of clean drinking or boiled water and then cooled - 5 cupfuls (each cup about 200 ml.)    To view your immunizations given in [place], register for an account at: [email or web address]      "

## 2023-10-31 ENCOUNTER — TELEPHONE (OUTPATIENT)
Dept: ENDOSCOPY | Facility: HOSPITAL | Age: 73
End: 2023-10-31
Payer: MEDICARE

## 2023-11-03 ENCOUNTER — TELEPHONE (OUTPATIENT)
Dept: ENDOSCOPY | Facility: HOSPITAL | Age: 73
End: 2023-11-03
Payer: MEDICARE

## 2023-11-03 NOTE — TELEPHONE ENCOUNTER
Pt called to schedule her colonoscopy during times when her  is in town. Unfortunately our schedule is full for [place] on those days. Pt asked about March schedule, but it has not jeffrey released yet.Pt offered 2/14 for [place], but her  is unavailable that day. Pt will call back to ask about cancellations and/or the March schedule opening.    Pt's orders are as follows:          Requested appt date: 8/15/2023 Authorizing: Jayna Mohan MD in [facility] INTERNAL MEDICINE           Expires: 2/14/2024 Priority: Routine   Diagnosis: Screen for colon cancer [Z12.11]   Order Class:           What procedure is to be performed?   Screening Colonoscopy            CPT Code:   COLON CA SCRN NOT HI RSK IND []            In which geographic region does the patient intend to have this procedure performed?   [place]

## 2023-11-03 NOTE — TELEPHONE ENCOUNTER
Called pt. To schedule Colonoscopy. No availability at present through the end of February 2024. New refendo created for pt. To get a new callback. Phone number provided for pt. To call back as well. Verbalized that she will call back.

## 2023-11-08 ENCOUNTER — TELEPHONE (OUTPATIENT)
Dept: ENDOSCOPY | Facility: HOSPITAL | Age: 73
End: 2023-11-08
Payer: MEDICARE

## 2023-11-08 ENCOUNTER — PATIENT MESSAGE (OUTPATIENT)
Dept: ENDOSCOPY | Facility: HOSPITAL | Age: 73
End: 2023-11-08
Payer: MEDICARE

## 2023-11-08 DIAGNOSIS — Z12.11 SPECIAL SCREENING FOR MALIGNANT NEOPLASMS, COLON: Primary | ICD-10-CM

## 2023-11-08 DIAGNOSIS — Z12.11 COLON CANCER SCREENING: ICD-10-CM

## 2023-11-08 RX ORDER — SODIUM, POTASSIUM,MAG SULFATES 17.5-3.13G
1 SOLUTION, RECONSTITUTED, ORAL ORAL DAILY
Qty: 1 KIT | Refills: 0 | Status: SHIPPED | OUTPATIENT
Start: 2023-11-08 | End: 2023-11-10

## 2023-11-08 NOTE — TELEPHONE ENCOUNTER
Spoke to patient to schedule procedure(s) Colonoscopy       Physician to perform procedure(s) Dr. CALLUM Rhodes  Date of Procedure (s) 3/21/24  Arrival Time 8:45 AM  Time of Procedure(s) 9:45 AM   Location of Procedure(s) [place] [address] Floor  Type of Rx Prep sent to patient: Suprep  Instructions provided to patient via [facility]    Patient was informed on the following information and verbalized understanding. Screening questionnaire reviewed with patient and complete. If procedure requires anesthesia, a responsible adult needs to be present to accompany the patient home, patient cannot drive after receiving anesthesia. Appointment details are tentative, especially check-in time. Patient will receive a prep-op call 4 days prior to confirm check-in time for procedure. If applicable the patient should contact their pharmacy to verify Rx for procedure prep is ready for pick-up. Patient was advised to call the scheduling department at [phone] if pharmacy states no Rx is available. Patient was advised to call the endoscopy scheduling department if any questions or concerns arise.      [facility] Scheduling Department

## 2023-11-09 ENCOUNTER — TELEPHONE (OUTPATIENT)
Dept: ENDOSCOPY | Facility: HOSPITAL | Age: 73
End: 2023-11-09
Payer: MEDICARE

## 2023-11-09 NOTE — TELEPHONE ENCOUNTER
----- Message from Vanessa Hdz RN sent at 11/8/2023  3:51 PM CST -----  Regarding: bt-3/21/24  The patient is currently under an internal cardiologist [facility] and requires a blood thinner Xarelto (rivaroxaban) for their upcoming scheduled Colonoscopy on 3/21/24.     Additional clearances required:  internal   cardiologist   blood thinner   Xarelto (rivaroxaban)

## 2023-11-28 DIAGNOSIS — I48.0 PAROXYSMAL A-FIB: Primary | ICD-10-CM

## 2023-11-28 NOTE — TELEPHONE ENCOUNTER
No care due was identified.  [facility] Embedded Care Due Messages. Reference number: [id number].   11/28/2023 2:18:58 PM CST

## 2023-11-28 NOTE — TELEPHONE ENCOUNTER
Pt is scheduled for February. She is requesting refill for rosuvastatin. I informed her our directions on her chart are unclear on what dosage she's taking. She is also unsure, she threw her bottle away.

## 2023-11-28 NOTE — TELEPHONE ENCOUNTER
Please reach out to her pharmacy and inquire what was the dose of her last prescription to ensure that we are sending in the correct dose - thanks!

## 2023-12-01 RX ORDER — ROSUVASTATIN CALCIUM 40 MG/1
40 TABLET, COATED ORAL NIGHTLY
Qty: 90 TABLET | Refills: 3 | Status: SHIPPED | OUTPATIENT
Start: 2023-12-01

## 2023-12-04 ENCOUNTER — PATIENT MESSAGE (OUTPATIENT)
Dept: INTERNAL MEDICINE | Facility: CLINIC | Age: 73
End: 2023-12-04
Payer: MEDICARE

## 2023-12-04 DIAGNOSIS — L60.0 INGROWN TOENAIL OF LEFT FOOT: Primary | ICD-10-CM

## 2023-12-05 NOTE — TELEPHONE ENCOUNTER
Sorry to her the news - that is quite painful - recommend that she follow up with podiatry to treat this as soon as possible - referral signed

## 2023-12-07 ENCOUNTER — OFFICE VISIT (OUTPATIENT)
Dept: BARIATRICS | Facility: CLINIC | Age: 73
End: 2023-12-07
Payer: MEDICARE

## 2023-12-07 VITALS
DIASTOLIC BLOOD PRESSURE: 96 MMHG | WEIGHT: 223.88 LBS | SYSTOLIC BLOOD PRESSURE: 136 MMHG | HEART RATE: 104 BPM | OXYGEN SATURATION: 96 % | BODY MASS INDEX: 35.98 KG/M2 | HEIGHT: 66 IN

## 2023-12-07 DIAGNOSIS — E78.2 MIXED HYPERLIPIDEMIA: ICD-10-CM

## 2023-12-07 DIAGNOSIS — R63.5 WEIGHT GAIN: ICD-10-CM

## 2023-12-07 DIAGNOSIS — I10 ESSENTIAL HYPERTENSION, BENIGN: Chronic | ICD-10-CM

## 2023-12-07 DIAGNOSIS — E66.01 CLASS 2 SEVERE OBESITY DUE TO EXCESS CALORIES WITH SERIOUS COMORBIDITY AND BODY MASS INDEX (BMI) OF 36.0 TO 36.9 IN ADULT: Primary | ICD-10-CM

## 2023-12-07 DIAGNOSIS — Z71.3 ENCOUNTER FOR WEIGHT LOSS COUNSELING: ICD-10-CM

## 2023-12-07 PROCEDURE — 1101F PR PT FALLS ASSESS DOC 0-1 FALLS W/OUT INJ PAST YR: ICD-10-PCS | Mod: CPTII,S$GLB,, | Performed by: STUDENT IN AN ORGANIZED HEALTH CARE EDUCATION/TRAINING PROGRAM

## 2023-12-07 PROCEDURE — 3044F PR MOST RECENT HEMOGLOBIN A1C LEVEL <7.0%: ICD-10-PCS | Mod: CPTII,S$GLB,, | Performed by: STUDENT IN AN ORGANIZED HEALTH CARE EDUCATION/TRAINING PROGRAM

## 2023-12-07 PROCEDURE — 1160F PR REVIEW ALL MEDS BY PRESCRIBER/CLIN PHARMACIST DOCUMENTED: ICD-10-PCS | Mod: CPTII,S$GLB,, | Performed by: STUDENT IN AN ORGANIZED HEALTH CARE EDUCATION/TRAINING PROGRAM

## 2023-12-07 PROCEDURE — 4010F ACE/ARB THERAPY RXD/TAKEN: CPT | Mod: CPTII,S$GLB,, | Performed by: STUDENT IN AN ORGANIZED HEALTH CARE EDUCATION/TRAINING PROGRAM

## 2023-12-07 PROCEDURE — 3075F SYST BP GE 130 - 139MM HG: CPT | Mod: CPTII,S$GLB,, | Performed by: STUDENT IN AN ORGANIZED HEALTH CARE EDUCATION/TRAINING PROGRAM

## 2023-12-07 PROCEDURE — 1126F AMNT PAIN NOTED NONE PRSNT: CPT | Mod: CPTII,S$GLB,, | Performed by: STUDENT IN AN ORGANIZED HEALTH CARE EDUCATION/TRAINING PROGRAM

## 2023-12-07 PROCEDURE — 1159F MED LIST DOCD IN RCRD: CPT | Mod: CPTII,S$GLB,, | Performed by: STUDENT IN AN ORGANIZED HEALTH CARE EDUCATION/TRAINING PROGRAM

## 2023-12-07 PROCEDURE — 1160F RVW MEDS BY RX/DR IN RCRD: CPT | Mod: CPTII,S$GLB,, | Performed by: STUDENT IN AN ORGANIZED HEALTH CARE EDUCATION/TRAINING PROGRAM

## 2023-12-07 PROCEDURE — 3080F DIAST BP >= 90 MM HG: CPT | Mod: CPTII,S$GLB,, | Performed by: STUDENT IN AN ORGANIZED HEALTH CARE EDUCATION/TRAINING PROGRAM

## 2023-12-07 PROCEDURE — 3008F BODY MASS INDEX DOCD: CPT | Mod: CPTII,S$GLB,, | Performed by: STUDENT IN AN ORGANIZED HEALTH CARE EDUCATION/TRAINING PROGRAM

## 2023-12-07 PROCEDURE — 1101F PT FALLS ASSESS-DOCD LE1/YR: CPT | Mod: CPTII,S$GLB,, | Performed by: STUDENT IN AN ORGANIZED HEALTH CARE EDUCATION/TRAINING PROGRAM

## 2023-12-07 PROCEDURE — 99999 PR PBB SHADOW E&M-EST. PATIENT-LVL IV: ICD-10-PCS | Mod: PBBFAC,,, | Performed by: STUDENT IN AN ORGANIZED HEALTH CARE EDUCATION/TRAINING PROGRAM

## 2023-12-07 PROCEDURE — 3288F FALL RISK ASSESSMENT DOCD: CPT | Mod: CPTII,S$GLB,, | Performed by: STUDENT IN AN ORGANIZED HEALTH CARE EDUCATION/TRAINING PROGRAM

## 2023-12-07 PROCEDURE — 99204 OFFICE O/P NEW MOD 45 MIN: CPT | Mod: S$GLB,,, | Performed by: STUDENT IN AN ORGANIZED HEALTH CARE EDUCATION/TRAINING PROGRAM

## 2023-12-07 PROCEDURE — 3288F PR FALLS RISK ASSESSMENT DOCUMENTED: ICD-10-PCS | Mod: CPTII,S$GLB,, | Performed by: STUDENT IN AN ORGANIZED HEALTH CARE EDUCATION/TRAINING PROGRAM

## 2023-12-07 PROCEDURE — 3044F HG A1C LEVEL LT 7.0%: CPT | Mod: CPTII,S$GLB,, | Performed by: STUDENT IN AN ORGANIZED HEALTH CARE EDUCATION/TRAINING PROGRAM

## 2023-12-07 PROCEDURE — 3075F PR MOST RECENT SYSTOLIC BLOOD PRESS GE 130-139MM HG: ICD-10-PCS | Mod: CPTII,S$GLB,, | Performed by: STUDENT IN AN ORGANIZED HEALTH CARE EDUCATION/TRAINING PROGRAM

## 2023-12-07 PROCEDURE — 3080F PR MOST RECENT DIASTOLIC BLOOD PRESSURE >= 90 MM HG: ICD-10-PCS | Mod: CPTII,S$GLB,, | Performed by: STUDENT IN AN ORGANIZED HEALTH CARE EDUCATION/TRAINING PROGRAM

## 2023-12-07 PROCEDURE — 99204 PR OFFICE/OUTPT VISIT, NEW, LEVL IV, 45-59 MIN: ICD-10-PCS | Mod: S$GLB,,, | Performed by: STUDENT IN AN ORGANIZED HEALTH CARE EDUCATION/TRAINING PROGRAM

## 2023-12-07 PROCEDURE — 3008F PR BODY MASS INDEX (BMI) DOCUMENTED: ICD-10-PCS | Mod: CPTII,S$GLB,, | Performed by: STUDENT IN AN ORGANIZED HEALTH CARE EDUCATION/TRAINING PROGRAM

## 2023-12-07 PROCEDURE — 99999 PR PBB SHADOW E&M-EST. PATIENT-LVL IV: CPT | Mod: PBBFAC,,, | Performed by: STUDENT IN AN ORGANIZED HEALTH CARE EDUCATION/TRAINING PROGRAM

## 2023-12-07 PROCEDURE — 4010F PR ACE/ARB THEARPY RXD/TAKEN: ICD-10-PCS | Mod: CPTII,S$GLB,, | Performed by: STUDENT IN AN ORGANIZED HEALTH CARE EDUCATION/TRAINING PROGRAM

## 2023-12-07 PROCEDURE — 1126F PR PAIN SEVERITY QUANTIFIED, NO PAIN PRESENT: ICD-10-PCS | Mod: CPTII,S$GLB,, | Performed by: STUDENT IN AN ORGANIZED HEALTH CARE EDUCATION/TRAINING PROGRAM

## 2023-12-07 PROCEDURE — 1159F PR MEDICATION LIST DOCUMENTED IN MEDICAL RECORD: ICD-10-PCS | Mod: CPTII,S$GLB,, | Performed by: STUDENT IN AN ORGANIZED HEALTH CARE EDUCATION/TRAINING PROGRAM

## 2023-12-07 RX ORDER — TIRZEPATIDE 7.5 MG/.5ML
7.5 INJECTION, SOLUTION SUBCUTANEOUS
Qty: 4 PEN | Refills: 0 | Status: SHIPPED | OUTPATIENT
Start: 2024-02-01 | End: 2024-01-16

## 2023-12-07 RX ORDER — TIRZEPATIDE 5 MG/.5ML
5 INJECTION, SOLUTION SUBCUTANEOUS
Qty: 4 PEN | Refills: 0 | Status: SHIPPED | OUTPATIENT
Start: 2024-01-04 | End: 2024-01-16

## 2023-12-07 RX ORDER — TIRZEPATIDE 2.5 MG/.5ML
2.5 INJECTION, SOLUTION SUBCUTANEOUS
Qty: 4 PEN | Refills: 0 | Status: SHIPPED | OUTPATIENT
Start: 2023-12-07 | End: 2024-01-04

## 2023-12-07 NOTE — PROGRESS NOTES
Subjective     Patient ID: Rachelle Currie is a 73 y.o. female.    Chief Complaint: Obesity and Consult    Patient presents for treatment of obesity.   Referred by cardiology  Increased joint pain    Co-morbidities   HTN  HLD  A-fib, A-flutter s/p ablation  Nonischemic cardiomyopathy     History of Weight Loss Efforts  Numerous diet programs  Most beneficial was a medically supervised program in [place] - shakes, low-carbohydrate, got down to 160 lbs    Current Physical Activity  No regular exercise routine  Sedentary     Current Eating Habits  Breakfast - eggs, turcios, sausage, grits; cottage cheese and fruit; leftovers  Lunch - sandwich   Dinner - sometimes cooks, sometimes eats out; [place], chicken, pasta, vegetables  Snacks - Trisquits and cheese  Beverages - unsweet tea, diet soft drink    Medical Weight Loss  12/7/2023: 223.9 lbs, BMI 36.2, BFP 46.7%, .6 lbs, SMM 65.7 lbs, BMR 1539 kcal      Review of Systems   Constitutional:  Negative for chills and fever.   Respiratory:  Negative for shortness of breath.    Cardiovascular:  Negative for chest pain.   Gastrointestinal:  Negative for abdominal pain, nausea and vomiting.   Musculoskeletal:  Positive for arthralgias.   Neurological:  Negative for dizziness and light-headedness.          Objective    Latest Reference Range & Units 06/15/23 10:08   WBC 3.90 - 12.70 K/uL 5.76   RBC 4.00 - 5.40 M/uL 4.87   Hemoglobin 12.0 - 16.0 g/dL 14.8   Hematocrit 37.0 - 48.5 % 46.8   MCV 82 - 98 fL 96   MCH 27.0 - 31.0 pg 30.4   MCHC 32.0 - 36.0 g/dL 31.6 (L)   RDW 11.5 - 14.5 % 15.5 (H)   Platelet Count 150 - 450 K/uL 267   MPV 9.2 - 12.9 fL 10.6   Gran % 38.0 - 73.0 % 54.1   Lymph % 18.0 - 48.0 % 34.4   Mono % 4.0 - 15.0 % 8.0   Eosinophil % 0.0 - 8.0 % 2.6   Basophil % 0.0 - 1.9 % 0.7   Immature Granulocytes 0.0 - 0.5 % 0.2   Gran # (ANC) 1.8 - 7.7 K/uL 3.1   Lymph # 1.0 - 4.8 K/uL 2.0   Mono # 0.3 - 1.0 K/uL 0.5   Eos # 0.0 - 0.5 K/uL 0.2   Baso # 0.00 - 0.20 K/uL 0.04    Immature Grans (Abs) 0.00 - 0.04 K/uL 0.01   nRBC 0 /100 WBC 0   Differential Method  Automated   Sodium 136 - 145 mmol/L 139   Potassium 3.5 - 5.1 mmol/L 4.7   Chloride 95 - 110 mmol/L 108   CO2 23 - 29 mmol/L 23   Anion Gap 8 - 16 mmol/L 8   BUN 8 - 23 mg/dL 14   Creatinine 0.5 - 1.4 mg/dL 0.9   eGFR >60 mL/min/1.73 m^2 >60.0   Glucose 70 - 110 mg/dL 89   Calcium 8.7 - 10.5 mg/dL 9.4   ALP 55 - 135 U/L 85   PROTEIN TOTAL 6.0 - 8.4 g/dL 7.4   Albumin 3.5 - 5.2 g/dL 3.5   BILIRUBIN TOTAL 0.1 - 1.0 mg/dL 0.5   AST 10 - 40 U/L 23   ALT 10 - 44 U/L 25   Cholesterol Total 120 - 199 mg/dL 153   HDL 40 - 75 mg/dL 56   HDL/Cholesterol Ratio 20.0 - 50.0 % 36.6   Non-HDL Cholesterol mg/dL 97   Total Cholesterol/HDL Ratio 2.0 - 5.0  2.7   Triglycerides 30 - 150 mg/dL 94   LDL Cholesterol 63.0 - 159.0 mg/dL 78.2   Vit D, 25-Hydroxy 30 - 96 ng/mL 27 (L)   Hemoglobin A1C External 4.0 - 5.6 % 5.3   Estimated Avg Glucose 68 - 131 mg/dL 105   TSH 0.400 - 4.000 uIU/mL 1.604   (L): Data is abnormally low  (H): Data is abnormally high    Vitals:    12/07/23 1512   BP: (!) 136/96   Pulse: 104       Physical Exam  Vitals reviewed.   Constitutional:       General: She is not in acute distress.     Appearance: Normal appearance. She is obese. She is not ill-appearing, toxic-appearing or diaphoretic.   HENT:      Head: Normocephalic and atraumatic.   Cardiovascular:      Rate and Rhythm: Normal rate.   Pulmonary:      Effort: Pulmonary effort is normal. No respiratory distress.   Skin:     General: Skin is warm and dry.   Neurological:      Mental Status: She is alert and oriented to person, place, and time.            Assessment and Plan     1. Class 2 severe obesity due to excess calories with serious comorbidity and body mass index (BMI) of 36.0 to 36.9 in adult    2. Weight gain  -     Ambulatory referral/consult to Bariatric Medicine    3. Essential hypertension, benign    4. Mixed hyperlipidemia    5. Encounter for weight loss  counseling    Other orders  -     tirzepatide (MOUNJARO) 2.5 mg/0.5 mL PnIj; Inject 2.5 mg into the skin every 7 days. for 4 doses  Dispense: 4 pen ; Refill: 0  -     tirzepatide (MOUNJARO) 5 mg/0.5 mL PnIj; Inject 5 mg into the skin every 7 days. for 4 doses  Dispense: 4 pen ; Refill: 0  -     tirzepatide (MOUNJARO) 7.5 mg/0.5 mL PnIj; Inject 7.5 mg into the skin every 7 days. for 4 doses  Dispense: 4 pen ; Refill: 0        - Log all food and beverage intake with a daily calorie goal of 1200 calories per day    - Seated resistance band exercises given in AVS

## 2023-12-07 NOTE — PATIENT INSTRUCTIONS
Start Mounjaro 2.5 mg once a week for 4 weeks, then increase to 5 mg once a week for 4 weeks, then increase to 7.5 mg once a week for 4 weeks.    Decrease portions as soon as you start Mounjaro. Avoid fried, greasy, fatty foods.     Some nausea in the first 2 weeks is not unusual.     If you get pain across the upper abdomen and around to your back, please call the office.             Copyright © 2011, [facility]. For more information about The Healthy Eating Plate, please see The Nutrition Source, Department of Nutrition, [employer], www.thenutritionsource.org, and [employer], www.health.[employer].edu.      Meal Planning & Grocery Shopping    Meal planning builds the foundation for healthy eating. When you have structured ideas for healthy meals and foods available at home to prepare those meals, weight control becomes easier.  If only healthy foods are available at home, then you will be much more likely to eat healthy foods. And you will be less likely to go to a restaurant or  a fast food meal, which tend to be unhealthy and higher in calories than meals prepared at home.      Take 5-10 minutes each week to plan meals for the next 7 days.  Make a grocery list based on the meal plan.    Grocery Shopping Tips:  Shop on a full stomach.  Schedule your shopping for times when you are most motivated and able to be disciplined about your purchases. For example, after a stressful day at work it may be difficult to make the healthiest choices. Shopping at other times, such as early in the morning or after dinner, may be easier.  Focus your shopping on the outside aisles of the store, which tend to contain more fresh foods and lower calorie foods. The inside aisles tend to have more processed foods.  Stick to your list. Avoid buying unhealthy items just because they are on sale.   Compare nutrition labels to check the number of calories and percentage of  fat.      What to buy:    Vegetables  Fresh vegetables  Frozen vegetables with no sauce or added salt  Canned vegetables with no sauce or added salt    Protein  Lean meats, such as chicken and turkey  Limit red meats, such as beef to no more than 1x/week  Limit processed meats, such as cold cuts, turcios, sausage, and hot dogs. Look for brands that have no nitrites and are minimally processed. Consider turkey sausage or turkey turcios.  Fish and Shellfish  Eggs  Dried beans  Canned beans (reduced sodium)    Fat  Use healthy oils, such as olive oil or canola oil, for cooking, salad dressings, etc.  Unflavored nuts and seeds  Nut butters (no added sugar)    Dairy  Yogurt (no sugar added)  Cheese  Low-fat milk  Unsweetened nondairy milks (almond milk, soy milk, etc)    Fruit  Fresh Fruit  Frozen fruit with no added sugar  Canned fruit with no added sugar  Dried fruit with no added sugar  100% fruit juice    Whole Grains  Single ingredient grains, such as oats, quinoa, brown rice  Whole-wheat pasta  Sprouted whole-grain bread    What to avoid:  - Avoid fried foods  - Avoid foods with added sugar  - Avoid sugar-sweetened beverages  - Avoid ultra-processed foods    Snacks: (100-200 calories; >5g protein)     - 1 low-fat cheese stick with 8 cherry tomatoes or 1 serving fresh fruit  - 4 thin slices fat-free turkey breast and 1 slice low-fat cheese  - 4 thin slices fat-free honey ham with wedge of melon  - Boiled eggs (can buy at [employer] already boiled w/ shell removed)  - P3 packets (Protein packs w/ cheese, nuts, lean deli meat)  - MHP Fit and Lean Protein Pudding (find at [employer] - per 1 cup serving = 100 calories, 15 g protein, 0 g sugar)  - 6-8 edamame pods (equivalent to about 1/4 cup edamame without pods).   - 1/4 cup unsalted nuts with ½ cup fruit  - 6-oz container Dannon Light n Fit vanilla yogurt, topped with 1oz unsalted nuts         - apple, celery or baby carrots spread with 2 Tbsp PB2  - apple slices with 1 oz  "slice low-fat cheese  - Apple slices dipped in 2 Tbsp of PB2  - 2 Tbsp PB2 mixed in light or greek yogurt or sugar-free pudding  - celery, cucumber, bell pepper or baby carrots dipped in ¼ cup hummus bean spread   - celery, cucumber, baby carrots dipped in high protein greek yogurt (Mix 16 oz plain greek yogurt + 1 packet of hidden valley ranch dip mix)  - Bran Links Beef Steak - 14g protein! (similar to beef jerky but very lean)  - 2 wedges Laughing Cow - Light Herb & Garlic Cheese with sliced cucumber or green bell pepper  - 1/2 cup low-fat cottage cheese with ¼ cup fruit or ¼ cup salsa  - 1/2 cup low fat cottage cheese with 10-15 cherry tomatoes  - 8 oz glass of FAIRLIFE fat free milk (13 g protein)  - 8 oz glass of FAIRLIFE fat free milk + 1 packet of sugar-free hot cocoa  - Add Atkins advantage Cafe Caramel shake to decaf coffee. Serve hot or blend with ice for "frappaccino" like drink  - RTD Protein drinks: Atkins, Low Carb Slim Fast, EAS light, Muscle Milk Light, etc.  - Homemade Protein drinks: GNC Soy95, Isopure, Nectar, UNJURY, Whey Gourmet, etc. Mix 1 scoop powder with 8oz skim/1% milk or light soymilk.  - Protein bars: Atkins, EAS, Pure Protein,  Quest, Think Thin, Detour, etc. Must have 0-4 grams sugar - Read the label.     ** Be CREATIVE. You can always snack on bites of grilled chicken or tuna salad made with low fat dalton, if needed!     SEATED RESISTANCE BAND EXERCISES    If you do not have a resistance band, or do not feel comfortable using a resistance band, these exercises can also be done holding a light hand weight or water bottle.  If you are just starting to exercise, you may want to go through the motions without any weights or resistance till you become comfortable with the movements.    Do each of the movements shown 10 times (10 repetitions). You can repeat the exercises a second or  third time as well for greater benefit. The amount of tension on the resistance bands should be adjusted " so  you can complete one set of 10 repetitions with effort. Increase the tension every few weeks. Do these  exercises 2 or 3 times a week.    * If an exercise hurts your back or joints, stop doing that particular exercise, but keep doing all the others *      CHEST EXERCISE        Start Position:   Sit tall, with feet shoulder width apart and feet in front of knees.   Belly pulled in.   Place the band around your upper back, grasp band in each hand, knuckles (rings) facing front. Arms  should be bent so that knuckles are in front of elbows   Slide shoulder blades down your back and slightly together (as if making a V).   Relax your neck.    To Perform This Exercise:   Press hands forward to lengthen arms using chest muscles (not arms!).   Dont arch your back!)   Return to start position and repeat 10 times.   Breathe!        BACK EXERCISE        Start Position:   Sit tall, with feet shoulder width apart and feet in front of knees.   Belly pulled in.   Grasp band in each hand and raise overhead. Arms should be slightly wider than shoulder width and no  slack in the band.   Slide shoulder blades down your back and slightly together (as if making a V).   Relax your neck.    To Perform This Exercise:   Open arms pulling down towards chest using upper back muscles (not arms!).   Squeeze through shoulder blades at the bottom of the movement (dont arch your back!).   Return to start position and repeat 10 times.   Breathe!        SHOULDER EXERCISE        Start Position:   Sit tall, with feet shoulder width apart and feet in front of knees.   Belly pulled in.   Sit on band so that you can grasp band in one hand with tension on the band, but not so much tension that  you cannot straighten the arm. It may take a few tries to find the right amount of tension.   Slide shoulder blades down your back and slightly together (as if making a V).   Relax your neck.    To Perform This Exercise:   Press fist to the ceiling,  slightly in front of the body.   SLOWLY return to start position and repeat 10 times.   Switch sides and repeat on the other side.   Breathe!        TRICEPS EXERCISE        Start Position:   Sit tall, with feet shoulder width apart and feet in front of knees.   Belly pulled in.   Sit on one end of the band. Grasp other end of band in one hand.   Point elbow directly toward the ceiling (if this is difficult, you may support the upper arm with the opposite  hand)   Be sure there is no slack in the band in the starting position.   Slide shoulder blades down your back and slightly together (as if making a V).   Relax your neck.    To Perform This Exercise:   Extend your arm up to the ceiling, as shown.   Squeeze through shoulder blades at the bottom of the movement (dont arch your back!).   SLOWLY return to start position and repeat 10 times.   Repeat on the other side.   Breathe!        BICEP EXERCISE        Start Position:   Sit tall, with feet shoulder width apart and feet in front of knees.   Belly pulled in.   Place center of exercise band under one foot and step the end of the band under the other foot.   Grasp each end of the band with one hand. Make sure there is no slack between your foot and the hand  that holds the band.   Hold your elbows to your sides.   Pull abdominals in, lift chest, press shoulders down and back.    To Perform This Exercise:   As you curl up, keep your wrist from changing position in relation to your forearm and your arm stable  from the shoulder to the elbow.   Bend and straighten your elbow in a slow and controlled movement. Repeat this motion 10 times.   Repeat on the other side.   Breathe!

## 2023-12-08 ENCOUNTER — TELEPHONE (OUTPATIENT)
Dept: ORTHOPEDICS | Facility: CLINIC | Age: 73
End: 2023-12-08
Payer: MEDICARE

## 2023-12-08 ENCOUNTER — PATIENT MESSAGE (OUTPATIENT)
Dept: INTERNAL MEDICINE | Facility: CLINIC | Age: 73
End: 2023-12-08
Payer: MEDICARE

## 2023-12-08 DIAGNOSIS — F32.5 MAJOR DEPRESSIVE DISORDER WITH SINGLE EPISODE, IN REMISSION: ICD-10-CM

## 2023-12-08 DIAGNOSIS — Z79.01 ON CONTINUOUS ORAL ANTICOAGULATION: ICD-10-CM

## 2023-12-08 DIAGNOSIS — R73.01 IFG (IMPAIRED FASTING GLUCOSE): ICD-10-CM

## 2023-12-08 DIAGNOSIS — I50.32 CHRONIC DIASTOLIC HEART FAILURE: ICD-10-CM

## 2023-12-08 DIAGNOSIS — R53.83 FATIGUE, UNSPECIFIED TYPE: Primary | ICD-10-CM

## 2023-12-08 DIAGNOSIS — R45.84 ANHEDONIA: ICD-10-CM

## 2023-12-08 DIAGNOSIS — G47.33 OSA (OBSTRUCTIVE SLEEP APNEA): ICD-10-CM

## 2023-12-08 NOTE — TELEPHONE ENCOUNTER
Cause of fatigue is unclear as prior labs earlier in the year were all in good range.   Recommend repeat some labs and can check iron and b12 as well   Please arrange appt with me   She is also due to follow up with sleep to arrange a sleep study - untreated sleep apnea will directly cause fatigue and may be a culprit here - I signed a referral however she should reach out to her sleep provider as she may not need an appointment until the sleep study is completed

## 2023-12-11 ENCOUNTER — TELEPHONE (OUTPATIENT)
Dept: ORTHOPEDICS | Facility: CLINIC | Age: 73
End: 2023-12-11
Payer: MEDICARE

## 2023-12-18 ENCOUNTER — LAB VISIT (OUTPATIENT)
Dept: LAB | Facility: OTHER | Age: 73
End: 2023-12-18
Attending: INTERNAL MEDICINE
Payer: MEDICARE

## 2023-12-18 DIAGNOSIS — Z79.01 ON CONTINUOUS ORAL ANTICOAGULATION: ICD-10-CM

## 2023-12-18 DIAGNOSIS — F32.5 MAJOR DEPRESSIVE DISORDER WITH SINGLE EPISODE, IN REMISSION: ICD-10-CM

## 2023-12-18 DIAGNOSIS — R45.84 ANHEDONIA: ICD-10-CM

## 2023-12-18 DIAGNOSIS — R53.83 FATIGUE, UNSPECIFIED TYPE: ICD-10-CM

## 2023-12-18 DIAGNOSIS — I50.32 CHRONIC DIASTOLIC HEART FAILURE: ICD-10-CM

## 2023-12-18 LAB
ALBUMIN SERPL BCP-MCNC: 3.6 G/DL (ref 3.5–5.2)
ALP SERPL-CCNC: 85 U/L (ref 55–135)
ALT SERPL W/O P-5'-P-CCNC: 21 U/L (ref 10–44)
ANION GAP SERPL CALC-SCNC: 8 MMOL/L (ref 8–16)
AST SERPL-CCNC: 22 U/L (ref 10–40)
BASOPHILS # BLD AUTO: 0.04 K/UL (ref 0–0.2)
BASOPHILS NFR BLD: 0.7 % (ref 0–1.9)
BILIRUB SERPL-MCNC: 0.5 MG/DL (ref 0.1–1)
BUN SERPL-MCNC: 14 MG/DL (ref 8–23)
CALCIUM SERPL-MCNC: 9.2 MG/DL (ref 8.7–10.5)
CHLORIDE SERPL-SCNC: 106 MMOL/L (ref 95–110)
CO2 SERPL-SCNC: 24 MMOL/L (ref 23–29)
CREAT SERPL-MCNC: 0.9 MG/DL (ref 0.5–1.4)
DIFFERENTIAL METHOD: ABNORMAL
EOSINOPHIL # BLD AUTO: 0.1 K/UL (ref 0–0.5)
EOSINOPHIL NFR BLD: 1.8 % (ref 0–8)
ERYTHROCYTE [DISTWIDTH] IN BLOOD BY AUTOMATED COUNT: 15 % (ref 11.5–14.5)
EST. GFR  (NO RACE VARIABLE): >60 ML/MIN/1.73 M^2
FERRITIN SERPL-MCNC: 108 NG/ML (ref 20–300)
GLUCOSE SERPL-MCNC: 88 MG/DL (ref 70–110)
HCT VFR BLD AUTO: 46.4 % (ref 37–48.5)
HGB BLD-MCNC: 15.2 G/DL (ref 12–16)
IMM GRANULOCYTES # BLD AUTO: 0.01 K/UL (ref 0–0.04)
IMM GRANULOCYTES NFR BLD AUTO: 0.2 % (ref 0–0.5)
IRON SERPL-MCNC: 87 UG/DL (ref 30–160)
LYMPHOCYTES # BLD AUTO: 2.1 K/UL (ref 1–4.8)
LYMPHOCYTES NFR BLD: 34.3 % (ref 18–48)
MCH RBC QN AUTO: 30.8 PG (ref 27–31)
MCHC RBC AUTO-ENTMCNC: 32.8 G/DL (ref 32–36)
MCV RBC AUTO: 94 FL (ref 82–98)
MONOCYTES # BLD AUTO: 0.5 K/UL (ref 0.3–1)
MONOCYTES NFR BLD: 8.5 % (ref 4–15)
NEUTROPHILS # BLD AUTO: 3.4 K/UL (ref 1.8–7.7)
NEUTROPHILS NFR BLD: 54.5 % (ref 38–73)
NRBC BLD-RTO: 0 /100 WBC
PLATELET # BLD AUTO: 254 K/UL (ref 150–450)
PMV BLD AUTO: 9.6 FL (ref 9.2–12.9)
POTASSIUM SERPL-SCNC: 4.7 MMOL/L (ref 3.5–5.1)
PROT SERPL-MCNC: 7.9 G/DL (ref 6–8.4)
RBC # BLD AUTO: 4.93 M/UL (ref 4–5.4)
SATURATED IRON: 22 % (ref 20–50)
SODIUM SERPL-SCNC: 138 MMOL/L (ref 136–145)
TOTAL IRON BINDING CAPACITY: 391 UG/DL (ref 250–450)
TRANSFERRIN SERPL-MCNC: 264 MG/DL (ref 200–375)
TSH SERPL DL<=0.005 MIU/L-ACNC: 2.04 UIU/ML (ref 0.4–4)
VIT B12 SERPL-MCNC: 522 PG/ML (ref 210–950)
WBC # BLD AUTO: 6.13 K/UL (ref 3.9–12.7)

## 2023-12-18 PROCEDURE — 84466 ASSAY OF TRANSFERRIN: CPT | Performed by: INTERNAL MEDICINE

## 2023-12-18 PROCEDURE — 83540 ASSAY OF IRON: CPT | Performed by: INTERNAL MEDICINE

## 2023-12-18 PROCEDURE — 85025 COMPLETE CBC W/AUTO DIFF WBC: CPT | Performed by: INTERNAL MEDICINE

## 2023-12-18 PROCEDURE — 82607 VITAMIN B-12: CPT | Performed by: INTERNAL MEDICINE

## 2023-12-18 PROCEDURE — 36415 COLL VENOUS BLD VENIPUNCTURE: CPT | Performed by: INTERNAL MEDICINE

## 2023-12-18 PROCEDURE — 84443 ASSAY THYROID STIM HORMONE: CPT | Performed by: INTERNAL MEDICINE

## 2023-12-18 PROCEDURE — 80053 COMPREHEN METABOLIC PANEL: CPT | Performed by: INTERNAL MEDICINE

## 2023-12-18 PROCEDURE — 82728 ASSAY OF FERRITIN: CPT | Performed by: INTERNAL MEDICINE

## 2023-12-19 NOTE — TELEPHONE ENCOUNTER
Blood counts including platelets are normal.   The mildly elevated RDW is not of any clinical concern given that all other levels are in good range.

## 2023-12-20 NOTE — TELEPHONE ENCOUNTER
Nothing is needed to address the RDW level  The RDW level is an average of red blood cell size and volume - it is not clinically significant and requires no treatment

## 2023-12-26 ENCOUNTER — PATIENT MESSAGE (OUTPATIENT)
Dept: BARIATRICS | Facility: CLINIC | Age: 73
End: 2023-12-26
Payer: MEDICARE

## 2024-01-16 RX ORDER — METFORMIN HYDROCHLORIDE EXTENDED-RELEASE TABLETS 500 MG/1
500 TABLET, FILM COATED, EXTENDED RELEASE ORAL 2 TIMES DAILY WITH MEALS
Qty: 180 TABLET | Refills: 0 | Status: SHIPPED | OUTPATIENT
Start: 2024-01-16 | End: 2024-04-15

## 2024-01-19 ENCOUNTER — TELEPHONE (OUTPATIENT)
Dept: ELECTROPHYSIOLOGY | Facility: CLINIC | Age: 74
End: 2024-01-19
Payer: MEDICARE

## 2024-01-19 NOTE — TELEPHONE ENCOUNTER
Message left on vmail to return call. Aure Weller,RN    ----- Message from Pat Grove sent at 1/19/2024  2:48 PM CST -----  Regarding: Ablation Appt  Afternoon,     Pt 3rd time calling, someone was suppose to get back to her since 10 am.   She spoke with someone yesterday but didn't know who it was.   Pt would like to stay with Dr. Goldstein, requesting a callback  for an ablation.    Contact @ [phone]    Thanks

## 2024-01-30 ENCOUNTER — TELEPHONE (OUTPATIENT)
Dept: ELECTROPHYSIOLOGY | Facility: CLINIC | Age: 74
End: 2024-01-30
Payer: MEDICARE

## 2024-01-30 NOTE — TELEPHONE ENCOUNTER
----- Message from Frances Sofia sent at 1/30/2024 10:41 AM CST -----  Regarding: Procedure  Patient calling to see if she can schedule an appt for a procedure. Please call back @ [phone]. Thank you Frances

## 2024-02-21 ENCOUNTER — TELEPHONE (OUTPATIENT)
Dept: ENDOSCOPY | Facility: HOSPITAL | Age: 74
End: 2024-02-21
Payer: MEDICARE

## 2024-02-21 NOTE — TELEPHONE ENCOUNTER
----- Message from Vanessa Hdz RN sent at 11/8/2023  3:51 PM CST -----  Regarding: bt-3/21/24  The patient is currently under an internal cardiologist [facility] and requires a blood thinner Xarelto (rivaroxaban) for their upcoming scheduled Colonoscopy on 3/21/24.     Additional clearances required:  internal   cardiologist   blood thinner   Xarelto (rivaroxaban)

## 2024-02-21 NOTE — TELEPHONE ENCOUNTER
Dear Dr Goldstein,    Patient has a scheduled procedure Colonoscopy on 3/21/24 and is currently taking a blood thinner prescribed by your office. In order to ensure patient safety, we would like to confirm that the patient can place their blood thinner medication on hold for the procedure. Can he/she discontinue Xarelto (rivaroxaban) for a minimum of 2 days prior to the procedure?     Thank you for your prompt reply.    [facility] Scheduling

## 2024-03-04 DIAGNOSIS — I48.91 ATRIAL FIBRILLATION, UNSPECIFIED TYPE: Primary | ICD-10-CM

## 2024-03-04 DIAGNOSIS — I42.8 NONISCHEMIC CARDIOMYOPATHY: ICD-10-CM

## 2024-03-04 DIAGNOSIS — Z01.812 PRE-PROCEDURE LAB EXAM: ICD-10-CM

## 2024-03-04 RX ORDER — TRIAMCINOLONE ACETONIDE 5 MG/G
CREAM TOPICAL 2 TIMES DAILY
Qty: 15 G | Refills: 0 | Status: SHIPPED | OUTPATIENT
Start: 2024-03-04

## 2024-03-12 ENCOUNTER — OFFICE VISIT (OUTPATIENT)
Dept: ELECTROPHYSIOLOGY | Facility: CLINIC | Age: 74
End: 2024-03-12
Payer: MEDICARE

## 2024-03-12 DIAGNOSIS — Z98.890 S/P ABLATION OF ATRIAL FLUTTER: ICD-10-CM

## 2024-03-12 DIAGNOSIS — Z86.79 S/P ABLATION OF ATRIAL FIBRILLATION: ICD-10-CM

## 2024-03-12 DIAGNOSIS — I48.19 PERSISTENT ATRIAL FIBRILLATION: Primary | ICD-10-CM

## 2024-03-12 DIAGNOSIS — Z86.79 S/P ABLATION OF ATRIAL FLUTTER: ICD-10-CM

## 2024-03-12 DIAGNOSIS — I42.8 NONISCHEMIC CARDIOMYOPATHY: ICD-10-CM

## 2024-03-12 DIAGNOSIS — Z98.890 S/P ABLATION OF ATRIAL FIBRILLATION: ICD-10-CM

## 2024-03-12 DIAGNOSIS — I48.0 PAROXYSMAL A-FIB: ICD-10-CM

## 2024-03-12 PROCEDURE — 99215 OFFICE O/P EST HI 40 MIN: CPT | Mod: 95,,, | Performed by: INTERNAL MEDICINE

## 2024-03-12 PROCEDURE — 4010F ACE/ARB THERAPY RXD/TAKEN: CPT | Mod: CPTII,95,, | Performed by: INTERNAL MEDICINE

## 2024-03-12 NOTE — PROGRESS NOTES
Subjective:   Patient ID:  Rachelle Currie is a 74 y.o. female who presents for follow-up of No chief complaint on file.      The patient location is: Home  The chief complaint leading to consultation is: AF  Visit type: Virtual visit with synchronous audio and video  Total time spent with patient: 15  Each patient to whom he or she provides medical services by telemedicine is:  (1) informed of the relationship between the physician and patient and the respective role of any other health care provider with respect to management of the patient; and (2) notified that he or she may decline to receive medical services by telemedicine and may withdraw from such care at any time.    Notes:   74 yoF here for arrhythmia management.     1/23: She has had persistent atrial fibrillation for several years. She had cryoablation about ten years ago and another ablation (AFL?) shortly afterwards while living in [place]. She since moved to [place]. Records from clinic visits show that her ER was 30-40% in AF. She was advised to see EP. She then moved to  to be close to family. She saw cardiologists at [facility] who performed LHC that showed no obstruction. She decided to seek a second opinion for her AF care. She has mild ROCKWELL and fatigue. No syncope or near syncope. She does not know when the last CV was performed. She is on xarelto for CVA prophylaxis.     Interval history: Remains in AF. EF normal 7/23. She has concerns regarding her current medical regimen.     LHC at [facility] 2022: No obstruction    EF 30-40% Echo 11/2022    Echo 7/23:  · Atrial fibrillation observed.  · The estimated ejection fraction is 60%.  · The left ventricle is normal in size with normal systolic function.  · Normal right ventricular size with normal right ventricular systolic function.  · Severe left atrial enlargement.  · Moderate right atrial enlargement.  · The estimated PA systolic pressure is 25 mmHg.  · Normal central venous pressure (3 mmHg).    Past  Medical History:  No date: Atrial fibrillation  No date: Hypertension    Past Surgical History:  No date: CHOLECYSTECTOMY  No date: LAPAROSCOPIC GASTRIC BANDING      Comment:  port subsequently removed and now no longer functional    Social History    Socioeconomic History      Marital status:     Tobacco Use      Smoking status: Never      Smokeless tobacco: Never    Substance and Sexual Activity      Alcohol use: Not Currently      Drug use: No      Sexual activity: Yes        Partners: Male    Social History Narrative      From [place]       Moved in [facility] [place] in [address]      Living [facility]       3 kids     Review of patient's family history indicates:  Problem: Heart disease      Relation: Mother          Age of Onset: (Not Specified)          Comment: MI  Problem: Cancer      Relation: Mother          Age of Onset: 84          Comment: colon ca  Problem: Asthma      Relation: Mother          Age of Onset: (Not Specified)  Problem: Cancer      Relation: Father          Age of Onset: (Not Specified)          Comment: lung ca--smoker  Problem: Hypertension      Relation: Father          Age of Onset: (Not Specified)  Problem: Multiple sclerosis      Relation: Daughter          Age of Onset: (Not Specified)    Current Outpatient Medications:  digoxin (LANOXIN) 125 mcg tablet, Take 1 tablet by mouth once daily., Disp: , Rfl:   diltiaZEM (CARDIZEM) 90 MG tablet, Take 90 mg by mouth., Disp: , Rfl:   empagliflozin (JARDIANCE) 10 mg tablet, Take 10 mg by mouth., Disp: , Rfl:   ergocalciferol (ERGOCALCIFEROL) 50,000 unit Cap, Take by mouth., Disp: , Rfl:   losartan (COZAAR) 100 MG tablet, Take 100 mg by mouth once daily., Disp: , Rfl:   metFORMIN (FORTAMET) 500 mg 24hr tablet, Take 1 tablet (500 mg total) by mouth 2 (two) times daily with meals., Disp: 180 tablet, Rfl: 0  metoprolol succinate (TOPROL-XL) 50 MG 24 hr tablet, Take 1 tablet (50 mg total) by mouth once daily., Disp: 90 tablet, Rfl: 3  multivitamin with  minerals tablet, Take 1 tablet by mouth. Occasionally, Disp: , Rfl:   rivaroxaban (XARELTO) 20 mg Tab, Take 20 mg by mouth daily with dinner or evening meal., Disp: , Rfl:   rosuvastatin (CRESTOR) 40 MG Tab, Take 1 tablet (40 mg total) by mouth every evening., Disp: 90 tablet, Rfl: 3  spironolactone (ALDACTONE) 25 MG tablet, Take 1 tablet by mouth once daily., Disp: , Rfl:   triamcinolone acetonide 0.5% (KENALOG) 0.5 % Crea, Apply topically 2 (two) times daily., Disp: 15 g, Rfl: 0    No current facility-administered medications for this visit.          Review of Systems   Constitutional: Negative.   HENT: Negative.     Eyes: Negative.    Cardiovascular:  Positive for dyspnea on exertion and irregular heartbeat. Negative for chest pain, leg swelling, near-syncope, palpitations and syncope.   Respiratory: Negative.  Negative for shortness of breath.    Endocrine: Negative.    Hematologic/Lymphatic: Negative.    Skin: Negative.    Musculoskeletal: Negative.    Gastrointestinal: Negative.    Genitourinary: Negative.    Neurological: Negative.  Negative for dizziness and light-headedness.   Psychiatric/Behavioral: Negative.     Allergic/Immunologic: Negative.        Objective:   Physical Exam    Assessment:      1. Persistent atrial fibrillation    2. Paroxysmal A-fib    3. Nonischemic cardiomyopathy    4. S/P ablation of atrial flutter    5. S/P ablation of atrial fibrillation        Plan:   74 yoF here for arrhythmia management. She has recurrent persistent AF despite prior ablation, CV with history of arrhythmia induced CM. I recommend repeat PVI. I had extensive discussion with patient regarding risks and benefits of PVI/WACA, and the patient would like to proceed. Risks of procedure include (but are not limited to) bleeding, stroke, perforation requiring emergency draining or surgery, AV block, death, AV fistula, AE fistula, PV stenosis.    Last anti-coagulation dose night prior to procedure.  Discontinue  antiarrhytmic drugs 4 days prior to the procedure.     RF re-do PVI, PWI, CTI  Anesthesia  ANGELICA prior, cancel if NSR  [place]

## 2024-03-14 ENCOUNTER — TELEPHONE (OUTPATIENT)
Dept: ENDOSCOPY | Facility: HOSPITAL | Age: 74
End: 2024-03-14
Payer: MEDICARE

## 2024-03-15 ENCOUNTER — TELEPHONE (OUTPATIENT)
Dept: ELECTROPHYSIOLOGY | Facility: CLINIC | Age: 74
End: 2024-03-15
Payer: MEDICARE

## 2024-03-15 NOTE — TELEPHONE ENCOUNTER
Called patient to schedule PVI procedure. Patient did not answer. I left a message with my call back number.

## 2024-03-18 ENCOUNTER — TELEPHONE (OUTPATIENT)
Dept: ELECTROPHYSIOLOGY | Facility: CLINIC | Age: 74
End: 2024-03-18
Payer: MEDICARE

## 2024-03-18 NOTE — TELEPHONE ENCOUNTER
Called patient to schedule her procedure. Patient in the car driving at this time and asked that I call her back tomorrow.

## 2024-03-19 ENCOUNTER — TELEPHONE (OUTPATIENT)
Dept: ELECTROPHYSIOLOGY | Facility: CLINIC | Age: 74
End: 2024-03-19
Payer: MEDICARE

## 2024-03-19 NOTE — TELEPHONE ENCOUNTER
Called patient to schedule her procedure. Patient did not answer. I left a message with my call back number.

## 2024-03-20 ENCOUNTER — ANESTHESIA EVENT (OUTPATIENT)
Dept: ENDOSCOPY | Facility: HOSPITAL | Age: 74
End: 2024-03-20
Payer: MEDICARE

## 2024-03-20 ENCOUNTER — TELEPHONE (OUTPATIENT)
Dept: ELECTROPHYSIOLOGY | Facility: CLINIC | Age: 74
End: 2024-03-20
Payer: MEDICARE

## 2024-03-20 NOTE — TELEPHONE ENCOUNTER
Called patient and scheduled her for her PVI procedure on 6/21/24. Patient agreed to appointment date and time.

## 2024-03-20 NOTE — H&P
Short Stay Endoscopy History and Physical    PCP - Jayna Mohan MD  Referring Physician - Jayna Mohan MD  [address]  [id number]  [place] [address]    Procedure - Colonoscopy  ASA - per anesthesia  Mallampati - per anesthesia  History of Anesthesia problems - no  Family history Anesthesia problems -  no   Plan of anesthesia - General    HPI  74 y.o. female  Reason for procedure:   Colon cancer screening [Z12.11]        ROS:  Constitutional: No fevers, chills, No weight loss  CV: No chest pain  Pulm: No cough, No shortness of breath  GI: see HPI    Medical History:  has a past medical history of Atrial fibrillation and Hypertension.    Surgical History:  has a past surgical history that includes Cholecystectomy and Laparoscopic gastric banding.    Family History: family history includes Asthma in her mother; Cancer in her father; Cancer (age of onset: 84) in her mother; Heart disease in her mother; Hypertension in her father; Multiple sclerosis in her daughter..    Social History:  reports that she has never smoked. She has never used smokeless tobacco. She reports that she does not currently use alcohol. She reports that she does not use drugs.    Review of patient's allergies indicates:  No Known Allergies    Medications:   No medications prior to admission.       Physical Exam:    Vital Signs: There were no vitals filed for this visit.    General Appearance: Well appearing in no acute distress  Abdomen: Soft, non tender, non distended with normal bowel sounds, no masses    Labs:  Lab Results   Component Value Date    WBC 6.13 12/18/2023    HGB 15.2 12/18/2023    HCT 46.4 12/18/2023     12/18/2023    CHOL 153 06/15/2023    TRIG 94 06/15/2023    HDL 56 06/15/2023    ALT 21 12/18/2023    AST 22 12/18/2023     12/18/2023    K 4.7 12/18/2023     12/18/2023    CREATININE 0.9 12/18/2023    BUN 14 12/18/2023    CO2 24 12/18/2023    TSH 2.036 12/18/2023    HGBA1C 5.3  06/15/2023       I have explained the risks and benefits of this endoscopic procedure to the patient including but not limited to bleeding, inflammation, infection, perforation, and death.      Manuel Rhodes MD

## 2024-03-20 NOTE — ANESTHESIA PREPROCEDURE EVALUATION
03/20/2024  Rachelle Currie is a 74 y.o., female.  [facility]  Anesthesia Pre-Operative Evaluation       Patient Name: Rachelle Currie  YOB: 1950  MRN: [id number]  [facility]: [id number]      Code Status: No Order   Date of Procedure: 3/21/2024  Anesthesia: Choice Procedure: Procedure(s) (LRB):  COLONOSCOPY (N/A)  Pre-Operative Diagnosis: Colon cancer screening [Z12.11]  Proceduralist: Surgeon(s) and Role:     * Manuel Rhodes MD - Primary Nurse: (Unknown)      SUBJECTIVE:   Rachelle Currie is a 74 y.o. female who  has a past medical history of Atrial fibrillation and Hypertension..     she has a current medication list which includes the following long-term medication(s): losartan, metformin, metoprolol succinate, rosuvastatin, spironolactone, and triamcinolone acetonide 0.5%.     ALLERGIES:   Review of patient's allergies indicates:  No Known Allergies  LDA:          Lines/Drains/Airways       None                  Anesthesia Evaluation      Airway   Mallampati: II  TM distance: Normal  Neck ROM: Normal ROM  Dental    (+) Intact    Pulmonary    Cardiovascular   (+) hypertension  Angina: PAF.    Rate: Normal    Neuro/Psych      GI/Hepatic/Renal      Endo/Other    Abdominal                     MEDICATIONS:     Antibiotics (From admission, onward)      None          VTE Risk Mitigation (From admission, onward)      None              No current facility-administered medications for this encounter.     Current Outpatient Medications   Medication Sig Dispense Refill    digoxin (LANOXIN) 125 mcg tablet Take 1 tablet by mouth once daily.      diltiaZEM (CARDIZEM) 90 MG tablet Take 90 mg by mouth.      empagliflozin (JARDIANCE) 10 mg tablet Take 10 mg by mouth.      ergocalciferol (ERGOCALCIFEROL) 50,000 unit Cap Take by mouth.      losartan (COZAAR) 100 MG tablet Take 100 mg by mouth once  daily.      metFORMIN (FORTAMET) 500 mg 24hr tablet Take 1 tablet (500 mg total) by mouth 2 (two) times daily with meals. 180 tablet 0    metoprolol succinate (TOPROL-XL) 50 MG 24 hr tablet Take 1 tablet (50 mg total) by mouth once daily. 90 tablet 3    multivitamin with minerals tablet Take 1 tablet by mouth. Occasionally      rivaroxaban (XARELTO) 20 mg Tab Take 20 mg by mouth daily with dinner or evening meal.      rosuvastatin (CRESTOR) 40 MG Tab Take 1 tablet (40 mg total) by mouth every evening. 90 tablet 3    spironolactone (ALDACTONE) 25 MG tablet Take 1 tablet by mouth once daily.      triamcinolone acetonide 0.5% (KENALOG) 0.5 % Crea Apply topically 2 (two) times daily. 15 g 0          History:   There are no hospital problems to display for this patient.    Surgical History:    has a past surgical history that includes Cholecystectomy and Laparoscopic gastric banding.   Social History:    reports being sexually active and has had partner(s) who are male.  reports that she has never smoked. She has never used smokeless tobacco. She reports that she does not currently use alcohol. She reports that she does not use drugs.     OBJECTIVE:     Vital Signs (Most Recent):    Vital Signs Range (Last 24H):          There is no height or weight on file to calculate BMI.   Wt Readings from Last 4 Encounters:   02/23/24 100.8 kg (222 lb 3.6 oz)   12/07/23 101.6 kg (223 lb 14.4 oz)   11/03/23 96.6 kg (213 lb)   09/21/23 96.7 kg (213 lb 3 oz)       Significant Labs:  Lab Results   Component Value Date    WBC 6.13 12/18/2023    HGB 15.2 12/18/2023    HCT 46.4 12/18/2023     12/18/2023     12/18/2023    K 4.7 12/18/2023     12/18/2023    CREATININE 0.9 12/18/2023    BUN 14 12/18/2023    CO2 24 12/18/2023    GLU 88 12/18/2023    CALCIUM 9.2 12/18/2023    MG 1.9 12/27/2022    ALKPHOS 85 12/18/2023    ALT 21 12/18/2023    AST 22 12/18/2023    ALBUMIN 3.6 12/18/2023    HGBA1C 5.3 06/15/2023    CPK 59  09/05/2021    TROPONINI <0.006 09/05/2021    BNP 68 12/27/2022     No LMP recorded. Patient is postmenopausal.  No results found for this or any previous visit (from the past 72 hour(s)).    EKG:   Results for orders placed or performed in visit on 09/18/23   IN OFFICE EKG 12-LEAD (to [place])    Collection Time: 09/18/23  3:47 PM    Narrative    Test Reason : I48.0,    Vent. Rate : 098 BPM     Atrial Rate : 182 BPM     P-R Int : 000 ms          QRS Dur : 084 ms      QT Int : 360 ms       P-R-T Axes : 000 020 028 degrees     QTc Int : 459 ms    Atrial fibrillation  Nonspecific T wave abnormality  Abnormal ECG  When compared with ECG of 19-JAN-2023 12:36,  No significant change was found  Confirmed by Curly Woods MD ([id number]) on 9/18/2023 6:20:25 PM    Referred By:  MIGUEL           Confirmed By:Curly Woods MD       TTE:  Results for orders placed or performed during the hospital encounter of 07/21/23   Echo Saline Bubble? No   Result Value Ref Range    Ascending aorta 3.25 cm    STJ 2.90 cm    AV mean gradient 2 mmHg    Ao peak tu 0.96 m/s    Ao VTI 17.15 cm    IVRT 77.07 msec    IVS 0.77 0.6 - 1.1 cm    LA size 3.98 cm    Left Atrium Major Axis 6.36 cm    Left Atrium Minor Axis 6.40 cm    LVIDd 4.30 3.5 - 6.0 cm    LVIDs 3.19 2.1 - 4.0 cm    LVOT diameter 2.10 cm    LVOT peak VTI 11.96 cm    Posterior Wall 0.86 0.6 - 1.1 cm    MV Peak E Tu 0.89 m/s    PV Peak D Tu 0.48 m/s    PV Peak S Tu 0.29 m/s    RA Major Axis 6.65 cm    RA Width 4.66 cm    RVDD 2.64 cm    Sinus 2.97 cm    TAPSE 1.18 cm    TR Max Tu 2.35 m/s    LA WIDTH 4.76 cm    LV Diastolic Volume 82.90 mL    LV Systolic Volume 40.71 mL    LVOT peak tu 0.64 m/s    TDI LATERAL 0.10 m/s    TDI SEPTAL 0.08 m/s    LA volume (mod) 87.00 cm3    LV LATERAL E/E' RATIO 8.90 m/s    LV SEPTAL E/E' RATIO 11.13 m/s    FS 26 %    LA volume 102.74 cm3    LV mass 107.95 g    Left Ventricle Relative Wall Thickness 0.40 cm    AV valve area 2.41 cm2    AV Velocity Ratio 0.67  "    AV index (prosthetic) 0.70     Mean e' 0.09 m/s    Pulm vein S/D ratio 0.60     LVOT area 3.5 cm2    LVOT stroke volume 41.40 cm3    AV peak gradient 4 mmHg    E/E' ratio 9.89 m/s    Triscuspid Valve Regurgitation Peak Gradient 22 mmHg    BSA 2.13 m2    LV Systolic Volume Index 19.8 mL/m2    LV Diastolic Volume Index 40.24 mL/m2    LA Volume Index 49.9 mL/m2    LV Mass Index 52 g/m2    LA Volume Index (Mod) 42.2 mL/m2    Right Atrial Pressure (from IVC) 3 mmHg    EF 60 %    RA area 25.00 cm2    TV resting pulmonary artery pressure 25 mmHg    Narrative    · Atrial fibrillation observed.  · The estimated ejection fraction is 60%.  · The left ventricle is normal in size with normal systolic function.  · Normal right ventricular size with normal right ventricular systolic   function.  · Severe left atrial enlargement.  · Moderate right atrial enlargement.  · The estimated PA systolic pressure is 25 mmHg.  · Normal central venous pressure (3 mmHg).        EF   Date Value Ref Range Status   07/21/2023 60 % Final   02/06/2023 50 % Final      No results found for this or any previous visit.  ANGELICA:  No results found for this or any previous visit.  Stress Test:  No results found for this or any previous visit.     LHC:  No results found for this or any previous visit.     PFT:  No results found for: "FEV1", "FVC", "UCB8EAB", "TLC", "DLCO"     ASSESSMENT/PLAN:        Pre-op Assessment    I have reviewed the Patient Summary Reports.     I have reviewed the Nursing Notes. I have reviewed the NPO Status.   I have reviewed the Medications.     Review of Systems  Anesthesia Hx:  No problems with previous Anesthesia             Denies Family Hx of Anesthesia complications.    Denies Personal Hx of Anesthesia complications.                    Hematology/Oncology:  Hematology Normal   Oncology Normal                                   EENT/Dental:  EENT/Dental Normal           Cardiovascular:     Hypertension      Angina: PAF.     " hyperlipidemia    Non-ischemic cardiomyopathy                         Pulmonary:  Pulmonary Normal                       Renal/:  Renal/ Normal                 Hepatic/GI:  Hepatic/GI Normal                 Musculoskeletal:  Musculoskeletal Normal           Spine Disorders: Osteopenia.             Neurological:  Neurology Normal                                      Endocrine:  Endocrine Normal    Vitamin D deficiency      Obesity / BMI > 30  Dermatological:  Skin Normal    Psych:    depression                Physical Exam  General: Well nourished, Cooperative, Alert and Oriented    Airway:  Mallampati: II   Mouth Opening: Normal  TM Distance: Normal  Tongue: Normal  Neck ROM: Normal ROM    Dental:  Intact    Chest/Lungs:  Clear to auscultation, Normal Respiratory Rate    Heart:  Rate: Normal  Rhythm: Regular Rhythm  Sounds: Normal    Abdomen:  Normal        Anesthesia Plan  Type of Anesthesia, risks & benefits discussed:    Anesthesia Type: Gen Natural Airway  Intra-op Monitoring Plan: Standard ASA Monitors  Post Op Pain Control Plan: multimodal analgesia  Induction:  IV  Informed Consent: Informed consent signed with the Patient and all parties understand the risks and agree with anesthesia plan.  All questions answered. Patient consented to blood products? No  ASA Score: 3  Day of Surgery Review of History & Physical: H&P Update referred to the surgeon/provider.    Ready For Surgery From Anesthesia Perspective.     .

## 2024-03-21 ENCOUNTER — ANESTHESIA (OUTPATIENT)
Dept: ENDOSCOPY | Facility: HOSPITAL | Age: 74
End: 2024-03-21
Payer: MEDICARE

## 2024-03-21 ENCOUNTER — PATIENT MESSAGE (OUTPATIENT)
Dept: INTERNAL MEDICINE | Facility: CLINIC | Age: 74
End: 2024-03-21
Payer: MEDICARE

## 2024-03-21 ENCOUNTER — HOSPITAL ENCOUNTER (OUTPATIENT)
Facility: HOSPITAL | Age: 74
Discharge: HOME | End: 2024-03-21
Attending: STUDENT IN AN ORGANIZED HEALTH CARE EDUCATION/TRAINING PROGRAM | Admitting: STUDENT IN AN ORGANIZED HEALTH CARE EDUCATION/TRAINING PROGRAM
Payer: MEDICARE

## 2024-03-21 VITALS
WEIGHT: 217 LBS | SYSTOLIC BLOOD PRESSURE: 126 MMHG | HEIGHT: 67 IN | OXYGEN SATURATION: 94 % | HEART RATE: 87 BPM | BODY MASS INDEX: 34.06 KG/M2 | RESPIRATION RATE: 24 BRPM | TEMPERATURE: 97 F | DIASTOLIC BLOOD PRESSURE: 86 MMHG

## 2024-03-21 DIAGNOSIS — Z12.11 COLON CANCER SCREENING: Primary | ICD-10-CM

## 2024-03-21 DIAGNOSIS — R73.01 IFG (IMPAIRED FASTING GLUCOSE): Primary | ICD-10-CM

## 2024-03-21 LAB — POCT GLUCOSE: 116 MG/DL (ref 70–110)

## 2024-03-21 PROCEDURE — 45380 COLONOSCOPY AND BIOPSY: CPT | Mod: PT,59 | Performed by: STUDENT IN AN ORGANIZED HEALTH CARE EDUCATION/TRAINING PROGRAM

## 2024-03-21 PROCEDURE — 94761 N-INVAS EAR/PLS OXIMETRY MLT: CPT

## 2024-03-21 PROCEDURE — 88313 SPECIAL STAINS GROUP 2: CPT | Mod: 59 | Performed by: PATHOLOGY

## 2024-03-21 PROCEDURE — 25000003 PHARM REV CODE 250: Performed by: NURSE ANESTHETIST, CERTIFIED REGISTERED

## 2024-03-21 PROCEDURE — 82962 GLUCOSE BLOOD TEST: CPT | Performed by: STUDENT IN AN ORGANIZED HEALTH CARE EDUCATION/TRAINING PROGRAM

## 2024-03-21 PROCEDURE — D9220A PRA ANESTHESIA: Mod: PT,,, | Performed by: NURSE ANESTHETIST, CERTIFIED REGISTERED

## 2024-03-21 PROCEDURE — 82542 COL CHROMOTOGRAPHY QUAL/QUAN: CPT | Performed by: PATHOLOGY

## 2024-03-21 PROCEDURE — 88380 MICRODISSECTION LASER: CPT | Performed by: PATHOLOGY

## 2024-03-21 PROCEDURE — 45380 COLONOSCOPY AND BIOPSY: CPT | Mod: PT,59,, | Performed by: STUDENT IN AN ORGANIZED HEALTH CARE EDUCATION/TRAINING PROGRAM

## 2024-03-21 PROCEDURE — 45385 COLONOSCOPY W/LESION REMOVAL: CPT | Mod: PT | Performed by: STUDENT IN AN ORGANIZED HEALTH CARE EDUCATION/TRAINING PROGRAM

## 2024-03-21 PROCEDURE — 88305 TISSUE EXAM BY PATHOLOGIST: CPT | Mod: 26,,, | Performed by: PATHOLOGY

## 2024-03-21 PROCEDURE — 37000008 HC ANESTHESIA 1ST 15 MINUTES: Performed by: STUDENT IN AN ORGANIZED HEALTH CARE EDUCATION/TRAINING PROGRAM

## 2024-03-21 PROCEDURE — 37000009 HC ANESTHESIA EA ADD 15 MINS: Performed by: STUDENT IN AN ORGANIZED HEALTH CARE EDUCATION/TRAINING PROGRAM

## 2024-03-21 PROCEDURE — 88313 SPECIAL STAINS GROUP 2: CPT | Mod: 26,,, | Performed by: PATHOLOGY

## 2024-03-21 PROCEDURE — 99900035 HC TECH TIME PER 15 MIN (STAT)

## 2024-03-21 PROCEDURE — 88313 SPECIAL STAINS GROUP 2: CPT | Performed by: PATHOLOGY

## 2024-03-21 PROCEDURE — 88305 TISSUE EXAM BY PATHOLOGIST: CPT | Performed by: PATHOLOGY

## 2024-03-21 PROCEDURE — 27201012 HC FORCEPS, HOT/COLD, DISP: Performed by: STUDENT IN AN ORGANIZED HEALTH CARE EDUCATION/TRAINING PROGRAM

## 2024-03-21 PROCEDURE — 45385 COLONOSCOPY W/LESION REMOVAL: CPT | Mod: PT,,, | Performed by: STUDENT IN AN ORGANIZED HEALTH CARE EDUCATION/TRAINING PROGRAM

## 2024-03-21 RX ORDER — SODIUM CHLORIDE 9 MG/ML
INJECTION, SOLUTION INTRAVENOUS CONTINUOUS
Status: DISCONTINUED | OUTPATIENT
Start: 2024-03-21 | End: 2024-03-21 | Stop reason: HOSPADM

## 2024-03-21 RX ORDER — LIDOCAINE HYDROCHLORIDE 20 MG/ML
INJECTION INTRAVENOUS
Status: DISCONTINUED | OUTPATIENT
Start: 2024-03-21 | End: 2024-03-21

## 2024-03-21 RX ORDER — PROPOFOL 10 MG/ML
VIAL (ML) INTRAVENOUS CONTINUOUS PRN
Status: DISCONTINUED | OUTPATIENT
Start: 2024-03-21 | End: 2024-03-21

## 2024-03-21 RX ADMIN — LIDOCAINE HYDROCHLORIDE 20 MG: 20 INJECTION INTRAVENOUS at 09:03

## 2024-03-21 RX ADMIN — Medication 150 MCG/KG/MIN: at 09:03

## 2024-03-21 RX ADMIN — SODIUM CHLORIDE: 0.9 INJECTION, SOLUTION INTRAVENOUS at 09:03

## 2024-03-21 RX ADMIN — Medication 70 MG: at 09:03

## 2024-03-21 NOTE — ANESTHESIA POSTPROCEDURE EVALUATION
Anesthesia Post Evaluation    Patient: Rachelle Currie    Procedure(s) Performed: Procedure(s) (LRB):  COLONOSCOPY (N/A)    Final Anesthesia Type: general      Patient location during evaluation: GI PACU  Patient participation: Yes- Able to Participate  Level of consciousness: awake and alert, oriented and awake  Post-procedure vital signs: reviewed and stable  Pain management: adequate  Airway patency: patent    PONV status at discharge: No PONV  Anesthetic complications: no      Cardiovascular status: stable  Respiratory status: unassisted and room air  Hydration status: euvolemic  Follow-up not needed.              Vitals Value Taken Time   /86 03/21/24 1022   Temp 36.3 °C (97.3 °F) 03/21/24 0954   Pulse 88 03/21/24 1022   Resp 20 03/21/24 1022   SpO2 96 % 03/21/24 1022   Vitals shown include unvalidated device data.      Event Time   Out of Recovery 10:28:23         Pain/Kenia Score: Kenia Score: 10 (3/21/2024 10:04 AM)

## 2024-03-21 NOTE — TRANSFER OF CARE
"Anesthesia Transfer of Care Note    Patient: Rachelle Currie    Procedure(s) Performed: Procedure(s) (LRB):  COLONOSCOPY (N/A)    Patient location: PACU    Anesthesia Type: general    Transport from OR: Transported from OR on room air with adequate spontaneous ventilation    Post pain: adequate analgesia    Post assessment: no apparent anesthetic complications    Post vital signs: stable    Level of consciousness: sedated    Nausea/Vomiting: no nausea/vomiting    Complications: none    Transfer of care protocol was followed      Last vitals: Visit Vitals  /81 (BP Location: Left arm, Patient Position: Lying)   Pulse 99   Temp 36.3 °C (97.3 °F) (Tympanic)   Resp 13   Ht 5' 7" (1.702 m)   Wt 98.4 kg (217 lb)   SpO2 99%   Breastfeeding No   BMI 33.99 kg/m²     "

## 2024-03-21 NOTE — PROVATION PATIENT INSTRUCTIONS
Discharge Summary/Instructions after an Endoscopic Procedure  Patient Name: Rachelle Currie  Patient MRN: [id number]  Patient YOB: 1950 Thursday, March 21, 2024  Manuel Rhodes MD  Dear patient,  As a result of recent federal legislation (The Federal Cures Act), you may   receive lab or pathology results from your procedure in your [facility]   account before your physician is able to contact you. Your physician or   their representative will relay the results to you with their   recommendations at their soonest availability.  Thank you,  RESTRICTIONS:  During your procedure today, you received medications for sedation.  These   medications may affect your judgment, balance and coordination.  Therefore,   for 24 hours, you have the following restrictions:   - DO NOT drive a car, operate machinery, make legal/financial decisions,   sign important papers or drink alcohol.    ACTIVITY:  Today: no heavy lifting, straining or running due to procedural   sedation/anesthesia.  The following day: return to full activity including work.  DIET:  Eat and drink normally unless instructed otherwise.     TREATMENT FOR COMMON SIDE EFFECTS:  - Mild abdominal pain, nausea, belching, bloating or excessive gas:  rest,   eat lightly and use a heating pad.  - Sore Throat: treat with throat lozenges and/or gargle with warm salt   water.  - Because air was used during the procedure, expelling large amounts of air   from your rectum or belching is normal.  - If a bowel prep was taken, you may not have a bowel movement for 1-3 days.    This is normal.  SYMPTOMS TO WATCH FOR AND REPORT TO YOUR PHYSICIAN:  1. Abdominal pain or bloating, other than gas cramps.  2. Chest pain.  3. Back pain.  4. Signs of infection such as: chills or fever occurring within 24 hours   after the procedure.  5. Rectal bleeding, which would show as bright red, maroon, or black stools.   (A tablespoon of blood from the rectum is not serious, especially  if   hemorrhoids are present.)  6. Vomiting.  7. Weakness or dizziness.  GO DIRECTLY TO THE NEAREST EMERGENCY ROOM IF YOU HAVE ANY OF THE FOLLOWING:      Difficulty breathing              Chills and/or fever over 101 F   Persistent vomiting and/or vomiting blood   Severe abdominal pain   Severe chest pain   Black, tarry stools   Bleeding- more than one tablespoon   Any other symptom or condition that you feel may need urgent attention  Your doctor recommends these additional instructions:  If any biopsies were taken, your doctors clinic will contact you in 1 to 2   weeks with any results.  - Discharge patient to home (ambulatory).   - Patient has a contact number available for emergencies.  The signs and   symptoms of potential delayed complications were discussed with the   patient.  Return to normal activities tomorrow.  Written discharge   instructions were provided to the patient.   - Resume previous diet.   - Continue present medications.   - Return to primary care physician as previously scheduled.   - Repeat colonoscopy in 5 years for surveillance.  For questions, problems or results please call your physician - Manuel Rhodes MD at Work:  [phone].  [facility], EMERGENCY ROOM PHONE NUMBER: [phone]  IF A COMPLICATION OR EMERGENCY SITUATION ARISES AND YOU ARE UNABLE TO REACH   YOUR PHYSICIAN - GO DIRECTLY TO THE EMERGENCY ROOM.  Manuel Rhodes MD  3/21/2024 9:51:33 AM  This report has been verified and signed electronically.  Dear patient,  As a result of recent federal legislation (The Federal Cures Act), you may   receive lab or pathology results from your procedure in your [facility]   account before your physician is able to contact you. Your physician or   their representative will relay the results to you with their   recommendations at their soonest availability.  Thank you,  PROVATION

## 2024-03-22 NOTE — TELEPHONE ENCOUNTER
Reviewed her chart. I see a glucose level of 116.     If this was fasting (no calorie intake for 10 hours) then this is above goal of less than 100 but not terribly elevated. It is not in diabetes range.   If this was a NONfasting sugar level (meaning she consumed a sugary drink or jello for example while prepping for the colonoscopy within the 10 hours prior to having the sugar level checked) then a sugar of 116 is normal in that setting.     I signed an order for an A1c lab (average sugar over 3 month period) and a fasting sugar at her convenience in the next week. She should repeat these at the lab and fast for 10 hours prior.     Please offer her an appt with a provider with availability in 1-2 weeks if she would like to discuss further.

## 2024-03-28 ENCOUNTER — TELEPHONE (OUTPATIENT)
Dept: GASTROENTEROLOGY | Facility: CLINIC | Age: 74
End: 2024-03-28
Payer: MEDICARE

## 2024-03-28 NOTE — TELEPHONE ENCOUNTER
----- Message from Zainab Jones sent at 3/27/2024  9:53 AM CDT -----  Regarding: Results  Pt is calling to speak to someone in the office to discuss test results. Pt is asking for a return call soon. Thanks.     Who Called: PT     Test Results for:COLONOSCOPY     Best call back number:  Please call pt @[phone]

## 2024-04-04 LAB
FINAL PATHOLOGIC DIAGNOSIS: NORMAL
GROSS: NORMAL
Lab: NORMAL
MICROSCOPIC EXAM: NORMAL
SUPPLEMENTAL DIAGNOSIS: NORMAL

## 2024-05-01 ENCOUNTER — PATIENT MESSAGE (OUTPATIENT)
Dept: ELECTROPHYSIOLOGY | Facility: CLINIC | Age: 74
End: 2024-05-01
Payer: MEDICARE

## 2024-05-03 DIAGNOSIS — R53.83 FATIGUE, UNSPECIFIED TYPE: ICD-10-CM

## 2024-05-03 DIAGNOSIS — I48.0 PAROXYSMAL ATRIAL FIBRILLATION: Chronic | ICD-10-CM

## 2024-05-03 DIAGNOSIS — E78.5 HYPERLIPIDEMIA, UNSPECIFIED HYPERLIPIDEMIA TYPE: ICD-10-CM

## 2024-05-03 DIAGNOSIS — E55.9 VITAMIN D DEFICIENCY: ICD-10-CM

## 2024-05-03 DIAGNOSIS — I10 ESSENTIAL HYPERTENSION, BENIGN: ICD-10-CM

## 2024-05-03 DIAGNOSIS — Z00.00 ANNUAL PHYSICAL EXAM: Primary | ICD-10-CM

## 2024-05-03 DIAGNOSIS — R73.01 IFG (IMPAIRED FASTING GLUCOSE): ICD-10-CM

## 2024-05-03 NOTE — TELEPHONE ENCOUNTER
Who has been prescribing xarelto?  It looks like it is not in our system.  I'm willing, just want get information and clarity  Thanks  jl

## 2024-05-03 NOTE — TELEPHONE ENCOUNTER
----- Message from Rose Coyne sent at 5/3/2024  1:44 PM CDT -----  Regarding: Rx refill  Type: RX Refill Request    Who Called:     Pharmacy Name:   [id number] - [facility] [address]  [address] [address]  Phone: [phone] Fax: [phone]        Refill or New Rx:    RX Name and Strength:rivaroxaban (XARELTO) 20 mg Tab    How is the patient currently taking it? (ex. 1XDay):    Is this a 30 day or 90 day RX:    Additional Notes:

## 2024-05-08 DIAGNOSIS — I48.0 PAROXYSMAL ATRIAL FIBRILLATION: Primary | Chronic | ICD-10-CM

## 2024-05-21 ENCOUNTER — TELEPHONE (OUTPATIENT)
Dept: OPTOMETRY | Facility: CLINIC | Age: 74
End: 2024-05-21
Payer: MEDICARE

## 2024-05-23 ENCOUNTER — OFFICE VISIT (OUTPATIENT)
Dept: OPTOMETRY | Facility: CLINIC | Age: 74
End: 2024-05-23
Payer: MEDICARE

## 2024-05-23 DIAGNOSIS — H25.13 SENILE NUCLEAR CATARACT, BILATERAL: Primary | ICD-10-CM

## 2024-05-23 DIAGNOSIS — H52.203 HYPEROPIA OF BOTH EYES WITH ASTIGMATISM AND PRESBYOPIA: ICD-10-CM

## 2024-05-23 DIAGNOSIS — H52.4 HYPEROPIA OF BOTH EYES WITH ASTIGMATISM AND PRESBYOPIA: ICD-10-CM

## 2024-05-23 DIAGNOSIS — H52.03 HYPEROPIA OF BOTH EYES WITH ASTIGMATISM AND PRESBYOPIA: ICD-10-CM

## 2024-05-23 PROCEDURE — 92015 DETERMINE REFRACTIVE STATE: CPT | Mod: S$GLB,,, | Performed by: OPTOMETRIST

## 2024-05-23 PROCEDURE — 92004 COMPRE OPH EXAM NEW PT 1/>: CPT | Mod: S$GLB,,, | Performed by: OPTOMETRIST

## 2024-05-23 PROCEDURE — 4010F ACE/ARB THERAPY RXD/TAKEN: CPT | Mod: CPTII,S$GLB,, | Performed by: OPTOMETRIST

## 2024-05-23 PROCEDURE — 99999 PR PBB SHADOW E&M-EST. PATIENT-LVL III: CPT | Mod: PBBFAC,,, | Performed by: OPTOMETRIST

## 2024-05-23 PROCEDURE — 1159F MED LIST DOCD IN RCRD: CPT | Mod: CPTII,S$GLB,, | Performed by: OPTOMETRIST

## 2024-05-23 PROCEDURE — 1126F AMNT PAIN NOTED NONE PRSNT: CPT | Mod: CPTII,S$GLB,, | Performed by: OPTOMETRIST

## 2024-05-23 PROCEDURE — 3288F FALL RISK ASSESSMENT DOCD: CPT | Mod: CPTII,S$GLB,, | Performed by: OPTOMETRIST

## 2024-05-23 PROCEDURE — 1101F PT FALLS ASSESS-DOCD LE1/YR: CPT | Mod: CPTII,S$GLB,, | Performed by: OPTOMETRIST

## 2024-05-27 NOTE — PROGRESS NOTES
"HPI    Last eye exam: 2-3 years ago    74 y.o. female presents for annual eye exam. Patient states she has   trouble with near and distance vision. Wears OTC readers but they do not   help with reading. Patient complains of occasional pain behind OU. States   she sometimes feels eyes "jump quickly." Patient has floaters OU.   Complains of hazy vision OU. Has glare.     Meds:  none  Last edited by Aster Huff on 5/23/2024  1:46 PM.            Assessment /Plan     For exam results, see Encounter Report.    Senile nuclear cataract, bilateral    Hyperopia of both eyes with astigmatism and presbyopia      MONITOR. ED PT ON ALL EXAM FINDINGS  RX FINAL SPECS   MILD NS OU; UV PROTECTION; MONITOR.   RTC 1 YR//PRN FOR REE/DFE       "

## 2024-07-01 ENCOUNTER — PATIENT MESSAGE (OUTPATIENT)
Dept: ADMINISTRATIVE | Facility: HOSPITAL | Age: 74
End: 2024-07-01
Payer: MEDICARE

## 2024-07-10 ENCOUNTER — PATIENT MESSAGE (OUTPATIENT)
Dept: ELECTROPHYSIOLOGY | Facility: CLINIC | Age: 74
End: 2024-07-10
Payer: MEDICARE

## 2024-07-10 NOTE — TELEPHONE ENCOUNTER
Returned call to patient. She states she will be out of town on 8/15/24 which is the day she is scheduled for her ablation procedure. Patient wants to reschedule to November. Patient rescheduled for 11/4/24. Patient agrees with date.

## 2024-07-13 ENCOUNTER — PATIENT MESSAGE (OUTPATIENT)
Dept: INTERNAL MEDICINE | Facility: CLINIC | Age: 74
End: 2024-07-13
Payer: MEDICARE

## 2024-07-13 ENCOUNTER — NURSE TRIAGE (OUTPATIENT)
Dept: ADMINISTRATIVE | Facility: CLINIC | Age: 74
End: 2024-07-13
Payer: MEDICARE

## 2024-07-14 NOTE — TELEPHONE ENCOUNTER
Pt states that she believes she has a UTI. States that she has pain/burning when urinating and urinary frequency. Pt states that she is only urinating a few drops/teaspoon for more than 4 hours and bladder still feels full. Pt advised to go to ED per protocol. Pt does not want to go to ED- asking what can she take OTC. Explained to pt that safest advised is to go to ED due to unable to empty bladder. Pt asked will they have meds to treat her. Advised that ED provider with evaluate and treat s/s. VU. Encounter routed to provider.     Reason for Disposition   [1] Unable to urinate (or only a few drops) > 4 hours AND [2] bladder feels very full (e.g., palpable bladder or strong urge to urinate)    Additional Information   Negative: Shock suspected (e.g., cold/pale/clammy skin, too weak to stand, low BP, rapid pulse)   Negative: Sounds like a life-threatening emergency to the triager    Protocols used: Urination Pain - Female-A-AH

## 2024-08-09 ENCOUNTER — OFFICE VISIT (OUTPATIENT)
Dept: INTERNAL MEDICINE | Facility: CLINIC | Age: 74
End: 2024-08-09
Attending: INTERNAL MEDICINE
Payer: MEDICARE

## 2024-08-09 ENCOUNTER — TELEPHONE (OUTPATIENT)
Dept: ORTHOPEDICS | Facility: CLINIC | Age: 74
End: 2024-08-09
Payer: MEDICARE

## 2024-08-09 VITALS
HEIGHT: 67 IN | SYSTOLIC BLOOD PRESSURE: 124 MMHG | DIASTOLIC BLOOD PRESSURE: 86 MMHG | BODY MASS INDEX: 33.74 KG/M2 | OXYGEN SATURATION: 100 % | WEIGHT: 214.94 LBS

## 2024-08-09 DIAGNOSIS — I48.91 ATRIAL FIBRILLATION, UNSPECIFIED TYPE: ICD-10-CM

## 2024-08-09 DIAGNOSIS — N95.9 MENOPAUSAL PROBLEM: ICD-10-CM

## 2024-08-09 DIAGNOSIS — N39.0 RECURRENT UTI: ICD-10-CM

## 2024-08-09 DIAGNOSIS — Z12.31 ENCOUNTER FOR SCREENING MAMMOGRAM FOR MALIGNANT NEOPLASM OF BREAST: ICD-10-CM

## 2024-08-09 DIAGNOSIS — I10 HYPERTENSION, UNSPECIFIED TYPE: ICD-10-CM

## 2024-08-09 DIAGNOSIS — N39.0 URINARY TRACT INFECTION WITHOUT HEMATURIA, SITE UNSPECIFIED: Primary | ICD-10-CM

## 2024-08-09 DIAGNOSIS — E66.01 OBESITY, MORBID: ICD-10-CM

## 2024-08-09 DIAGNOSIS — R05.9 COUGH, UNSPECIFIED TYPE: ICD-10-CM

## 2024-08-09 PROCEDURE — 1126F AMNT PAIN NOTED NONE PRSNT: CPT | Mod: CPTII,S$GLB,, | Performed by: INTERNAL MEDICINE

## 2024-08-09 PROCEDURE — 1101F PT FALLS ASSESS-DOCD LE1/YR: CPT | Mod: CPTII,S$GLB,, | Performed by: INTERNAL MEDICINE

## 2024-08-09 PROCEDURE — 1159F MED LIST DOCD IN RCRD: CPT | Mod: CPTII,S$GLB,, | Performed by: INTERNAL MEDICINE

## 2024-08-09 PROCEDURE — 3288F FALL RISK ASSESSMENT DOCD: CPT | Mod: CPTII,S$GLB,, | Performed by: INTERNAL MEDICINE

## 2024-08-09 PROCEDURE — 3008F BODY MASS INDEX DOCD: CPT | Mod: CPTII,S$GLB,, | Performed by: INTERNAL MEDICINE

## 2024-08-09 PROCEDURE — 3074F SYST BP LT 130 MM HG: CPT | Mod: CPTII,S$GLB,, | Performed by: INTERNAL MEDICINE

## 2024-08-09 PROCEDURE — 3079F DIAST BP 80-89 MM HG: CPT | Mod: CPTII,S$GLB,, | Performed by: INTERNAL MEDICINE

## 2024-08-09 PROCEDURE — 4010F ACE/ARB THERAPY RXD/TAKEN: CPT | Mod: CPTII,S$GLB,, | Performed by: INTERNAL MEDICINE

## 2024-08-09 PROCEDURE — 99999 PR PBB SHADOW E&M-EST. PATIENT-LVL V: CPT | Mod: PBBFAC,,, | Performed by: INTERNAL MEDICINE

## 2024-08-09 PROCEDURE — 1160F RVW MEDS BY RX/DR IN RCRD: CPT | Mod: CPTII,S$GLB,, | Performed by: INTERNAL MEDICINE

## 2024-08-09 PROCEDURE — G2211 COMPLEX E/M VISIT ADD ON: HCPCS | Mod: S$GLB,,, | Performed by: INTERNAL MEDICINE

## 2024-08-09 PROCEDURE — 99215 OFFICE O/P EST HI 40 MIN: CPT | Mod: S$GLB,,, | Performed by: INTERNAL MEDICINE

## 2024-08-09 RX ORDER — ROSUVASTATIN CALCIUM 40 MG/1
40 TABLET, COATED ORAL NIGHTLY
Qty: 90 TABLET | Refills: 3 | Status: SHIPPED | OUTPATIENT
Start: 2024-08-09

## 2024-08-09 RX ORDER — BENZONATATE 200 MG/1
200 CAPSULE ORAL 3 TIMES DAILY PRN
Qty: 30 CAPSULE | Refills: 0 | Status: SHIPPED | OUTPATIENT
Start: 2024-08-09 | End: 2024-08-19

## 2024-08-09 RX ORDER — CEPHALEXIN 500 MG/1
500 CAPSULE ORAL EVERY 6 HOURS
Qty: 28 CAPSULE | Refills: 0 | Status: SHIPPED | OUTPATIENT
Start: 2024-08-09 | End: 2024-08-16

## 2024-08-09 RX ORDER — LOSARTAN POTASSIUM 100 MG/1
100 TABLET ORAL DAILY
Qty: 90 TABLET | Refills: 3 | Status: SHIPPED | OUTPATIENT
Start: 2024-08-09 | End: 2025-08-10

## 2024-08-09 RX ORDER — TIRZEPATIDE 2.5 MG/.5ML
2.5 INJECTION, SOLUTION SUBCUTANEOUS
Qty: 4 PEN | Refills: 1 | Status: SHIPPED | OUTPATIENT
Start: 2024-08-09

## 2024-08-09 NOTE — PROGRESS NOTES
"Subjective:   Patient ID: Rachelle Currie is a 74 y.o. female  Chief complaint:   Chief Complaint   Patient presents with    Annual Exam     CC: Annual exam   Cough, congestion, flemm (greenish color)  Onset: 3 days ago  UTI        HPI  Pt here for htn follow up  - est est care at [facility] 5/2023    74F with pAfib, HLD, chornic diast HF, osteopeina, obesity, vit d def, MDD:    Not on several meds on med list   Takes aldacone prn for ankle swelling   Not on jardiance and metoprolol, digoxin Prn    Recurrent utis:   Reports more episodes of uti   Drinking concentrated cranberry juice with water   Seen providers in past year 4-5 - out of town so no culture date to review   Discussed urogyn eval     Afib s/p ablation, cardiomyopathy - systolic and diastolic HF:   Followed by cards   Echo 2/2023  Taking xarelto - not on bb or digoxin   At [facility]:   Followed by cards in [place], [place] prev and then at [facility] - no obstruction  - est care with Dr. Goldstein - [facility] 3/2024  - canceled ablation for later date - to be completed this year     Cough about 1 week ago   Green mucous   No covid test   Cough is keeping her up at night   - coricidin   Daughter also with sx     OA of knee   To have TKA in future - on hold for now     Vit d def:  - c/w vit d currently 3000mg    BARRIE - due for follow up to complete sleep study   - to be rescheduled     Osteopenia:   Dexa 3/2022  Not c/w vit d suppl     HLD:  - C/w statin     HTN:  - c/w meds per med card - on arb  - controlled     Ifg:   Not on metfomrin     Obesity:   No hx of pancreatitis   No personal or fam hx of men2, mtc    Hx of cva  Ct head 2022   On statin and xarelto     HM:   Cscope utd 3/2024  Mmg due   Dexa due - penia 3/2022  Shingrix  Rsv  flu  Covid   mmg    Cards: Ida meeks  Urology: osman    Review of Systems    Objective:  Vitals:    08/09/24 1122   BP: 124/86   SpO2: 100%   Weight: 97.5 kg (214 lb 15.2 oz)   Height: 5' 7" (1.702 m)       Body mass " index is 33.67 kg/m².    Physical Exam  Vitals reviewed.   Constitutional:       Appearance: Normal appearance. She is well-developed.   HENT:      Head: Normocephalic and atraumatic.      Right Ear: Tympanic membrane, ear canal and external ear normal.      Left Ear: Tympanic membrane, ear canal and external ear normal.      Nose: Congestion and rhinorrhea present.      Mouth/Throat:      Mouth: Mucous membranes are moist.      Pharynx: Oropharynx is clear. Posterior oropharyngeal erythema present. No oropharyngeal exudate.   Eyes:      Extraocular Movements: Extraocular movements intact.      Conjunctiva/sclera: Conjunctivae normal.   Neck:      Thyroid: No thyromegaly.   Cardiovascular:      Rate and Rhythm: Normal rate and regular rhythm.      Pulses: Normal pulses.      Heart sounds: Normal heart sounds.   Pulmonary:      Effort: Pulmonary effort is normal.      Breath sounds: Normal breath sounds.   Abdominal:      General: Bowel sounds are normal.      Palpations: Abdomen is soft.   Musculoskeletal:         General: No swelling or tenderness.      Cervical back: Neck supple.   Lymphadenopathy:      Cervical: No cervical adenopathy.   Skin:     General: Skin is warm and dry.      Capillary Refill: Capillary refill takes less than 2 seconds.   Neurological:      General: No focal deficit present.      Mental Status: She is alert and oriented to person, place, and time. Mental status is at baseline.   Psychiatric:         Behavior: Behavior normal.         Thought Content: Thought content normal.       Assessment:  1. Urinary tract infection without hematuria, site unspecified    2. Recurrent UTI    3. Atrial fibrillation, unspecified type    4. Obesity, morbid    5. Menopausal problem    6. Encounter for screening mammogram for malignant neoplasm of breast    7. Hypertension, unspecified type    8. Cough, unspecified type      Plan:  Urinary tract infection without hematuria, site unspecified  -     cephALEXin  (KEFLEX) 500 MG capsule; Take 1 capsule (500 mg total) by mouth every 6 (six) hours. for 7 days  Dispense: 28 capsule; Refill: 0  Will give for travel in case needs   F/u with urogyn  If sx develop while in town will let me know and will check urine studies including cx     Recurrent UTI  -     Ambulatory referral/consult to Urogynecology; Future; Expected date: 08/16/2024  As above     Atrial fibrillation, unspecified type  -     Ambulatory referral/consult to Sleep Disorders; Future; Expected date: 08/16/2024  Stable   Cont oac and bb   F/u with sleep and cards     Obesity, morbid  -     Ambulatory referral/consult to Sleep Disorders; Future; Expected date: 08/16/2024  -     tirzepatide, weight loss, (ZEPBOUND) 2.5 mg/0.5 mL PnIj; Inject 2.5 mg into the skin every 7 days.  Dispense: 4 Pen; Refill: 1  - cont diet and exercise  - increase intensity and duration of CV exercise to continue weight loss  - goal wt loss one pound per week  - portion control, healthy choices  Start zepbound  Potential side effects of medication discussed with patient. If the patient has any issues with medication, patient  understands to let me know. Return to clinic and ER prompts given.     Menopausal problem  -     DXA Bone Density Axial Skeleton 1 or more sites; Future; Expected date: 08/09/2024    Encounter for screening mammogram for malignant neoplasm of breast  -     Mammo Digital Screening Bilat w/ Soy; Future; Expected date: 09/09/2024    Hypertension, unspecified type  -     losartan (COZAAR) 100 MG tablet; Take 1 tablet (100 mg total) by mouth once daily.  Dispense: 90 tablet; Refill: 3  -     rosuvastatin (CRESTOR) 40 MG Tab; Take 1 tablet (40 mg total) by mouth every evening.  Dispense: 90 tablet; Refill: 3  -     Ambulatory referral/consult to Cardiology; Future; Expected date: 08/16/2024  Stable   Cont med     Cough, unspecified type  -     benzonatate (TESSALON) 200 MG capsule; Take 1 capsule (200 mg total) by mouth 3  (three) times daily as needed for Cough.  Dispense: 30 capsule; Refill: 0    43 min spent in care of patient including chart review, history, orders, physical, orders and coordination of care  Visit today included increased complexity associated with the care of the episodic problem cough, bronchitis addressed and managing the longitudinal care of the patient due to the serious and/or complex managed problem(s) afib, hld, htn.      Health Maintenance   Topic Date Due    Mammogram  08/21/2024    DEXA Scan  03/10/2025    Lipid Panel  06/15/2028    Colorectal Cancer Screening  03/21/2029    TETANUS VACCINE  03/14/2032    Hepatitis C Screening  Completed    Shingles Vaccine  Completed

## 2024-08-10 ENCOUNTER — PATIENT MESSAGE (OUTPATIENT)
Dept: INTERNAL MEDICINE | Facility: CLINIC | Age: 74
End: 2024-08-10
Payer: MEDICARE

## 2024-08-10 DIAGNOSIS — R05.9 COUGH, UNSPECIFIED TYPE: ICD-10-CM

## 2024-08-10 DIAGNOSIS — J32.9 SINUSITIS, UNSPECIFIED CHRONICITY, UNSPECIFIED LOCATION: Primary | ICD-10-CM

## 2024-08-12 ENCOUNTER — PATIENT MESSAGE (OUTPATIENT)
Dept: INTERNAL MEDICINE | Facility: CLINIC | Age: 74
End: 2024-08-12
Payer: MEDICARE

## 2024-08-12 RX ORDER — PREDNISONE 20 MG/1
20 TABLET ORAL DAILY
Qty: 3 TABLET | Refills: 0 | Status: SHIPPED | OUTPATIENT
Start: 2024-08-12 | End: 2024-08-15

## 2024-08-12 RX ORDER — DOXYCYCLINE HYCLATE 100 MG
100 TABLET ORAL EVERY 12 HOURS
Qty: 14 TABLET | Refills: 0 | Status: SHIPPED | OUTPATIENT
Start: 2024-08-12 | End: 2024-08-19

## 2024-08-12 NOTE — TELEPHONE ENCOUNTER
Saw other message that she did not travel to [place] this week and agree with her staying home until she is better     I am happy to see her tomorrow to reassess her symptoms   That said, please let her know that I sent in a prescription for an antibiotic called doxycycline which is a good options for respiratory infections  I also sent in 3 days of prednisone to help with inflammation   I ordered a chest xray to evaluate her cough further     Please help arrange the chest xray today or tomorrow at her convenience and let her know about prescriptions that were sent in     Thanks!

## 2024-08-13 ENCOUNTER — TELEPHONE (OUTPATIENT)
Dept: INTERNAL MEDICINE | Facility: CLINIC | Age: 74
End: 2024-08-13
Payer: MEDICARE

## 2024-08-13 ENCOUNTER — OFFICE VISIT (OUTPATIENT)
Dept: INTERNAL MEDICINE | Facility: CLINIC | Age: 74
End: 2024-08-13
Attending: INTERNAL MEDICINE
Payer: MEDICARE

## 2024-08-13 DIAGNOSIS — J32.9 SINUSITIS, UNSPECIFIED CHRONICITY, UNSPECIFIED LOCATION: Primary | ICD-10-CM

## 2024-08-13 DIAGNOSIS — R05.9 COUGH, UNSPECIFIED TYPE: ICD-10-CM

## 2024-08-13 PROCEDURE — 99214 OFFICE O/P EST MOD 30 MIN: CPT | Mod: 95,,, | Performed by: INTERNAL MEDICINE

## 2024-08-13 PROCEDURE — 1101F PT FALLS ASSESS-DOCD LE1/YR: CPT | Mod: CPTII,95,, | Performed by: INTERNAL MEDICINE

## 2024-08-13 PROCEDURE — 3288F FALL RISK ASSESSMENT DOCD: CPT | Mod: CPTII,95,, | Performed by: INTERNAL MEDICINE

## 2024-08-13 PROCEDURE — 1159F MED LIST DOCD IN RCRD: CPT | Mod: CPTII,95,, | Performed by: INTERNAL MEDICINE

## 2024-08-13 PROCEDURE — 1160F RVW MEDS BY RX/DR IN RCRD: CPT | Mod: CPTII,95,, | Performed by: INTERNAL MEDICINE

## 2024-08-13 PROCEDURE — 4010F ACE/ARB THERAPY RXD/TAKEN: CPT | Mod: CPTII,95,, | Performed by: INTERNAL MEDICINE

## 2024-08-13 RX ORDER — FLUTICASONE PROPIONATE 50 MCG
1 SPRAY, SUSPENSION (ML) NASAL DAILY
Qty: 16 G | Refills: 2 | Status: SHIPPED | OUTPATIENT
Start: 2024-08-13 | End: 2024-09-12

## 2024-08-13 NOTE — TELEPHONE ENCOUNTER
Phoned pt back explained chest xray has been scheduled for 10/28/2024. Thought it would be sooner. Stated it was okay. Let pt know would reach out to her Provider.

## 2024-08-13 NOTE — PROGRESS NOTES
Subjective:   Patient ID: Rachelle Currie is a 74 y.o. female  Chief complaint:   Chief Complaint   Patient presents with    Medication Reaction       Cough  The current episode started in the past 7 days. The problem has been gradually worsening. The problem occurs every few minutes. The cough is Productive of sputum and productive of brown sputum. Associated symptoms include nasal congestion, postnasal drip, rhinorrhea and shortness of breath. Pertinent negatives include no chills, ear congestion, ear pain, fever, headaches or heartburn. The symptoms are aggravated by lying down.     Here for f/u from last week for cough and sinus congestion   She opted to not travel out of town given   + Sneezing and cuoghing > 10 days  Runny nose yesterday - green   No wheezing  No fevers  Sinus pressure and pain   + cough   No wheezing or sob   No fevers   + sick contact was daughter with similar sx     Review of Systems   Constitutional:  Negative for chills and fever.   HENT:  Positive for postnasal drip and rhinorrhea. Negative for ear pain.    Respiratory:  Positive for cough and shortness of breath.    Gastrointestinal:  Negative for heartburn.   Neurological:  Negative for headaches.       Objective:  There were no vitals filed for this visit.  There is no height or weight on file to calculate BMI.    Physical Exam  Constitutional:       General: She is not in acute distress.     Appearance: She is well-developed. She is not diaphoretic.   Eyes:      General:         Right eye: No discharge.         Left eye: No discharge.      Conjunctiva/sclera: Conjunctivae normal.   Pulmonary:      Effort: Pulmonary effort is normal. No respiratory distress.      Comments: Talking in complete sentences  Skin:     Findings: No erythema or rash.   Neurological:      Mental Status: She is alert and oriented to person, place, and time.   Psychiatric:         Behavior: Behavior normal.         Thought Content: Thought content normal.          Judgment: Judgment normal.         Assessment:  1. Sinusitis, unspecified chronicity, unspecified location    2. Cough, unspecified type        Plan:  1. Sinusitis, unspecified chronicity, unspecified location  -     fluticasone propionate (FLONASE) 50 mcg/actuation nasal spray; 1 spray (50 mcg total) by Each Nostril route once daily.  Dispense: 16 g; Refill: 2    2. Cough, unspecified type      Cxr - am or pm xander   [facility] location  Complete pdn   Sinusitis - will start doxy  Flonase and ns rinses bid  Rtc prompts given     Health Maintenance   Topic Date Due    Mammogram  08/21/2024    DEXA Scan  03/10/2025    Lipid Panel  06/15/2028    Colorectal Cancer Screening  03/21/2029    TETANUS VACCINE  03/14/2032    Hepatitis C Screening  Completed    Shingles Vaccine  Completed

## 2024-08-13 NOTE — TELEPHONE ENCOUNTER
----- Message from Rose Coyne sent at 8/13/2024 10:58 AM CDT -----  Regarding: Chest xray  Type: Patient Call Back    Who called:    What is the request in detail: p/t states she needs help scheduling chest xray and would like to speak to someone in office b/c she has additional questions.. Please call to assist.     Can the clinic reply by [facility]? Yes     Would the patient rather a call back or a response via [facility]?     Best call back number: [phone] (home)       Additional Information:

## 2024-08-14 ENCOUNTER — HOSPITAL ENCOUNTER (OUTPATIENT)
Dept: RADIOLOGY | Facility: OTHER | Age: 74
Discharge: HOME | End: 2024-08-14
Attending: INTERNAL MEDICINE
Payer: MEDICARE

## 2024-08-14 DIAGNOSIS — R05.9 COUGH, UNSPECIFIED TYPE: ICD-10-CM

## 2024-08-14 DIAGNOSIS — J32.9 SINUSITIS, UNSPECIFIED CHRONICITY, UNSPECIFIED LOCATION: ICD-10-CM

## 2024-08-14 PROCEDURE — 71046 X-RAY EXAM CHEST 2 VIEWS: CPT | Mod: TC,FY

## 2024-08-14 PROCEDURE — 71046 X-RAY EXAM CHEST 2 VIEWS: CPT | Mod: 26,,, | Performed by: RADIOLOGY

## 2024-08-16 ENCOUNTER — TELEPHONE (OUTPATIENT)
Dept: INTERNAL MEDICINE | Facility: CLINIC | Age: 74
End: 2024-08-16
Payer: MEDICARE

## 2024-08-16 NOTE — TELEPHONE ENCOUNTER
Spoke to Ms. Currie and informed her which medication she was taking for her UTI and Respiratory injections.  Patient states understanding.

## 2024-08-16 NOTE — TELEPHONE ENCOUNTER
----- Message from Michelle Jeff sent at 8/16/2024  8:17 AM CDT -----  Regarding: medication question  Name of caller:Rachelle       What is the requesting detail: pt is requesting a call back. States she has a question regarding her medication. Please advise       Can the clinic reply by [facility]:       What number to call back: [phone]

## 2024-08-16 NOTE — TELEPHONE ENCOUNTER
----- Message from Michelle Jeff sent at 8/16/2024  8:17 AM CDT -----  Regarding: medication question  Name of caller:Rachelle       What is the requesting detail: pt is requesting a call back. States she has a question regarding her medication. Please advise       Can the clinic reply by [facility]:       What number to call back: [phone]

## 2024-08-16 NOTE — TELEPHONE ENCOUNTER
----- Message from Michelle Jeff sent at 8/16/2024  8:17 AM CDT -----  Regarding: medication question  Name of caller:Rachelle       What is the requesting detail: pt is requesting a call back. States she has a question regarding her medication. Please advise       Can the clinic reply by [facility]:       What number to call back: [phone]

## 2024-09-08 ENCOUNTER — PATIENT MESSAGE (OUTPATIENT)
Dept: INTERNAL MEDICINE | Facility: CLINIC | Age: 74
End: 2024-09-08
Payer: MEDICARE

## 2024-09-17 ENCOUNTER — PATIENT MESSAGE (OUTPATIENT)
Dept: INTERNAL MEDICINE | Facility: CLINIC | Age: 74
End: 2024-09-17
Payer: MEDICARE

## 2024-09-17 DIAGNOSIS — E66.01 OBESITY, MORBID: Primary | ICD-10-CM

## 2024-09-18 ENCOUNTER — PATIENT MESSAGE (OUTPATIENT)
Dept: ELECTROPHYSIOLOGY | Facility: CLINIC | Age: 74
End: 2024-09-18
Payer: MEDICARE

## 2024-09-18 RX ORDER — TIRZEPATIDE 5 MG/.5ML
5 INJECTION, SOLUTION SUBCUTANEOUS
Qty: 4 PEN | Refills: 1 | Status: SHIPPED | OUTPATIENT
Start: 2024-09-18

## 2024-09-18 NOTE — TELEPHONE ENCOUNTER
Care Due:                  Date            Visit Type   Department     Provider  --------------------------------------------------------------------------------                                ESTABLISHED                              PATIENT -    [facility] INTERNAL  Last Visit: 08-      VIRTUAL      MEDICINE       Jayna Mohan                              EP -                              PRIMARY      [facility] INTERNAL  Next Visit: 11-      CARE ([facility])   MEDICINE       Jayna Mohan                                                            Last  Test          Frequency    Reason                     Performed    Due Date  --------------------------------------------------------------------------------    CMP.........  12 months..  losartan, rosuvastatin...  12- 12-    HBA1C.......  6 months...  tirzepatide,.............  06-   12-    Lipid Panel.  12 months..  rosuvastatin.............  06-   06-    Health [facility] Embedded Care Due Messages. Reference number: [id number].   9/18/2024 6:52:49 AM CDT

## 2024-09-19 ENCOUNTER — PATIENT OUTREACH (OUTPATIENT)
Dept: ADMINISTRATIVE | Facility: HOSPITAL | Age: 74
End: 2024-09-19
Payer: MEDICARE

## 2024-09-19 NOTE — PROGRESS NOTES
Health Maintenance Due   Topic Date Due    RSV Vaccine (Age 60+ and Pregnant patients) (1 - 1-dose 60+ series) Never done    Hemoglobin A1c (Prediabetes)  06/15/2024    Mammogram  08/21/2024    Influenza Vaccine (1) 09/01/2024    COVID-19 Vaccine (6 - 2023-24 season) 09/01/2024   Health Maintenance reviewed, updated and links triggered. Mammogram and A1c due, portal message sent   For scheduling. ([place]) 9/19/24

## 2024-09-23 ENCOUNTER — OFFICE VISIT (OUTPATIENT)
Dept: CARDIOLOGY | Facility: CLINIC | Age: 74
End: 2024-09-23
Attending: INTERNAL MEDICINE
Payer: MEDICARE

## 2024-09-23 VITALS
WEIGHT: 218.63 LBS | DIASTOLIC BLOOD PRESSURE: 84 MMHG | SYSTOLIC BLOOD PRESSURE: 138 MMHG | BODY MASS INDEX: 34.24 KG/M2 | HEART RATE: 58 BPM | OXYGEN SATURATION: 97 %

## 2024-09-23 DIAGNOSIS — I48.11 LONGSTANDING PERSISTENT ATRIAL FIBRILLATION: Primary | ICD-10-CM

## 2024-09-23 DIAGNOSIS — I10 PRIMARY HYPERTENSION: ICD-10-CM

## 2024-09-23 PROCEDURE — 3079F DIAST BP 80-89 MM HG: CPT | Mod: CPTII,S$GLB,, | Performed by: INTERNAL MEDICINE

## 2024-09-23 PROCEDURE — 1101F PT FALLS ASSESS-DOCD LE1/YR: CPT | Mod: CPTII,S$GLB,, | Performed by: INTERNAL MEDICINE

## 2024-09-23 PROCEDURE — 1160F RVW MEDS BY RX/DR IN RCRD: CPT | Mod: CPTII,S$GLB,, | Performed by: INTERNAL MEDICINE

## 2024-09-23 PROCEDURE — 1159F MED LIST DOCD IN RCRD: CPT | Mod: CPTII,S$GLB,, | Performed by: INTERNAL MEDICINE

## 2024-09-23 PROCEDURE — 4010F ACE/ARB THERAPY RXD/TAKEN: CPT | Mod: CPTII,S$GLB,, | Performed by: INTERNAL MEDICINE

## 2024-09-23 PROCEDURE — 3075F SYST BP GE 130 - 139MM HG: CPT | Mod: CPTII,S$GLB,, | Performed by: INTERNAL MEDICINE

## 2024-09-23 PROCEDURE — 99999 PR PBB SHADOW E&M-EST. PATIENT-LVL III: CPT | Mod: PBBFAC,,, | Performed by: INTERNAL MEDICINE

## 2024-09-23 PROCEDURE — 3288F FALL RISK ASSESSMENT DOCD: CPT | Mod: CPTII,S$GLB,, | Performed by: INTERNAL MEDICINE

## 2024-09-23 PROCEDURE — 99215 OFFICE O/P EST HI 40 MIN: CPT | Mod: S$GLB,,, | Performed by: INTERNAL MEDICINE

## 2024-09-23 PROCEDURE — 1126F AMNT PAIN NOTED NONE PRSNT: CPT | Mod: CPTII,S$GLB,, | Performed by: INTERNAL MEDICINE

## 2024-09-23 PROCEDURE — 3008F BODY MASS INDEX DOCD: CPT | Mod: CPTII,S$GLB,, | Performed by: INTERNAL MEDICINE

## 2024-09-23 NOTE — PROGRESS NOTES
[facility]    Chief Complaint  Chief Complaint   Patient presents with    Atrial Fibrillation       HPI:    Here to establish cardiac follow-up.  Has a history of longstanding atrial fibrillation.  Several ablations in the past which were unsuccessful.  Scheduled for another 1 in November.  Not very active which she attributes to knee problems.  Gets dyspneic walking up a flight of stairs.  Recently started weight loss medications.    Medications  Current Outpatient Medications   Medication Sig Dispense Refill    ergocalciferol (ERGOCALCIFEROL) 50,000 unit Cap Take by mouth.      losartan (COZAAR) 100 MG tablet Take 1 tablet (100 mg total) by mouth once daily. 90 tablet 3    multivitamin with minerals tablet Take 1 tablet by mouth. Occasionally      rivaroxaban (XARELTO) 20 mg Tab Take 1 tablet (20 mg total) by mouth daily with dinner or evening meal. 90 tablet 0    rosuvastatin (CRESTOR) 40 MG Tab Take 1 tablet (40 mg total) by mouth every evening. 90 tablet 3    spironolactone (ALDACTONE) 25 MG tablet Take 1 tablet by mouth once daily.      tirzepatide, weight loss, (ZEPBOUND) 5 mg/0.5 mL PnIj Inject 5 mg into the skin every 7 days. 4 Pen 1    triamcinolone acetonide 0.5% (KENALOG) 0.5 % Crea Apply topically 2 (two) times daily. 15 g 0    digoxin (LANOXIN) 125 mcg tablet Take 1 tablet by mouth once daily. (Patient not taking: Reported on 9/23/2024)      metoprolol succinate (TOPROL-XL) 50 MG 24 hr tablet Take 1 tablet (50 mg total) by mouth once daily. (Patient not taking: Reported on 8/9/2024) 90 tablet 3     No current facility-administered medications for this visit.        History  Past Medical History:   Diagnosis Date    Atrial fibrillation     Hypertension      Past Surgical History:   Procedure Laterality Date    CHOLECYSTECTOMY      COLONOSCOPY N/A 3/21/2024    Procedure: COLONOSCOPY;  Surgeon: Manuel Rhodes MD;  Location: [facility] ENDOSCOPY;  Service: Endoscopy;  Laterality: N/A;   suprep-xarelto hold pending-instr portal-tb  ok to hold Xarelto 2 days per Dr Goldstein/MARGARET Canela RN-GT  3/14- pc complete. DBM    LAPAROSCOPIC GASTRIC BANDING      port subsequently removed and now no longer functional     Social History     Socioeconomic History    Marital status:    Tobacco Use    Smoking status: Never    Smokeless tobacco: Never   Substance and Sexual Activity    Alcohol use: Not Currently    Drug use: No    Sexual activity: Yes     Partners: Male   Social History Narrative    From [place]     Moved in [facility] [place] in [place]    Living [facility]     3 kids      Social Determinants of Health     Financial Resource Strain: Patient Declined (8/13/2024)    Overall Financial Resource Strain (CARDIA)     Difficulty of Paying Living Expenses: Patient declined   Food Insecurity: Patient Declined (8/13/2024)    Hunger Vital Sign     Worried About Running Out of Food in the Last Year: Patient declined     Ran Out of Food in the Last Year: Patient declined   Physical Activity: Insufficiently Active (8/13/2024)    Exercise Vital Sign     Days of Exercise per Week: 1 day     Minutes of Exercise per Session: 20 min   Stress: Stress Concern Present (8/13/2024)    [employer] - Occupational Stress Questionnaire     Feeling of Stress : To some extent   Housing Stability: Unknown (8/13/2024)    Housing Stability Vital Sign     Unable to Pay for Housing in the Last Year: Patient declined     Family History   Problem Relation Name Age of Onset    Heart disease Mother          MI    Cancer Mother  84        colon ca    Asthma Mother      Cancer Father          lung ca--smoker    Hypertension Father      Multiple sclerosis Daughter Francesca         Allergies  Review of patient's allergies indicates:   Allergen Reactions    Talc Itching     Irritation around skin       Review of Systems   Review of Systems   Constitutional: Positive for malaise/fatigue. Negative for weight gain and weight loss.    Eyes:  Negative for visual disturbance.   Cardiovascular:  Negative for chest pain, claudication, cyanosis, dyspnea on exertion, irregular heartbeat, leg swelling, near-syncope, orthopnea, palpitations, paroxysmal nocturnal dyspnea and syncope.   Respiratory:  Negative for cough, hemoptysis, shortness of breath, sleep disturbances due to breathing and wheezing.    Hematologic/Lymphatic: Negative for bleeding problem. Does not bruise/bleed easily.   Skin:  Negative for poor wound healing.   Musculoskeletal:  Negative for muscle cramps and myalgias.   Gastrointestinal:  Negative for abdominal pain, anorexia, diarrhea, heartburn, hematemesis, hematochezia, melena, nausea and vomiting.   Genitourinary:  Negative for hematuria and nocturia.   Neurological:  Positive for excessive daytime sleepiness. Negative for dizziness, focal weakness, light-headedness and weakness.       Physical Exam  Vitals:    09/23/24 1402   BP: 138/84   Pulse: (!) 58     Wt Readings from Last 1 Encounters:   09/23/24 99.2 kg (218 lb 9.6 oz)     Physical Exam  Vitals and nursing note reviewed.   Constitutional:       General: She is not in acute distress.     Appearance: She is obese. She is not toxic-appearing or diaphoretic.   HENT:      Head: Normocephalic and atraumatic.      Mouth/Throat:      Lips: Pink.      Mouth: Mucous membranes are moist.   Eyes:      General: No scleral icterus.     Conjunctiva/sclera: Conjunctivae normal.   Neck:      Thyroid: No thyromegaly.      Vascular: No carotid bruit, hepatojugular reflux or JVD.      Trachea: Trachea normal.   Cardiovascular:      Rate and Rhythm: Normal rate. Rhythm irregularly irregular. No extrasystoles are present.     Chest Wall: PMI is not displaced.      Pulses:           Carotid pulses are 2+ on the right side and 2+ on the left side.       Radial pulses are 2+ on the right side and 2+ on the left side.        Dorsalis pedis pulses are 2+ on the right side and 2+ on the left side.       Heart sounds: S1 normal and S2 normal. No murmur heard.     No friction rub. No S3 or S4 sounds.   Pulmonary:      Effort: Pulmonary effort is normal. No accessory muscle usage or respiratory distress.      Breath sounds: Normal breath sounds and air entry. No decreased breath sounds, wheezing, rhonchi or rales.   Abdominal:      General: Bowel sounds are normal. There is no distension or abdominal bruit.      Palpations: Abdomen is soft. There is no hepatomegaly, splenomegaly or pulsatile mass.      Tenderness: There is no abdominal tenderness.   Musculoskeletal:         General: No tenderness or deformity.      Right lower leg: No edema.      Left lower leg: No edema.   Skin:     General: Skin is warm and dry.      Capillary Refill: Capillary refill takes less than 2 seconds.      Coloration: Skin is not cyanotic or pale.      Nails: There is no clubbing.   Neurological:      General: No focal deficit present.      Mental Status: She is alert and oriented to person, place, and time.   Psychiatric:         Attention and Perception: Attention normal.         Mood and Affect: Mood normal.         Speech: Speech normal.         Behavior: Behavior normal. Behavior is cooperative.         Labs      Imaging  No results found.    Assessment  1. Longstanding persistent atrial fibrillation  Symptomatic?  Rate controlled and tolerating anticoagulation    2. Primary hypertension  Controlled  - Ambulatory referral/consult to Cardiology      Plan and Discussion    Working on weight loss.  Nothing to add from my standpoint.    The 10-year ASCVD risk score (Kristin COOL, et al., 2019) is: 18.9%    Values used to calculate the score:      Age: 74 years      Sex: Female      Is Non- : No      Diabetic: No      Tobacco smoker: No      Systolic Blood Pressure: 138 mmHg      Is BP treated: Yes      HDL Cholesterol: 56 mg/dL      Total Cholesterol: 153 mg/dL     Follow Up  Follow up in about 1 year (around  9/23/2025).      Yao Rebollar MD

## 2024-10-07 ENCOUNTER — TELEPHONE (OUTPATIENT)
Dept: INTERNAL MEDICINE | Facility: CLINIC | Age: 74
End: 2024-10-07
Payer: MEDICARE

## 2024-10-07 ENCOUNTER — LAB VISIT (OUTPATIENT)
Dept: LAB | Facility: HOSPITAL | Age: 74
End: 2024-10-07
Payer: MEDICARE

## 2024-10-07 ENCOUNTER — OFFICE VISIT (OUTPATIENT)
Dept: INTERNAL MEDICINE | Facility: CLINIC | Age: 74
End: 2024-10-07
Payer: MEDICARE

## 2024-10-07 VITALS
HEART RATE: 69 BPM | OXYGEN SATURATION: 96 % | HEIGHT: 67 IN | BODY MASS INDEX: 33.57 KG/M2 | WEIGHT: 213.88 LBS | SYSTOLIC BLOOD PRESSURE: 134 MMHG | DIASTOLIC BLOOD PRESSURE: 78 MMHG

## 2024-10-07 DIAGNOSIS — Z23 NEED FOR INFLUENZA VACCINATION: ICD-10-CM

## 2024-10-07 DIAGNOSIS — I10 ESSENTIAL HYPERTENSION, BENIGN: Chronic | ICD-10-CM

## 2024-10-07 DIAGNOSIS — R35.0 URINARY FREQUENCY: ICD-10-CM

## 2024-10-07 DIAGNOSIS — R35.0 URINARY FREQUENCY: Primary | ICD-10-CM

## 2024-10-07 LAB
BILIRUB SERPL-MCNC: NEGATIVE MG/DL
BILIRUB UR QL STRIP: NEGATIVE
BLOOD URINE, POC: NORMAL
CLARITY UR REFRACT.AUTO: CLEAR
CLARITY, POC UA: CLEAR
COLOR UR AUTO: YELLOW
COLOR, POC UA: YELLOW
GLUCOSE UR QL STRIP: NEGATIVE
GLUCOSE UR QL STRIP: NEGATIVE
HGB UR QL STRIP: ABNORMAL
KETONES UR QL STRIP: NEGATIVE
KETONES UR QL STRIP: NEGATIVE
LEUKOCYTE ESTERASE UR QL STRIP: ABNORMAL
LEUKOCYTE ESTERASE URINE, POC: NORMAL
MICROSCOPIC COMMENT: NORMAL
NITRITE UR QL STRIP: NEGATIVE
NITRITE, POC UA: NEGATIVE
PH UR STRIP: 6 [PH] (ref 5–8)
PH, POC UA: 6
PROT UR QL STRIP: NEGATIVE
PROTEIN, POC: NEGATIVE
RBC #/AREA URNS AUTO: 1 /HPF (ref 0–4)
SP GR UR STRIP: 1.01 (ref 1–1.03)
SPECIFIC GRAVITY, POC UA: 1.01
SQUAMOUS #/AREA URNS AUTO: 0 /HPF
URN SPEC COLLECT METH UR: ABNORMAL
UROBILINOGEN, POC UA: NORMAL
WBC #/AREA URNS AUTO: 4 /HPF (ref 0–5)

## 2024-10-07 PROCEDURE — G0008 ADMIN INFLUENZA VIRUS VAC: HCPCS | Mod: S$GLB,,,

## 2024-10-07 PROCEDURE — 3008F BODY MASS INDEX DOCD: CPT | Mod: CPTII,S$GLB,,

## 2024-10-07 PROCEDURE — 3078F DIAST BP <80 MM HG: CPT | Mod: CPTII,S$GLB,,

## 2024-10-07 PROCEDURE — 99999 PR PBB SHADOW E&M-EST. PATIENT-LVL III: CPT | Mod: PBBFAC,,,

## 2024-10-07 PROCEDURE — 81002 URINALYSIS NONAUTO W/O SCOPE: CPT | Mod: S$GLB,,,

## 2024-10-07 PROCEDURE — 1101F PT FALLS ASSESS-DOCD LE1/YR: CPT | Mod: CPTII,S$GLB,,

## 2024-10-07 PROCEDURE — 90653 IIV ADJUVANT VACCINE IM: CPT | Mod: S$GLB,,,

## 2024-10-07 PROCEDURE — 81001 URINALYSIS AUTO W/SCOPE: CPT

## 2024-10-07 PROCEDURE — 1126F AMNT PAIN NOTED NONE PRSNT: CPT | Mod: CPTII,S$GLB,,

## 2024-10-07 PROCEDURE — 99213 OFFICE O/P EST LOW 20 MIN: CPT | Mod: 25,S$GLB,,

## 2024-10-07 PROCEDURE — 3288F FALL RISK ASSESSMENT DOCD: CPT | Mod: CPTII,S$GLB,,

## 2024-10-07 PROCEDURE — 4010F ACE/ARB THERAPY RXD/TAKEN: CPT | Mod: CPTII,S$GLB,,

## 2024-10-07 PROCEDURE — 1159F MED LIST DOCD IN RCRD: CPT | Mod: CPTII,S$GLB,,

## 2024-10-07 PROCEDURE — 3075F SYST BP GE 130 - 139MM HG: CPT | Mod: CPTII,S$GLB,,

## 2024-10-07 RX ORDER — NITROFURANTOIN 25; 75 MG/1; MG/1
100 CAPSULE ORAL 2 TIMES DAILY
Qty: 10 CAPSULE | Refills: 0 | Status: SHIPPED | OUTPATIENT
Start: 2024-10-07 | End: 2024-10-12

## 2024-10-07 RX ORDER — PHENAZOPYRIDINE HYDROCHLORIDE 100 MG/1
100 TABLET, FILM COATED ORAL 3 TIMES DAILY PRN
Qty: 30 TABLET | Refills: 0 | Status: CANCELLED | OUTPATIENT
Start: 2024-10-07 | End: 2024-10-17

## 2024-10-07 NOTE — PROGRESS NOTES
INTERNAL MEDICINE  [facility]    Reason for visit:   Chief Complaint   Patient presents with    Urinary Tract Infection     Urgency and frequecy       HPI: Rachelle Currie is a 74 y.o. female presenting today for urinary urgency and frequency.    Patient is an established patient of PCP, Dr. Jayna Mohan MD. This patient is new to me.    History of Present Illness    CHIEF COMPLAINT:  Rachelle presents today for urinary urgency and frequency.    URINARY TRACT SYMPTOMS:  She reports urinary urgency and frequency starting yesterday, with minimal output per void. She describes discomfort associated with urination but denies pain or hematuria. Symptoms continued this morning, and she considered seeking emergency care due to severity. She has a history of recurrent UTIs, noting increased frequency with age. A previous severe UTI in [place] required emergency care. These symptoms have impacted her daily activities, causing her to miss [facility] yesterday.    UPCOMING TRAVEL AND APPOINTMENTS:  She has travel plans starting Saturday for a week-long trip to attend a wedding and visit family. She expresses concern about potential health issues during travel. She has a urogynecology appointment scheduled for November 11th but needs to reschedule due to travel conflict.    PREVENTIVE CARE:  She is interested in receiving a flu vaccine.       ROS:  General: -fever, -chills, -fatigue, -weight gain, -weight loss  Gastrointestinal: -abdominal pain, -nausea, -vomiting, -diarrhea, -constipation,   Genitourinary: -dysuria, -hematuria, +frequency, +urgency  Musculoskeletal: -joint pain, -muscle pain  Neurological: -headache, -dizziness, -numbness, -tingling  Psychiatric: -sleep difficulty           Social History     Social History Narrative    From [place]     Moved in [facility] in [address]    Living [facility]     3 kids        ALLERGIES:   Review of patient's allergies indicates:   Allergen Reactions    Talc  "Itching     Irritation around skin       MEDS:   Current Outpatient Medications on File Prior to Visit   Medication Sig Dispense Refill Last Dose/Taking    ergocalciferol (ERGOCALCIFEROL) 50,000 unit Cap Take by mouth.   Taking    losartan (COZAAR) 100 MG tablet Take 1 tablet (100 mg total) by mouth once daily. 90 tablet 3 Taking    multivitamin with minerals tablet Take 1 tablet by mouth. Occasionally   Taking    rivaroxaban (XARELTO) 20 mg Tab Take 1 tablet (20 mg total) by mouth daily with dinner or evening meal. 90 tablet 0 Taking    rosuvastatin (CRESTOR) 40 MG Tab Take 1 tablet (40 mg total) by mouth every evening. 90 tablet 3 Taking    spironolactone (ALDACTONE) 25 MG tablet Take 1 tablet by mouth once daily.   Taking    tirzepatide, weight loss, (ZEPBOUND) 5 mg/0.5 mL PnIj Inject 5 mg into the skin every 7 days. 4 Pen 1 Taking    triamcinolone acetonide 0.5% (KENALOG) 0.5 % Crea Apply topically 2 (two) times daily. 15 g 0 Taking    digoxin (LANOXIN) 125 mcg tablet Take 1 tablet by mouth once daily. (Patient not taking: Reported on 9/23/2024)       metoprolol succinate (TOPROL-XL) 50 MG 24 hr tablet Take 1 tablet (50 mg total) by mouth once daily. (Patient not taking: Reported on 8/9/2024) 90 tablet 3        Vital signs:   Vitals:    10/07/24 1332 10/07/24 1359   BP: (!) 145/92 134/78   BP Location: Left arm    Patient Position: Sitting    Pulse: 69    SpO2: 96%    Weight: 97 kg (213 lb 13.5 oz)    Height: 5' 7" (1.702 m)      Body mass index is 33.49 kg/m².    PHYSICAL EXAM:     Physical Exam      PERTINENT RESULTS:   Office Visit on 10/07/2024   Component Date Value Ref Range Status    Glucose, UA 10/07/2024 negative   Final    Bilirubin, POC 10/07/2024 negative   Final    Ketones, UA 10/07/2024 negative   Final    Spec Grav UA 10/07/2024 1.015   Final    Blood, UA 10/07/2024 moderate   Final    pH, UA 10/07/2024 6.0   Final    Protein, POC 10/07/2024 negative   Final    Urobilinogen, UA 10/07/2024 0.2 E.U. "   Final    Nitrite, UA 10/07/2024 negative   Final    WBC, UA 10/07/2024 trace   Final    Color, UA 10/07/2024 Yellow   Final    Clarity, UA 10/07/2024 Clear   Final       ASSESSMENT/PLAN:    1. Urinary frequency  -     nitrofurantoin, macrocrystal-monohydrate, (MACROBID) 100 MG capsule; Take 1 capsule (100 mg total) by mouth 2 (two) times daily. for 5 days  Dispense: 10 capsule; Refill: 0  -     POCT urine dipstick without microscope  -     Urinalysis, Reflex to Urine Culture Urine, Clean Catch; Future; Expected date: 10/07/2024    2. Need for influenza vaccination  -     influenza (adjuvanted) (Fluad) 45 mcg/0.5 mL IM vaccine (> or = 66 yo) 0.5 mL    3. Essential hypertension, benign  Assessment & Plan:  At goal this visit on current medications      Health maintenance addressed: UTD  Vaccines recommended: Influenza administered today    Follow up if symptoms worsen or fail to improve.     MICHAEL Platt-C   Internal Medicine

## 2024-10-07 NOTE — TELEPHONE ENCOUNTER
----- Message from Med Assistant Thompson sent at 10/7/2024  9:45 AM CDT -----  Name of Who is Calling:SARY SINGER [[id number]]                What is the request in detail: Pt is requesting a call back to see if she can come in late this afternoon or anytime tomorrow for a possible UTI. Please assist.                Can the clinic reply by [facility]: No                What Number to Call Back if not in [facility]: [phone]

## 2024-10-22 ENCOUNTER — PATIENT MESSAGE (OUTPATIENT)
Dept: INTERNAL MEDICINE | Facility: CLINIC | Age: 74
End: 2024-10-22
Payer: MEDICARE

## 2024-10-22 DIAGNOSIS — E66.9 OBESITY (BMI 30-39.9): Primary | ICD-10-CM

## 2024-10-22 RX ORDER — TIRZEPATIDE 7.5 MG/.5ML
7.5 INJECTION, SOLUTION SUBCUTANEOUS
Qty: 4 PEN | Refills: 0 | Status: SHIPPED | OUTPATIENT
Start: 2024-10-22

## 2024-10-22 NOTE — TELEPHONE ENCOUNTER
No care due was identified.  [facility] Embedded Care Due Messages. Reference number: [id number].   10/22/2024 2:03:22 PM CDT

## 2024-11-08 ENCOUNTER — PATIENT MESSAGE (OUTPATIENT)
Dept: ELECTROPHYSIOLOGY | Facility: CLINIC | Age: 74
End: 2024-11-08
Payer: MEDICARE

## 2024-11-19 ENCOUNTER — OFFICE VISIT (OUTPATIENT)
Dept: INTERNAL MEDICINE | Facility: CLINIC | Age: 74
End: 2024-11-19
Attending: INTERNAL MEDICINE
Payer: MEDICARE

## 2024-11-19 VITALS
HEART RATE: 71 BPM | OXYGEN SATURATION: 97 % | HEIGHT: 67 IN | WEIGHT: 212.31 LBS | BODY MASS INDEX: 33.32 KG/M2 | SYSTOLIC BLOOD PRESSURE: 115 MMHG | DIASTOLIC BLOOD PRESSURE: 79 MMHG

## 2024-11-19 DIAGNOSIS — E66.9 OBESITY (BMI 30-39.9): ICD-10-CM

## 2024-11-19 DIAGNOSIS — R73.01 IFG (IMPAIRED FASTING GLUCOSE): ICD-10-CM

## 2024-11-19 DIAGNOSIS — Z51.81 MEDICATION MONITORING ENCOUNTER: Primary | ICD-10-CM

## 2024-11-19 DIAGNOSIS — Z71.3 ENCOUNTER FOR WEIGHT LOSS COUNSELING: ICD-10-CM

## 2024-11-19 DIAGNOSIS — E78.5 HYPERLIPIDEMIA, UNSPECIFIED HYPERLIPIDEMIA TYPE: ICD-10-CM

## 2024-11-19 DIAGNOSIS — I48.91 ATRIAL FIBRILLATION, UNSPECIFIED TYPE: ICD-10-CM

## 2024-11-19 DIAGNOSIS — E55.9 VITAMIN D DEFICIENCY: ICD-10-CM

## 2024-11-19 DIAGNOSIS — I10 ESSENTIAL HYPERTENSION, BENIGN: Chronic | ICD-10-CM

## 2024-11-19 PROCEDURE — 1159F MED LIST DOCD IN RCRD: CPT | Mod: CPTII,S$GLB,, | Performed by: INTERNAL MEDICINE

## 2024-11-19 PROCEDURE — G2211 COMPLEX E/M VISIT ADD ON: HCPCS | Mod: S$GLB,,, | Performed by: INTERNAL MEDICINE

## 2024-11-19 PROCEDURE — 3078F DIAST BP <80 MM HG: CPT | Mod: CPTII,S$GLB,, | Performed by: INTERNAL MEDICINE

## 2024-11-19 PROCEDURE — 99999 PR PBB SHADOW E&M-EST. PATIENT-LVL III: CPT | Mod: PBBFAC,,, | Performed by: INTERNAL MEDICINE

## 2024-11-19 PROCEDURE — 99214 OFFICE O/P EST MOD 30 MIN: CPT | Mod: S$GLB,,, | Performed by: INTERNAL MEDICINE

## 2024-11-19 PROCEDURE — 3288F FALL RISK ASSESSMENT DOCD: CPT | Mod: CPTII,S$GLB,, | Performed by: INTERNAL MEDICINE

## 2024-11-19 PROCEDURE — 3074F SYST BP LT 130 MM HG: CPT | Mod: CPTII,S$GLB,, | Performed by: INTERNAL MEDICINE

## 2024-11-19 PROCEDURE — 4010F ACE/ARB THERAPY RXD/TAKEN: CPT | Mod: CPTII,S$GLB,, | Performed by: INTERNAL MEDICINE

## 2024-11-19 PROCEDURE — 1160F RVW MEDS BY RX/DR IN RCRD: CPT | Mod: CPTII,S$GLB,, | Performed by: INTERNAL MEDICINE

## 2024-11-19 PROCEDURE — 1101F PT FALLS ASSESS-DOCD LE1/YR: CPT | Mod: CPTII,S$GLB,, | Performed by: INTERNAL MEDICINE

## 2024-11-19 PROCEDURE — 3008F BODY MASS INDEX DOCD: CPT | Mod: CPTII,S$GLB,, | Performed by: INTERNAL MEDICINE

## 2024-11-19 RX ORDER — ERGOCALCIFEROL 1.25 MG/1
50000 CAPSULE ORAL
Qty: 12 CAPSULE | Refills: 0 | Status: SHIPPED | OUTPATIENT
Start: 2024-11-19

## 2024-11-19 RX ORDER — TIRZEPATIDE 10 MG/.5ML
10 INJECTION, SOLUTION SUBCUTANEOUS
Qty: 4 PEN | Refills: 3 | Status: SHIPPED | OUTPATIENT
Start: 2024-11-19

## 2024-11-19 NOTE — PROGRESS NOTES
"Subjective:   Patient ID: Rachelle Currie is a 74 y.o. female  Chief complaint:   Chief Complaint   Patient presents with    Hypertension     F/u       HPI  Pt here for htn follow up and medication monitoring - tirzepatide     74F with pAfib, HLD, chornic diast HF, osteopeina, obesity, vit d def, MDD:    Here for tirz f/u   Lost 11 pounds over past few months - cutting back - discussed inc dose - neris 7.5   Discussed inc dose and further titration based on response   Smaller portions     HTN:  - c/w meds per med card - on arb  - controlled today   - neris meds     Afib s/p ablation, cardiomyopathy - systolic and diastolic HF:   Followed by cards   Echo 7/2023  Taking xarelto - not on bb or digoxin   At [facility]:   Followed by cards in [place], [place] prev and then at [facility] - no obstruction  - est care with Dr. Goldstein - [facility] 3/2024  - canceled ablation for later date - to be completed this year     ### not addressed today #####    Recurrent utis:   Reports more episodes of uti   Drinking concentrated cranberry juice with water   Seen providers in past year 4-5 - out of town so no culture date to review   Discussed urogyn eval     OA of knee   To have TKA in future - on hold for now     Vit d def:  - c/w vit d currently 3000mg    BARRIE - due for follow up to complete sleep study   - to be rescheduled     Osteopenia:   Dexa 3/2022  Not c/w vit d suppl     HLD:  - C/w statin     Ifg:   Not on metfomrin     Obesity:   No hx of pancreatitis   No personal or fam hx of men2, mtc    Hx of cva  Ct head 2022   On statin and xarelto     ######    HM:   Cscope utd 3/2024  Mmg due   Dexa due - penia 3/2022  Rsv  Covid     Cards: Ida meeks  Urology: osman    Review of Systems    Objective:  Vitals:    11/19/24 1113 11/19/24 1209   BP: (!) 114/90 115/79   BP Location: Left arm    Patient Position: Sitting    Pulse: 71    SpO2: 97%    Weight: 96.3 kg (212 lb 4.9 oz)    Height: 5' 7" (1.702 m)        Body mass " index is 33.25 kg/m².    Physical Exam  Constitutional:       General: She is not in acute distress.     Appearance: She is well-developed. She is not diaphoretic.   Eyes:      General:         Right eye: No discharge.         Left eye: No discharge.      Extraocular Movements: Extraocular movements intact.      Conjunctiva/sclera: Conjunctivae normal.   Cardiovascular:      Rate and Rhythm: Normal rate.      Pulses: Normal pulses.      Heart sounds: Normal heart sounds.   Pulmonary:      Effort: Pulmonary effort is normal. No respiratory distress.   Musculoskeletal:         General: No swelling or tenderness.   Skin:     General: Skin is warm.      Capillary Refill: Capillary refill takes less than 2 seconds.      Findings: No erythema.   Neurological:      Mental Status: She is alert and oriented to person, place, and time. Mental status is at baseline.   Psychiatric:         Behavior: Behavior normal.         Thought Content: Thought content normal.         Judgment: Judgment normal.       Assessment:  1. Medication monitoring encounter    2. Obesity (BMI 30-39.9)    3. Vitamin D deficiency    4. Atrial fibrillation, unspecified type    5. Essential hypertension, benign    6. Hyperlipidemia, unspecified hyperlipidemia type    7. IFG (impaired fasting glucose)    8. Encounter for weight loss counseling      Medication monitoring encounter    Obesity (BMI 30-39.9)  -     tirzepatide, weight loss, (ZEPBOUND) 10 mg/0.5 mL PnIj; Inject 10 mg into the skin every 7 days.  Dispense: 4 Pen; Refill: 3    Vitamin D deficiency  -     ergocalciferol (ERGOCALCIFEROL) 50,000 unit Cap; Take 1 capsule (50,000 Units total) by mouth every 7 days.  Dispense: 12 capsule; Refill: 0  -     Vitamin D; Future; Expected date: 01/19/2025    Atrial fibrillation, unspecified type  -     CBC Auto Differential; Future; Expected date: 01/19/2025  -     Comprehensive Metabolic Panel; Future; Expected date: 01/19/2025    Essential hypertension,  benign    Hyperlipidemia, unspecified hyperlipidemia type  -     TSH; Future; Expected date: 01/19/2025  -     Lipid Panel; Future; Expected date: 01/19/2025  -     Comprehensive Metabolic Panel; Future; Expected date: 01/19/2025    IFG (impaired fasting glucose)  -     Hemoglobin A1C; Future; Expected date: 01/19/2025    Encounter for weight loss counseling    Preethi angulo   Will send update on how doing in a few weeks   Update labs   Cont LS changes     - cont diet and exercise  - increase intensity and duration of CV exercise to continue weight loss  - goal wt loss one pound per week  - portion control, healthy choices    Visit today included increased complexity associated with the care of the episodic problem htn, obesity addressed and managing the longitudinal care of the patient due to the serious and/or complex managed problem(s) afib.      Health Maintenance   Topic Date Due    Mammogram  08/21/2024    DEXA Scan  03/10/2025    Lipid Panel  06/15/2028    Colorectal Cancer Screening  03/21/2029    TETANUS VACCINE  03/14/2032    Hepatitis C Screening  Completed    Shingles Vaccine  Completed

## 2024-11-21 ENCOUNTER — TELEPHONE (OUTPATIENT)
Dept: ELECTROPHYSIOLOGY | Facility: CLINIC | Age: 74
End: 2024-11-21
Payer: MEDICARE

## 2024-11-21 NOTE — TELEPHONE ENCOUNTER
----- Message from GISELL Weaver sent at 11/21/2024  9:55 AM CST -----  Regarding: FW: Procedure    ----- Message -----  From: Frances Sofia  Sent: 11/21/2024   9:28 AM CST  To: [facility] Staff  Subject: Procedure                                        Patient calling to reschedule her procedure. Please call back @ [phone]. Thank you Frances

## 2024-11-21 NOTE — TELEPHONE ENCOUNTER
Spoke with pt, due to family activities going on for the holidays, she would like to move her ablation back to March. Rescheduled procedure for March 10th

## 2024-12-16 ENCOUNTER — HOSPITAL ENCOUNTER (OUTPATIENT)
Dept: RADIOLOGY | Facility: OTHER | Age: 74
Discharge: HOME | End: 2024-12-16
Attending: INTERNAL MEDICINE
Payer: MEDICARE

## 2024-12-16 DIAGNOSIS — Z12.31 ENCOUNTER FOR SCREENING MAMMOGRAM FOR MALIGNANT NEOPLASM OF BREAST: ICD-10-CM

## 2024-12-16 DIAGNOSIS — N95.9 MENOPAUSAL PROBLEM: ICD-10-CM

## 2024-12-16 PROCEDURE — 77080 DXA BONE DENSITY AXIAL: CPT | Mod: 26,ICN,, | Performed by: RADIOLOGY

## 2024-12-16 PROCEDURE — 77080 DXA BONE DENSITY AXIAL: CPT | Mod: TC

## 2024-12-16 PROCEDURE — 77067 SCR MAMMO BI INCL CAD: CPT | Mod: 26,,, | Performed by: RADIOLOGY

## 2024-12-16 PROCEDURE — 77067 SCR MAMMO BI INCL CAD: CPT | Mod: TC

## 2024-12-16 PROCEDURE — 77063 BREAST TOMOSYNTHESIS BI: CPT | Mod: 26,,, | Performed by: RADIOLOGY

## 2024-12-26 ENCOUNTER — PATIENT MESSAGE (OUTPATIENT)
Dept: INTERNAL MEDICINE | Facility: CLINIC | Age: 74
End: 2024-12-26
Payer: MEDICARE

## 2024-12-26 DIAGNOSIS — E66.9 OBESITY (BMI 30-39.9): Primary | ICD-10-CM

## 2024-12-26 DIAGNOSIS — E66.9 OBESITY (BMI 30-39.9): ICD-10-CM

## 2024-12-26 RX ORDER — TIRZEPATIDE 10 MG/.5ML
10 INJECTION, SOLUTION SUBCUTANEOUS
Qty: 4 PEN | Refills: 3 | Status: CANCELLED | OUTPATIENT
Start: 2024-12-26

## 2024-12-26 RX ORDER — TIRZEPATIDE 12.5 MG/.5ML
12.5 INJECTION, SOLUTION SUBCUTANEOUS
Qty: 4 PEN | Refills: 3 | Status: SHIPPED | OUTPATIENT
Start: 2024-12-26

## 2024-12-26 RX ORDER — TIRZEPATIDE 12.5 MG/.5ML
12.5 INJECTION, SOLUTION SUBCUTANEOUS
Qty: 4 PEN | Refills: 0 | OUTPATIENT
Start: 2024-12-26

## 2024-12-26 NOTE — TELEPHONE ENCOUNTER
Pt requesting zepbound refill. Want to increase dosage. Please advise.     10 mg was already pended. Did not want to to remove if needed.

## 2024-12-26 NOTE — TELEPHONE ENCOUNTER
No care due was identified.  [facility] Embedded Care Due Messages. Reference number: [id number].   12/26/2024 9:16:32 AM CST

## 2024-12-26 NOTE — TELEPHONE ENCOUNTER
Care Due:                  Date            Visit Type   Department     Provider  --------------------------------------------------------------------------------                                EP -                              PRIMARY      [facility] INTERNAL  Last Visit: 11-      CARE ([facility])   MEDICINE       Jayna Mohan                              ESTABLISHED                              PATIENT -    [facility] INTERNAL  Next Visit: 01-      [facility]      MEDICINE       Jayna Mohan                                                            Last  Test          Frequency    Reason                     Performed    Due Date  --------------------------------------------------------------------------------    CMP.........  12 months..  ergocalciferol, losartan,   12- 12-                             rosuvastatin.............    HBA1C.......  6 months...  tirzepatide,.............  06-   12-    Lipid Panel.  12 months..  rosuvastatin.............  06-   06-    Vitamin D...  12 months..  ergocalciferol...........  06-   06-    [employer] Embedded Care Due Messages. Reference number: [id number].   12/26/2024 9:08:19 AM CST

## 2025-01-13 ENCOUNTER — TELEPHONE (OUTPATIENT)
Dept: INTERNAL MEDICINE | Facility: CLINIC | Age: 75
End: 2025-01-13
Payer: MEDICARE

## 2025-01-13 NOTE — TELEPHONE ENCOUNTER
----- Message from Eloisa sent at 1/13/2025  2:17 PM CST -----  Type:  Reschedule Appointment Request     Name of Caller:SARY SINGER [[id number]]    When is the first available appointment?No access    Would the patient rather a call back or a response via [facility]? Call back     Best Call Back Number:[phone]     Additional Information: Patient states she is unable to make labs and virtual visit she would like to reschedule at a later date. Patient states she would like to only see Dr. Mohan if possible. Patient would like a call back with further assistance.

## 2025-01-22 RX ORDER — FLUTICASONE PROPIONATE 50 MCG
1 SPRAY, SUSPENSION (ML) NASAL
Qty: 16 G | Refills: 5 | Status: SHIPPED | OUTPATIENT
Start: 2025-01-22

## 2025-01-22 NOTE — TELEPHONE ENCOUNTER
Refill Routing Note   Medication(s) are not appropriate for processing by [facility] for the following reason(s):        No active prescription written by provider    [facility] action(s):  Defer        Medication Therapy Plan: Script ended 9/12/24      Appointments  past 12m or future 3m with PCP    Date Provider   Last Visit   11/19/2024 Jayna Mohan MD   Next Visit   1/23/2025 Jayna Mohan MD   ED visits in past 90 days: 0        Note composed:11:39 PM 01/21/2025

## 2025-01-22 NOTE — TELEPHONE ENCOUNTER
No care due was identified.  [facility] Embedded Care Due Messages. Reference number: [id number].   1/21/2025 11:14:30 PM CST

## 2025-01-26 ENCOUNTER — PATIENT MESSAGE (OUTPATIENT)
Dept: ELECTROPHYSIOLOGY | Facility: CLINIC | Age: 75
End: 2025-01-26
Payer: MEDICARE

## 2025-02-07 ENCOUNTER — PATIENT MESSAGE (OUTPATIENT)
Dept: INTERNAL MEDICINE | Facility: CLINIC | Age: 75
End: 2025-02-07
Payer: MEDICARE

## 2025-02-07 DIAGNOSIS — E66.9 OBESITY (BMI 30-39.9): Primary | ICD-10-CM

## 2025-02-07 DIAGNOSIS — Z71.3 ENCOUNTER FOR WEIGHT LOSS COUNSELING: ICD-10-CM

## 2025-02-10 RX ORDER — TIRZEPATIDE 15 MG/.5ML
15 INJECTION, SOLUTION SUBCUTANEOUS
Qty: 4 PEN | Refills: 3 | Status: SHIPPED | OUTPATIENT
Start: 2025-02-10 | End: 2025-02-11 | Stop reason: SDUPTHER

## 2025-02-10 NOTE — TELEPHONE ENCOUNTER
No care due was identified.  [facility] Embedded Care Due Messages. Reference number: [id number].   2/10/2025 7:52:26 AM CST

## 2025-02-10 NOTE — TELEPHONE ENCOUNTER
No problem - higher dose sent in  Okay to schedule next appointment as and person with me - for medication monitoring

## 2025-02-11 RX ORDER — TIRZEPATIDE 15 MG/.5ML
15 INJECTION, SOLUTION SUBCUTANEOUS
Qty: 4 PEN | Refills: 3 | Status: SHIPPED | OUTPATIENT
Start: 2025-02-11

## 2025-03-03 ENCOUNTER — PATIENT MESSAGE (OUTPATIENT)
Dept: ELECTROPHYSIOLOGY | Facility: CLINIC | Age: 75
End: 2025-03-03
Payer: MEDICARE

## 2025-03-19 ENCOUNTER — TELEPHONE (OUTPATIENT)
Dept: ELECTROPHYSIOLOGY | Facility: CLINIC | Age: 75
End: 2025-03-19
Payer: MEDICARE

## 2025-04-21 ENCOUNTER — TELEPHONE (OUTPATIENT)
Dept: ELECTROPHYSIOLOGY | Facility: CLINIC | Age: 75
End: 2025-04-21
Payer: MEDICARE

## 2025-04-21 NOTE — TELEPHONE ENCOUNTER
Spoke with pt about rescheduling her ablation to 7/10, asked me to call her back so she can check on some travel plans

## 2025-04-30 ENCOUNTER — TELEPHONE (OUTPATIENT)
Dept: ELECTROPHYSIOLOGY | Facility: CLINIC | Age: 75
End: 2025-04-30
Payer: MEDICARE

## 2025-04-30 NOTE — TELEPHONE ENCOUNTER
Called pt to discuss rescheduling her ablation, see previous phone message, no answer, left voicemail explaining that we previously discussed scheduling her ablation on 7/10 but have not been able to confirm that date with her, explained that if we do not hear back from her by tomorrow, we will no longer be able to hold that date

## 2025-05-05 ENCOUNTER — TELEPHONE (OUTPATIENT)
Dept: INTERNAL MEDICINE | Facility: CLINIC | Age: 75
End: 2025-05-05
Payer: MEDICARE

## 2025-05-05 NOTE — TELEPHONE ENCOUNTER
Staff contacted patient, patient informed staff that she needs to reschedule her appointment with . Patient was rescheduled. Patient also states her pharmacy is having trouble using her insurance to cover the ZEPBOUND. The line disconnected, staff attempted to contact patient back, patient was unavailable.

## 2025-05-05 NOTE — TELEPHONE ENCOUNTER
----- Message from Ree sent at 5/5/2025 10:44 AM CDT -----  Name of Who is Calling:SARY SINGER [[id number]]What is the request in detail: pt is requesting a call back regarding reschedule an appt, refill on medications and questions for the doctor or MA. Pt did not go into details on the matter. Please contact to further discuss and advise.  Can the clinic reply by [facility]:call What Number to Call Back if not in [email or web address].[phone]

## 2025-05-16 ENCOUNTER — PATIENT MESSAGE (OUTPATIENT)
Dept: DERMATOLOGY | Facility: CLINIC | Age: 75
End: 2025-05-16
Payer: MEDICARE

## 2025-05-19 ENCOUNTER — OFFICE VISIT (OUTPATIENT)
Dept: UROGYNECOLOGY | Facility: CLINIC | Age: 75
End: 2025-05-19
Attending: INTERNAL MEDICINE
Payer: MEDICARE

## 2025-05-19 VITALS
DIASTOLIC BLOOD PRESSURE: 84 MMHG | HEIGHT: 67 IN | BODY MASS INDEX: 32.76 KG/M2 | SYSTOLIC BLOOD PRESSURE: 122 MMHG | WEIGHT: 208.75 LBS | HEART RATE: 53 BPM

## 2025-05-19 DIAGNOSIS — N39.46 MIXED INCONTINENCE URGE AND STRESS: ICD-10-CM

## 2025-05-19 DIAGNOSIS — N81.89 PELVIC FLOOR WEAKNESS: ICD-10-CM

## 2025-05-19 DIAGNOSIS — N39.0 RECURRENT UTI: Primary | ICD-10-CM

## 2025-05-19 DIAGNOSIS — N89.8 VAGINAL DRYNESS: ICD-10-CM

## 2025-05-19 DIAGNOSIS — Z12.4 ENCOUNTER FOR SCREENING FOR CERVICAL CANCER: ICD-10-CM

## 2025-05-19 PROCEDURE — 3008F BODY MASS INDEX DOCD: CPT | Mod: CPTII,S$GLB,, | Performed by: NURSE PRACTITIONER

## 2025-05-19 PROCEDURE — 3074F SYST BP LT 130 MM HG: CPT | Mod: CPTII,S$GLB,, | Performed by: NURSE PRACTITIONER

## 2025-05-19 PROCEDURE — 4010F ACE/ARB THERAPY RXD/TAKEN: CPT | Mod: CPTII,S$GLB,, | Performed by: NURSE PRACTITIONER

## 2025-05-19 PROCEDURE — 99999 PR PBB SHADOW E&M-EST. PATIENT-LVL V: CPT | Mod: PBBFAC,,, | Performed by: NURSE PRACTITIONER

## 2025-05-19 PROCEDURE — 51701 INSERT BLADDER CATHETER: CPT | Mod: S$GLB,,, | Performed by: NURSE PRACTITIONER

## 2025-05-19 PROCEDURE — 3079F DIAST BP 80-89 MM HG: CPT | Mod: CPTII,S$GLB,, | Performed by: NURSE PRACTITIONER

## 2025-05-19 PROCEDURE — 88175 CYTOPATH C/V AUTO FLUID REDO: CPT | Mod: TC | Performed by: NURSE PRACTITIONER

## 2025-05-19 PROCEDURE — 87086 URINE CULTURE/COLONY COUNT: CPT | Performed by: NURSE PRACTITIONER

## 2025-05-19 PROCEDURE — 1126F AMNT PAIN NOTED NONE PRSNT: CPT | Mod: CPTII,S$GLB,, | Performed by: NURSE PRACTITIONER

## 2025-05-19 PROCEDURE — 99203 OFFICE O/P NEW LOW 30 MIN: CPT | Mod: 25,S$GLB,, | Performed by: NURSE PRACTITIONER

## 2025-05-19 PROCEDURE — 1160F RVW MEDS BY RX/DR IN RCRD: CPT | Mod: CPTII,S$GLB,, | Performed by: NURSE PRACTITIONER

## 2025-05-19 PROCEDURE — 1159F MED LIST DOCD IN RCRD: CPT | Mod: CPTII,S$GLB,, | Performed by: NURSE PRACTITIONER

## 2025-05-19 RX ORDER — ESTRADIOL 0.1 MG/G
1 CREAM VAGINAL DAILY
Qty: 42.5 G | Refills: 3 | Status: SHIPPED | OUTPATIENT
Start: 2025-05-19

## 2025-05-19 NOTE — PATIENT INSTRUCTIONS
1)  Frequent UTIs (bladder infections):  --urine C&S sent today  --If you feel like you have a UTI:     --During the work week: call PCP or go to nearest [facility]   --After hours or on weekends: go to nearest [facility]    --These facilities can check your urine for infection, send a urine culture to verify presence/absence of infection, and start treatment if needed.    --After you are evaluated, please send a message through [facility] portal or call your urogynecology provider's office to let them know so that they can follow trends.  --follow UTI prevention tips (see attached)  --work on emptying bladder well:  --empty bladder every 2-3 hours.  Empty well: wait a minute, lean forward on toilet.    --prolapse present: stage 1 cystocele   --PVR today: 10  --control bowel movements/fecal cross-contamination  --treat vaginal atrophy (dryness)  --empty bladder before and after intercourse  --consider taking daily combination pill: cranberry + D-mannose (5839-7461 mg) + probiotic    --look on [employer] or in drug/grocery store for any brand that has these components   --examples of brands: Biophix, Now, AZO  --consider need for further evaluation ( tract imaging, cystoscopy) if issue persists  --last  tract imaging: none  --last cystoscopy: none     2)vaginal dryness  --start vaginal estrogen cream   ----use dime size amount in vagina-- insert to your second knuckle and rub around just inside vaginal opening nightly x 2 weeks then twice weekly    3)Constipation:  --hydrate well  Controlling constipation may help bladder urgency/leakage and fiber may better control cholesterol and blood glucose.  Start daily fiber.  Take 1 tsp of fiber powder (psyllium or other sugar-free powder).  Mix in 8 oz of water.  Take x 3-5 days.  Then, increase fiber by 1 tsp every 3-5 days until stool is easy to pass.  Stop and continue at that dose.   Do not exceed 6 tsps/day.  May also use over the counter stool  softener 1-2 x/day.  AVOID laxatives.    4)Mixed urinary incontinence, urge > stress:    --Empty bladder every 3 hours.  Empty well: wait a minute, lean forward on toilet.    --Avoid dietary irritants (see sheet).  Keep diary x 3-5 days to determine your irritants.  --KEGELS: do 10 in AM and 10 in PM, holding each x 10 seconds.  When you feel urge to go, STOP, KEGEL, and when urge has passed, then go to bathroom.  Consider PT in future.    --URGE: consider anticholinergic.   Takes 2-4 weeks to see if will have effect.  For dry mouth: get sour, sugar free lozenge or gum.    --STRESS:  Pessary vs. Sling.   --start pelvic floor Pt with   MAGALY Heck. (p) [phone].      5)RTC 4 months for follow up    Bladder Irritants  Certain foods and drinks have been associated with worsening symptoms of urinary frequency, urgency, urge incontinence, or  bladder pain. If you suffer from any of these conditions, you may wish to try eliminating one or more of these foods from your  diet and see if your symptoms improve. If bladder symptoms are related to dietary factors, strict adherence to a diet that  eliminates the food should bring marked relief in 10 days. Once you are feeling better, you can begin to add foods back into  your diet, one at a time. If symptoms return, you will be able to identify the irritant. As you add foods back to your diet it is  very important that you drink significant amounts of water.  Low-acid fruit substitutions include apricots, papaya, pears and watermelon. Coffee drinkers can drink Kava or other lowacid  instant drinks. Tea drinkers can substitute non-citrus herbal and sun brewed teas. Calcium carbonate co-buffered with  calcium ascorbate can be substituted for Vitamin C. Prelief is a dietary supplement that works as an acid blocker for the  bladder.  Where to get more information:   Overcoming Bladder Disorders by Sanaz Barrios and Yumiko Gray, 1990   You Dont  Have to Live with Cystitis! By Nely Bee, 1988  List of Common Bladder Irritants*  Alcoholic beverages  Apples and apple juice  Cantaloupe  Carbonated beverages  Chili and spicy foods  Chocolate  Citrus fruit  Coffee (including decaffeinated)  Cranberries and cranberry juice  Grapes  Guava  Milk Products: milk, cheese, cottage cheese, yogurt, ice cream  Peaches  Pineapple  Plums  Strawberries  Sugar especially artificial sweeteners, saccharin, aspartame, corn sweeteners, honey, fructose, sucrose, lactose  Tea  Tomatoes and tomato juice  Vitamin B complex  Vinegar  *Most people are not sensitive to ALL of these products; your goal is to find the foods that make YOUR  symptoms worse

## 2025-05-19 NOTE — PROGRESS NOTES
[facility] - UROGYNECOLOGY  [address] [address]    Rachelle Currie  [id number]  1950  May 19, 2025    Consulting Physician: Jayna Mohan MD     Primary M.D.: Jayna Mohan MD    Chief Complaint   Patient presents with    Urinary Tract Infection     Recurrent       HPI:     1)  UI:  (+) CRIS = (+) UUI  X 1years.  (+) pads--intermittently with minimal wetness.  Daytime frequency: Q 1 - 1 1/2 hours.  Nocturia: Yes: 6/night.  Does not always limit fluids prior to bedtime.  (--) dysuria,  (--) hematuria,  (+) frequent UTIs.  (+) complete bladder emptying.     2)  POP:  Absent. .  Symptoms:(--)  .  (--) vaginal bleeding. (--) vaginal discharge. (--) sexually active.   (+)  Vaginal dryness.  (--) vaginal estrogen use.     3)  BM:  (+) constipation/straining.  (--) chronic diarrhea. (--) hematochezia.  (--) fecal incontinence.  (--) fecal smearing/urgency.  (--) complete evacuation.  Takes dulcolax intermittently    4) Frequent UTIs (bladder infections):  --symptoms:urinary frequency, incomplete emptying, dysuria  --related in intercourse no  --using estrogen cream no  --not using cranberry, dmannose--taking culturelle  --prior  tract imaging: none  --prior cystoscopy: none  --recent urine cultures no cultures on file       Past Medical History  Past Medical History:   Diagnosis Date    Atrial fibrillation     Hypertension         Past Surgical History  Past Surgical History:   Procedure Laterality Date    CHOLECYSTECTOMY      COLONOSCOPY N/A 03/21/2024    Procedure: COLONOSCOPY;  Surgeon: Manuel Rhodes MD;  Location: [facility] ENDOSCOPY;  Service: Endoscopy;  Laterality: N/A;  suprep-xarelto hold pending-instr portal-tb  ok to hold Xarelto 2 days per Dr Goldstein/MARGARET Canela RN-GT  3/14- pc complete. DBM    LAPAROSCOPIC GASTRIC BANDING      port subsequently removed and now no longer functional    TUBAL LIGATION  1982        Hysterectomy: No      Past Ob  History     x 3  Largest infant weight: 9# 6 oz  no FAVD. no episiotomy.      Gynecologic History  LMP: No LMP recorded. Patient is postmenopausal.  Age of menarche: 14  Age of menopause: 50's  Menstrual history: metrorrhagia  Pap test: unknown.  History of abnormal paps: No.  History of STIs:  No  Mammogram: Date of last: 2024.  Result: Normal  Colonoscopy: Date of last: 2024.  Result:  polyps, diverticulosis, internal hemorrhoids.  Repeat due:  5 years.    DEXA:  Date of last: 2024.  Result:  Osteopenia .    Family History  Family History   Problem Relation Name Age of Onset    Heart disease Mother Nanyc         MI    Cancer Mother Nancy 84        colon ca    Asthma Mother Nancy     Cancer Father Jose         lung ca--smoker    Hypertension Father Jose     Multiple sclerosis Daughter Francesca       Colon CA: Yes - mother  Breast CA: Yes - maternal aunt  GYN CA: No   CA: No    Social History  Tobacco Use History[1].    Social History     Substance and Sexual Activity   Alcohol Use Yes    Alcohol/week: 2.0 standard drinks of alcohol    Types: 2 Glasses of wine per week    Comment: Hardly Ever   .    Social History     Substance and Sexual Activity   Drug Use No       Allergies  Review of patient's allergies indicates:   Allergen Reactions    Talc Itching     Irritation around skin       Medications  Medications Ordered Prior to Encounter[2]    Review of Systems A 14 point ROS was reviewed with pertinent positives as noted above in the history of present illness.      Constitutional: negative  Eyes: negative  Endocrine: negative  Gastrointestinal: negative  Cardiovascular: negative  Respiratory: negative  Allergic/Immunologic: negative  Integumentary: negative  Psychiatric: negative  Musculoskeletal: negative   Ear/Nose/Throat: negative  Neurologic: negative  Genitourinary: SEE HPI  Hematologic/Lymphatic: negative   Breast: negative    Urogynecologic Exam  /84 (BP Location: Left arm, Patient  "Position: Sitting)   Pulse (!) 53   Ht 5' 7" (1.702 m)   Wt 94.7 kg (208 lb 12.4 oz)   BMI 32.70 kg/m²     GENERAL APPEARANCE:  The patient is well-developed, well-nourished.   Neck:  Supple with no thyromegaly, no carotid bruits.  Heart:  Regular rate and rhythm, no murmurs, rubs or gallops.  Lungs:  Clear.  No CVA tenderness.  Abdomen:  Soft, nontender, nondistended, no hepatosplenomegaly.  Incisions:  lap sites    PELVIC:    External genitalia:  Normal Bartholins, Skenes and labia bilaterally.    Urethra:  No caruncle, diverticulum or masses.  (--) hypermobility.    Vagina:  Atrophy (+) , no bladder masses or tender, no discharge.    Cervix:  normal appearance  Uterus: normal size, contour, position, consistency, mobility, non-tender  Adnexa: Not palpable.    POP-Q:  Aa -1; Ba -1; C -5; Ap -3; Bp -3; D -9.  Genital hiatus 4, perineal body 2 total vaginal length 9.      NEUROLOGIC:  Cranial nerves 2 through 12 intact.  Strength 5/5.  DTRs 2+ lower extremities.  S2 through 4 normal.  Sacral reflexes intact.    EXT: ARGUETA, 2+ pulses bilaterally, no C/C/E    COUGH STRESS TEST:  negative  KEGEL: 1 /5    RECTAL:    External:  Normal, (+) hemorrhoids, (--) dovetailing.   Internal:  deferred    PVR: 10 mL    Impression    1. Recurrent UTI    2. Mixed incontinence urge and stress    3. Vaginal dryness    4. Encounter for screening for cervical cancer    5. Pelvic floor weakness        Initial Plan  The patient was counseled regarding these issues. The patient was given a summary sheet containing each of these issues with possible options for evaluation and management. When appropriate, we also reviewed computer-generated diagrams specific to their diagnoses..  All questions were addressed to the patient's satisfaction.     1)  Frequent UTIs (bladder infections):  --urine C&S sent today  --If you feel like you have a UTI:     --During the work week: call PCP or go to nearest [facility]   --After hours or on " weekends: go to nearest [facility]    --These facilities can check your urine for infection, send a urine culture to verify presence/absence of infection, and start treatment if needed.    --After you are evaluated, please send a message through [facility] portal or call your urogynecology provider's office to let them know so that they can follow trends.  --follow UTI prevention tips (see attached)  --work on emptying bladder well:  --empty bladder every 2-3 hours.  Empty well: wait a minute, lean forward on toilet.    --prolapse present: stage 1 cystocele   --PVR today: 10  --control bowel movements/fecal cross-contamination  --treat vaginal atrophy (dryness)  --empty bladder before and after intercourse  --consider taking daily combination pill: cranberry + D-mannose (5774-4054 mg) + probiotic    --look on [employer] or in drug/grocery store for any brand that has these components   --examples of brands: Biophix, Now, AZO  --consider need for further evaluation ( tract imaging, cystoscopy) if issue persists  --last  tract imaging: none  --last cystoscopy: none     2)vaginal dryness  --start vaginal estrogen cream   ----use dime size amount in vagina-- insert to your second knuckle and rub around just inside vaginal opening nightly x 2 weeks then twice weekly    3)Constipation:  --hydrate well  Controlling constipation may help bladder urgency/leakage and fiber may better control cholesterol and blood glucose.  Start daily fiber.  Take 1 tsp of fiber powder (psyllium or other sugar-free powder).  Mix in 8 oz of water.  Take x 3-5 days.  Then, increase fiber by 1 tsp every 3-5 days until stool is easy to pass.  Stop and continue at that dose.   Do not exceed 6 tsps/day.  May also use over the counter stool softener 1-2 x/day.  AVOID laxatives.    4)Mixed urinary incontinence, urge > stress:    --Empty bladder every 3 hours.  Empty well: wait a minute, lean forward on toilet.    --Avoid dietary irritants (see  sheet).  Keep diary x 3-5 days to determine your irritants.  --KEGELS: do 10 in AM and 10 in PM, holding each x 10 seconds.  When you feel urge to go, STOP, KEGEL, and when urge has passed, then go to bathroom.  Consider PT in future.    --URGE: consider anticholinergic.   Takes 2-4 weeks to see if will have effect.  For dry mouth: get sour, sugar free lozenge or gum.    --STRESS:  Pessary vs. Sling.   --start pelvic floor Pt with   MAGALY Heck. (p) [phone].      5)RTC 4 months for follow up    I spent a total of 30 minutes on the day of the visit.  This includes face to face time and non-face to face time preparing to see the patient (eg, review of tests), obtaining and/or reviewing separately obtained history, documenting clinical information in the electronic or other health record, independently interpreting results and communicating results to the patient/family/caregiver, or care coordinator.     Thank you for requesting consultation of your patient.  I look forward to participating in their care.    Mitra Leos  Female Pelvic Medicine and Reconstructive Surgery  [facility] [place], [place]         [1]   Social History  Tobacco Use   Smoking Status Never   Smokeless Tobacco Never   [2]   Current Outpatient Medications on File Prior to Visit   Medication Sig Dispense Refill    ergocalciferol (ERGOCALCIFEROL) 50,000 unit Cap Take 1 capsule (50,000 Units total) by mouth every 7 days. 12 capsule 0    fluticasone propionate (FLONASE) 50 mcg/actuation nasal spray USE 1 SPRAY IN EACH NOSTRIL DAILY 16 g 5    losartan (COZAAR) 100 MG tablet Take 1 tablet (100 mg total) by mouth once daily. 90 tablet 3    multivitamin with minerals tablet Take 1 tablet by mouth. Occasionally      rivaroxaban (XARELTO) 20 mg Tab Take 1 tablet (20 mg total) by mouth daily with dinner or evening meal. 90 tablet 0    rosuvastatin (CRESTOR) 40 MG Tab Take 1 tablet (40 mg total) by mouth every evening. 90  tablet 3    spironolactone (ALDACTONE) 25 MG tablet Take 1 tablet by mouth once daily.      tirzepatide, weight loss, (ZEPBOUND) 15 mg/0.5 mL PnIj Inject 15 mg into the skin every 7 days. 4 Pen 3    triamcinolone acetonide 0.5% (KENALOG) 0.5 % Crea Apply topically 2 (two) times daily. 15 g 0    digoxin (LANOXIN) 125 mcg tablet Take 1 tablet by mouth once daily. (Patient not taking: Reported on 5/19/2025)      metoprolol succinate (TOPROL-XL) 50 MG 24 hr tablet Take 1 tablet (50 mg total) by mouth once daily. (Patient not taking: Reported on 8/9/2024) 90 tablet 3     No current facility-administered medications on file prior to visit.

## 2025-05-20 LAB — BACTERIA UR CULT: NO GROWTH

## 2025-05-21 ENCOUNTER — OFFICE VISIT (OUTPATIENT)
Dept: DERMATOLOGY | Facility: CLINIC | Age: 75
End: 2025-05-21
Payer: MEDICARE

## 2025-05-21 ENCOUNTER — RESULTS FOLLOW-UP (OUTPATIENT)
Dept: UROGYNECOLOGY | Facility: CLINIC | Age: 75
End: 2025-05-21

## 2025-05-21 DIAGNOSIS — Z12.83 SKIN EXAM, SCREENING FOR CANCER: ICD-10-CM

## 2025-05-21 DIAGNOSIS — L82.1 SEBORRHEIC KERATOSES: ICD-10-CM

## 2025-05-21 DIAGNOSIS — D18.01 CHERRY ANGIOMA: ICD-10-CM

## 2025-05-21 DIAGNOSIS — D22.9 NEVUS OF MULTIPLE SITES: Primary | ICD-10-CM

## 2025-05-21 DIAGNOSIS — L81.4 LENTIGINES: ICD-10-CM

## 2025-05-21 PROCEDURE — 99999 PR PBB SHADOW E&M-EST. PATIENT-LVL III: CPT | Mod: PBBFAC,,, | Performed by: DERMATOLOGY

## 2025-05-21 NOTE — PROGRESS NOTES
Subjective:      Patient ID:  Rachelle Currie is a 75 y.o. female who presents for   Chief Complaint   Patient presents with    Skin Check     Would like skin check, new spots on chest.         Review of Systems   Constitutional:  Negative for fever, chills, weight loss, weight gain, fatigue, night sweats and malaise.   Skin:  Negative for itching, dry skin and wears hat.   Hematologic/Lymphatic: Does not bruise/bleed easily.       Objective:   Physical Exam   Constitutional: She appears well-developed and well-nourished. No distress.   Neurological: She is alert and oriented to person, place, and time. She is not disoriented.   Psychiatric: She has a normal mood and affect.   Skin:   Areas Examined (abnormalities noted in diagram):   Head / Face Inspection Performed  Neck Inspection Performed  Chest / Axilla Inspection Performed  Abdomen Inspection Performed  Back Inspection Performed  RUE Inspected  LUE Inspection Performed            Diagram Legend     Erythematous scaling macule/papule c/w actinic keratosis       Vascular papule c/w angioma      Pigmented verrucoid papule/plaque c/w seborrheic keratosis      Yellow umbilicated papule c/w sebaceous hyperplasia      Irregularly shaped tan macule c/w lentigo     1-2 mm smooth white papules consistent with Milia      Movable subcutaneous cyst with punctum c/w epidermal inclusion cyst      Subcutaneous movable cyst c/w pilar cyst      Firm pink to brown papule c/w dermatofibroma      Pedunculated fleshy papule(s) c/w skin tag(s)      Evenly pigmented macule c/w junctional nevus     Mildly variegated pigmented, slightly irregular-bordered macule c/w mildly atypical nevus      Flesh colored to evenly pigmented papule c/w intradermal nevus       Pink pearly papule/plaque c/w basal cell carcinoma      Erythematous hyperkeratotic cursted plaque c/w SCC      Surgical scar with no sign of skin cancer recurrence      Open and closed comedones      Inflammatory papules and  "pustules      Verrucoid papule consistent consistent with wart     Erythematous eczematous patches and plaques     Dystrophic onycholytic nail with subungual debris c/w onychomycosis     Umbilicated papule    Erythematous-base heme-crusted tan verrucoid plaque consistent with inflamed seborrheic keratosis     Erythematous Silvery Scaling Plaque c/w Psoriasis     See annotation  Mid back    Assessment / Plan:        Nevus of multiple sites  The "ABCD" rules to observe pigmented lesions were reviewed.  Brochure provided      Seborrheic keratoses  Seborrheic keratosis scattered, told benign no treatment needed.  Brochure provided.     Lentigines  The "ABCD" rules to observe pigmented lesions were reviewed.      Skin exam, screening for cancer   No lesions suspicious for malignancy noted.    Recommend daily sun protection/avoidance and use of at least SPF 30, broad spectrum sunscreen (OTC drug).       Cherry angiomas  This is a benign vascular lesion. Reassurance given. No treatment required.                Follow up in about 1 year (around 5/21/2026).  "

## 2025-05-23 LAB
HPV DNA, HIGH RISK TYPE 16, PCR (OHS): NEGATIVE
HPV DNA, HIGH RISK TYPE 18, PCR (OHS): NEGATIVE
HPV DNA, HIGH RISK TYPE OTHER, PCR (OHS): NEGATIVE
INSULIN SERPL-ACNC: NORMAL U[IU]/ML
LAB AP BETHESDA CATEGORY: NORMAL
LAB AP CLINICAL FINDINGS: NORMAL
LAB AP CONTRACEPTIVES: NORMAL
LAB AP GYN ADDITIONAL FINDINGS: NORMAL
LAB AP OCHS PAP SPECIMEN ADEQUACY: NORMAL
LAB AP OHS PAP INTERPRETATION: NORMAL
LAB AP PAP DISCLAIMER COMMENTS: NORMAL
LAB AP PAP ESTROGEN REPLACEMENT THERAPY: NORMAL
LAB AP PAP PMP: NORMAL
LAB AP PAP PREVIOUS BX: NORMAL
LAB AP PAP PRIOR TREATMENT: NORMAL
LAB AP PERFORMING LOCATION(S): NORMAL

## 2025-05-30 ENCOUNTER — TELEPHONE (OUTPATIENT)
Dept: ELECTROPHYSIOLOGY | Facility: CLINIC | Age: 75
End: 2025-05-30
Payer: MEDICARE

## 2025-05-30 NOTE — TELEPHONE ENCOUNTER
----- Message from Jil sent at 5/27/2025 11:15 AM CDT -----  Regarding: procedure  Pt is calling to schedule her procedure and can be reached at [phone].Thank you

## 2025-06-09 ENCOUNTER — TELEPHONE (OUTPATIENT)
Dept: INTERNAL MEDICINE | Facility: CLINIC | Age: 75
End: 2025-06-09
Payer: MEDICARE

## 2025-06-09 DIAGNOSIS — I48.0 PAROXYSMAL ATRIAL FIBRILLATION: Chronic | ICD-10-CM

## 2025-06-09 RX ORDER — RIVAROXABAN 20 MG/1
20 TABLET, FILM COATED ORAL
Qty: 90 TABLET | Refills: 3 | OUTPATIENT
Start: 2025-06-09

## 2025-06-09 NOTE — TELEPHONE ENCOUNTER
Care Due:                  Date            Visit Type   Department     Provider  --------------------------------------------------------------------------------                                EP -                              PRIMARY      [facility] INTERNAL  Last Visit: 11-      CARE ([facility])   MEDICINE       Jayna Mohan                              EP -                              PRIMARY      [facility] INTERNAL  Next Visit: 07-      CARE ([facility])   MEDICINE       Jayna Mohan                                                            Last  Test          Frequency    Reason                     Performed    Due Date  --------------------------------------------------------------------------------    CBC.........  12 months..  rivaroxaban..............  12- 12-    CMP.........  12 months..  ergocalciferol, losartan,   12- 12-                             rivaroxaban, rosuvastatin    HBA1C.......  6 months...  tirzepatide,.............  06-   12-    Lipid Panel.  12 months..  rosuvastatin.............  06-   06-    Vitamin D...  12 months..  ergocalciferol...........  06-   06-    [facility] Embedded Care Due Messages. Reference number: [id number].   6/09/2025 12:10:24 PM CDT

## 2025-06-09 NOTE — TELEPHONE ENCOUNTER
----- Message from Med Assistant Fabián sent at 6/9/2025 11:30 AM CDT -----  Type: Patient Call BackWho called: selfWhat is the request in detail: states [facility] will be sending a form regarding her medication refill ... She's asking for a 7 day supply be sent to her local pharmacy please.. rivaroxaban (XARELTO) 20 mg TabCan the clinic reply by [facility]?NOWould the patient rather a call back or a response via [facility]? Yes, call Best call back number: [phone] (home)

## 2025-06-09 NOTE — TELEPHONE ENCOUNTER
Please let me know if she is requesting a refill through her mail order? Or [facility] as I received another message that pt requested 1 week supply to be [facility] to [facility] which was sent today     If 90 day supply needed then I can send it but want to clarify to which pharmacy this should be sent

## 2025-06-09 NOTE — TELEPHONE ENCOUNTER
----- Message from Med Assistant Fabián sent at 6/9/2025 11:30 AM CDT -----  Type: Patient Call BackWho called: selfWhat is the request in detail: states [facility] will be sending a form regarding her medication refill ... She's asking for a 7 day supply be sent to her local pharmacy please.. rivaroxaban (XARELTO) 20 mg TabCan the clinic reply by [facility]?NOWould the patient rather a call back or a response via [facility]? Yes, call [facility] call back number: [phone] (home)

## 2025-06-09 NOTE — TELEPHONE ENCOUNTER
Refill Encounter    PCP Visits: Recent Visits  Date Type Provider Dept   11/19/24 Office Visit Jayna Mohan MD [facility] Internal Medicine   10/07/24 Office Visit Leti Fields FNP-C [facility] Internal Medicine   08/13/24 Office Visit Jayna Mohan MD [facility] Internal Medicine   08/09/24 Office Visit Jayna Mohan MD [facility] Internal Medicine   Showing recent visits within past 360 days and meeting all other requirements  Future Appointments  Date Type Provider Dept   07/10/25 Appointment Jayna Mohan MD [facility] Internal Medicine   Showing future appointments within next 720 days and meeting all other requirements      Last 3 Blood Pressure:   BP Readings from Last 3 Encounters:   05/19/25 122/84   11/19/24 115/79   10/07/24 134/78     Preferred Pharmacy:   [id number] [address] [place] [address]  Phone: [phone] Fax: [phone]      Requested RX:  Requested Prescriptions     Pending Prescriptions Disp Refills    rivaroxaban (XARELTO) 20 mg Tab 90 tablet 0     Sig: Take 1 tablet (20 mg total) by mouth daily with dinner or evening meal.      RX Route: Normal

## 2025-06-09 NOTE — TELEPHONE ENCOUNTER
No problem - 1 week supply [facility] to [facility]   Does she need a prescription sent to her mail order now as well? If so I can go ahead and send in refills - please pend if so

## 2025-06-09 NOTE — TELEPHONE ENCOUNTER
No care due was identified.  [facility] Embedded Care Due Messages. Reference number: [id number].   6/09/2025 1:43:16 PM CDT

## 2025-06-10 NOTE — TELEPHONE ENCOUNTER
Staff tried reaching patient to inform her that Dr. Mohan sent in a 1 week supply to [facility] and wanted to know did she also want a prescription sent into her mail order pharmacy. Staff left a detailed voice message.

## 2025-06-11 ENCOUNTER — TELEPHONE (OUTPATIENT)
Dept: ELECTROPHYSIOLOGY | Facility: CLINIC | Age: 75
End: 2025-06-11
Payer: MEDICARE

## 2025-06-11 DIAGNOSIS — I48.0 PAROXYSMAL ATRIAL FIBRILLATION: Chronic | ICD-10-CM

## 2025-06-11 DIAGNOSIS — E66.9 OBESITY (BMI 30-39.9): ICD-10-CM

## 2025-06-11 DIAGNOSIS — Z71.3 ENCOUNTER FOR WEIGHT LOSS COUNSELING: ICD-10-CM

## 2025-06-11 RX ORDER — TIRZEPATIDE 15 MG/.5ML
15 INJECTION, SOLUTION SUBCUTANEOUS
Qty: 4 PEN | Refills: 3 | Status: SHIPPED | OUTPATIENT
Start: 2025-06-11

## 2025-06-11 NOTE — TELEPHONE ENCOUNTER
No care due was identified.  [facility] Embedded Care Due Messages. Reference number: [id number].   6/11/2025 10:34:04 AM CDT

## 2025-06-11 NOTE — TELEPHONE ENCOUNTER
Spoke with patient and she states she'll  medication below at listed pharmacy but she needs a 90 day 3mth supply script sent to [facility] to prevent getting blood clots if she's off the medication too long.     Disp Refills Start End TU   rivaroxaban (XARELTO) 20 mg Tab 7 tablet 0 6/9/2025 6/16/2025 No   Sig - Route: Take 1 tablet (20 mg total) by mouth daily with dinner or evening meal. for 7 days - Oral   Sent to pharmacy as: rivaroxaban (XARELTO) 20 mg Tab   Class: Normal   Order: [id number]   Date/Time Signed: 6/9/2025 14:27       E-Prescribing Status: Receipt confirmed by pharmacy (6/9/2025  2:27 PM CDT)   Prior authorization: Closed - Other     Pharmacy    [id number] [place] [address] AT [facility]    Associated Diagnoses    Paroxysmal atrial fibrillation             I also pended Zepbound to be sent to   [facility] #[id number] [place] [address] [address]  AT [facility]   Patient said her  told her that the Zepbound needs to be sent to a [facility] pharmacy from now on and no longer at an [facility] pharmacy.

## 2025-06-11 NOTE — TELEPHONE ENCOUNTER
----- Message from Med Assistant Archer sent at 6/9/2025 12:28 PM CDT -----  Regarding: FW: Returning call    ----- Message -----  From: Frances Sofia  Sent: 6/9/2025  12:24 PM CDT  To: Triston Thornton Staff  Subject: Returning call                                   Patient returning call. Please call back @ [phone]. Thank you Frances

## 2025-06-11 NOTE — TELEPHONE ENCOUNTER
----- Message from Allimatthiasin sent at 6/10/2025  3:22 PM CDT -----  Type : Patient Call  Who Called : Patient   Does the patient know what this is regarding?: Pt is requesting a call back in regards to a missed call . Pt needs to speak with the nurse in regards to her medications and where she would like them sent .Pt is stating she is confused about her prescriptions.  Please Advise   Would the patient rather a call back or a response via [facility]? Call   [facility] Call Back Number: [phone]  Additional Information:

## 2025-06-12 DIAGNOSIS — I48.0 PAROXYSMAL ATRIAL FIBRILLATION: Primary | Chronic | ICD-10-CM

## 2025-06-13 DIAGNOSIS — I48.0 PAROXYSMAL ATRIAL FIBRILLATION: Chronic | ICD-10-CM

## 2025-06-13 RX ORDER — RIVAROXABAN 20 MG/1
20 TABLET, FILM COATED ORAL NIGHTLY
Qty: 90 TABLET | Refills: 3 | Status: SHIPPED | OUTPATIENT
Start: 2025-06-13

## 2025-06-13 NOTE — TELEPHONE ENCOUNTER
No care due was identified.  [facility] Embedded Care Due Messages. Reference number: [id number].   6/13/2025 1:02:17 PM CDT

## 2025-06-13 NOTE — TELEPHONE ENCOUNTER
Refill Encounter  Allergies: Confirmed    PCP Visits:   Recent Visits  Date Type Provider Dept   11/19/24 Office Visit Jayna Mohan MD [facility] Internal Medicine   08/13/24 Office Visit Jayna Mohan MD [facility] Internal Medicine   08/09/24 Office Visit Jayna Mohan MD [facility] Internal Medicine   Showing recent visits within past 360 days and meeting all other requirements  Future Appointments  Date Type Provider Dept   07/10/25 Appointment Jayna Mohan MD [facility] Internal Medicine   Showing future appointments within next 720 days and meeting all other requirements     Last 3 Blood Pressure:   BP Readings from Last 3 Encounters:   05/19/25 122/84   11/19/24 115/79   10/07/24 134/78     Preferred Pharmacy:   [facility] #[id number] & [facility]  [address] [facility] [address]  Phone: [phone] Fax: [phone]    Requested RX:  Requested Prescriptions     Pending Prescriptions Disp Refills    XARELTO 20 mg Tab [Pharmacy Med Name: XARELTO 20MG TABLETS] 90 tablet 3     Sig: TAKE 1 TABLET BY MOUTH EVERY EVENING      RX Route: Normal

## 2025-06-17 ENCOUNTER — TELEPHONE (OUTPATIENT)
Dept: INTERNAL MEDICINE | Facility: CLINIC | Age: 75
End: 2025-06-17
Payer: MEDICARE

## 2025-06-17 NOTE — TELEPHONE ENCOUNTER
----- Message from Med Assistant Doss sent at 6/10/2025  3:28 PM CDT -----  Regarding: FW: Rachelle    ----- Message -----  From: Johnny Hess  Sent: 6/10/2025   3:00 PM CDT  To: Kimberlee Dorantes Staff  Subject: Rachelle                                          Type: Patient Callback     Who called: Rachelle     What is the request in detail: Patient stated that she would like for the assistant Pia to give her a call concerning her medication. Please reach out to the patient as soon as possible.     Can the clinic reply by [facility]? Yes     Would the patient rather a call back or a response via [facility]? Callback     [facility] call back number: [phone]      Additional Information:

## 2025-07-10 ENCOUNTER — OFFICE VISIT (OUTPATIENT)
Dept: INTERNAL MEDICINE | Facility: CLINIC | Age: 75
End: 2025-07-10
Attending: INTERNAL MEDICINE
Payer: MEDICARE

## 2025-07-10 VITALS
DIASTOLIC BLOOD PRESSURE: 80 MMHG | OXYGEN SATURATION: 97 % | BODY MASS INDEX: 33.01 KG/M2 | WEIGHT: 210.31 LBS | HEIGHT: 67 IN | HEART RATE: 82 BPM | SYSTOLIC BLOOD PRESSURE: 120 MMHG

## 2025-07-10 DIAGNOSIS — I42.8 NONISCHEMIC CARDIOMYOPATHY: ICD-10-CM

## 2025-07-10 DIAGNOSIS — E66.9 OBESITY (BMI 30-39.9): ICD-10-CM

## 2025-07-10 DIAGNOSIS — D53.9 NUTRITIONAL ANEMIA, UNSPECIFIED: ICD-10-CM

## 2025-07-10 DIAGNOSIS — E55.9 VITAMIN D DEFICIENCY: ICD-10-CM

## 2025-07-10 DIAGNOSIS — M85.80 OSTEOPENIA, UNSPECIFIED LOCATION: ICD-10-CM

## 2025-07-10 DIAGNOSIS — Z51.81 MEDICATION MONITORING ENCOUNTER: Primary | ICD-10-CM

## 2025-07-10 DIAGNOSIS — G47.33 OSA (OBSTRUCTIVE SLEEP APNEA): ICD-10-CM

## 2025-07-10 DIAGNOSIS — Z71.3 ENCOUNTER FOR WEIGHT LOSS COUNSELING: ICD-10-CM

## 2025-07-10 DIAGNOSIS — R73.01 IFG (IMPAIRED FASTING GLUCOSE): ICD-10-CM

## 2025-07-10 DIAGNOSIS — I48.11 LONGSTANDING PERSISTENT ATRIAL FIBRILLATION: ICD-10-CM

## 2025-07-10 DIAGNOSIS — E78.5 HYPERLIPIDEMIA, UNSPECIFIED HYPERLIPIDEMIA TYPE: ICD-10-CM

## 2025-07-10 DIAGNOSIS — I10 ESSENTIAL HYPERTENSION, BENIGN: Chronic | ICD-10-CM

## 2025-07-10 DIAGNOSIS — R53.83 FATIGUE, UNSPECIFIED TYPE: ICD-10-CM

## 2025-07-10 PROCEDURE — 3079F DIAST BP 80-89 MM HG: CPT | Mod: CPTII,S$GLB,, | Performed by: INTERNAL MEDICINE

## 2025-07-10 PROCEDURE — 1159F MED LIST DOCD IN RCRD: CPT | Mod: CPTII,S$GLB,, | Performed by: INTERNAL MEDICINE

## 2025-07-10 PROCEDURE — 3288F FALL RISK ASSESSMENT DOCD: CPT | Mod: CPTII,S$GLB,, | Performed by: INTERNAL MEDICINE

## 2025-07-10 PROCEDURE — 99215 OFFICE O/P EST HI 40 MIN: CPT | Mod: S$GLB,,, | Performed by: INTERNAL MEDICINE

## 2025-07-10 PROCEDURE — 1101F PT FALLS ASSESS-DOCD LE1/YR: CPT | Mod: CPTII,S$GLB,, | Performed by: INTERNAL MEDICINE

## 2025-07-10 PROCEDURE — 1160F RVW MEDS BY RX/DR IN RCRD: CPT | Mod: CPTII,S$GLB,, | Performed by: INTERNAL MEDICINE

## 2025-07-10 PROCEDURE — 99999 PR PBB SHADOW E&M-EST. PATIENT-LVL IV: CPT | Mod: PBBFAC,,, | Performed by: INTERNAL MEDICINE

## 2025-07-10 PROCEDURE — G2211 COMPLEX E/M VISIT ADD ON: HCPCS | Mod: S$GLB,,, | Performed by: INTERNAL MEDICINE

## 2025-07-10 PROCEDURE — 3074F SYST BP LT 130 MM HG: CPT | Mod: CPTII,S$GLB,, | Performed by: INTERNAL MEDICINE

## 2025-07-10 PROCEDURE — 4010F ACE/ARB THERAPY RXD/TAKEN: CPT | Mod: CPTII,S$GLB,, | Performed by: INTERNAL MEDICINE

## 2025-07-10 PROCEDURE — 1126F AMNT PAIN NOTED NONE PRSNT: CPT | Mod: CPTII,S$GLB,, | Performed by: INTERNAL MEDICINE

## 2025-07-10 PROCEDURE — 3008F BODY MASS INDEX DOCD: CPT | Mod: CPTII,S$GLB,, | Performed by: INTERNAL MEDICINE

## 2025-07-10 RX ORDER — TIRZEPATIDE 12.5 MG/.5ML
12.5 INJECTION, SOLUTION SUBCUTANEOUS
Qty: 2 ML | Refills: 2 | Status: SHIPPED | OUTPATIENT
Start: 2025-07-10 | End: 2025-08-09

## 2025-07-10 RX ORDER — TIRZEPATIDE 15 MG/.5ML
15 INJECTION, SOLUTION SUBCUTANEOUS
Qty: 4 PEN | Refills: 3 | Status: SHIPPED | OUTPATIENT
Start: 2025-07-10 | End: 2025-07-10

## 2025-07-10 NOTE — PROGRESS NOTES
Subjective:   Patient ID: Rachelle Currie is a 75 y.o. female  Chief complaint:   Chief Complaint   Patient presents with    medication     Concerned about having fatty liver       HPI  Pt here for htn follow up and medication monitoring - tirzepatide     75F with pAfib, HLD, obeisty, chornic diast HF, osteopeina, obesity, vit d def, MDD:    Procedure scheduled for 10/3/2025  Saw derm and urology 5/2025  cards 9/2024 (jasmyn) and no longer seeing Dr. Hyman in [place]     Here for tirz f/u   Over a 1000$/mth as not covered by insurance   - was able to move up to 15mg dose x 1 month and ran out and not refilled due to cost   Prev:  Lost 11 pounds over past few months - cutting back - discussed inc dose - neris 7.5   Discussed inc dose and further titration based on response   Smaller portions     BARRIE:  Tested twice with barrie prior to moving to [facility]   - due to est with sleep in [facility] and revisit cpap use and monitoring  Prev:  - due for follow up to complete sleep study   - to be rescheduled     Only med takign at this time are losartan and crestor and xarelto     HTN:  - c/w med per med card - on arb  - controlled today   - neris meds     Afib s/p ablation, cardiomyopathy - systolic and diastolic HF:   Followed by cards - EP cards and gen Cards   Echo 7/2023  Taking xarelto - not on bb or digoxin   At [facility]:   Followed by cards in [place], [place] prev and then [place] [facility] - no obstruction  - est care with Dr. Goldstein - [facility] 3/2024  - canceled ablation for later date - to be completed this year     Recurrent utis:   Reports more episodes of uti   Drinking concentrated cranberry juice with water   Seen providers in past year 4-5 - out of town so no culture date to review   Discussed urogyn eval     Vit d def:  - c/w vit d currently 3000mg    OA of knee   To have TKA in future - on hold for now   Not getting exercise     Osteopenia:   Dexa 3/2022  Not c/w vit d suppl     HLD:  - C/w statin     Ifg:   Not on metfomrin   - prev on  "glp1a for weight loss     Obesity:   Improved iwht rx   Discussed med rx options - ordering through [facility]   No hx of pancreatitis   No personal or fam hx of men2, mtc    Hx of cva  Ct head 2022   On statin and xarelto     HM:   Cscope utd 3/2024  Mmg utd 12/2024  Dexa utd 12/2024 - penia and penia on prior 3/2022  Rsv  Covid   Flu in fall     Cards: Smooth Goldstein  Sleep: jeanna  Urology: osman    Review of Systems    Objective:  Vitals:    07/10/25 1045   BP: 120/80   Pulse: 82   SpO2: 97%   Weight: 95.4 kg (210 lb 5.1 oz)   Height: 5' 7" (1.702 m)     Body mass index is 32.94 kg/m².    Physical Exam  Constitutional:       General: She is not in acute distress.     Appearance: She is well-developed. She is not diaphoretic.   Eyes:      General:         Right eye: No discharge.         Left eye: No discharge.      Extraocular Movements: Extraocular movements intact.      Conjunctiva/sclera: Conjunctivae normal.   Cardiovascular:      Rate and Rhythm: Normal rate.      Pulses: Normal pulses.      Heart sounds: Normal heart sounds.   Pulmonary:      Effort: Pulmonary effort is normal. No respiratory distress.   Musculoskeletal:         General: No swelling or tenderness.   Skin:     General: Skin is warm.      Capillary Refill: Capillary refill takes less than 2 seconds.      Findings: No erythema.   Neurological:      Mental Status: She is alert and oriented to person, place, and time. Mental status is at baseline.   Psychiatric:         Behavior: Behavior normal.         Thought Content: Thought content normal.         Judgment: Judgment normal.       Assessment:  1. Medication monitoring encounter    2. BARRIE (obstructive sleep apnea)    3. Longstanding persistent atrial fibrillation    4. Obesity (BMI 30-39.9)    5. Encounter for weight loss counseling    6. Nonischemic cardiomyopathy    7. Fatigue, unspecified type    8. Nutritional anemia, unspecified    9. Essential hypertension, benign    10. Osteopenia, " unspecified location    11. IFG (impaired fasting glucose)    12. Vitamin D deficiency    13. Hyperlipidemia, unspecified hyperlipidemia type        Medication monitoring encounter    BARRIE (obstructive sleep apnea)  -     Ambulatory referral/consult to Sleep Disorders; Future; Expected date: 07/17/2025  -     Discontinue: tirzepatide, weight loss, (ZEPBOUND) 15 mg/0.5 mL PnIj; Inject 15 mg into the skin every 7 days.  Dispense: 4 Pen; Refill: 3  -     tirzepatide, weight loss, (ZEPBOUND) 12.5 mg/0.5 mL PnIj; Inject 12.5 mg into the skin every 7 days.  Dispense: 2 mL; Refill: 2    Longstanding persistent atrial fibrillation  -     Ambulatory referral/consult to Cardiology; Future; Expected date: 07/17/2025    Obesity (BMI 30-39.9)  -     Discontinue: tirzepatide, weight loss, (ZEPBOUND) 15 mg/0.5 mL PnIj; Inject 15 mg into the skin every 7 days.  Dispense: 4 Pen; Refill: 3  -     tirzepatide, weight loss, (ZEPBOUND) 12.5 mg/0.5 mL PnIj; Inject 12.5 mg into the skin every 7 days.  Dispense: 2 mL; Refill: 2    Encounter for weight loss counseling  -     Discontinue: tirzepatide, weight loss, (ZEPBOUND) 15 mg/0.5 mL PnIj; Inject 15 mg into the skin every 7 days.  Dispense: 4 Pen; Refill: 3  -     tirzepatide, weight loss, (ZEPBOUND) 12.5 mg/0.5 mL PnIj; Inject 12.5 mg into the skin every 7 days.  Dispense: 2 mL; Refill: 2    Nonischemic cardiomyopathy  -     Discontinue: tirzepatide, weight loss, (ZEPBOUND) 15 mg/0.5 mL PnIj; Inject 15 mg into the skin every 7 days.  Dispense: 4 Pen; Refill: 3  -     tirzepatide, weight loss, (ZEPBOUND) 12.5 mg/0.5 mL PnIj; Inject 12.5 mg into the skin every 7 days.  Dispense: 2 mL; Refill: 2    Fatigue, unspecified type  -     Vitamin B12; Future; Expected date: 07/10/2025    Nutritional anemia, unspecified  -     Vitamin B12; Future; Expected date: 07/10/2025    Essential hypertension, benign    Osteopenia, unspecified location    IFG (impaired fasting glucose)    Vitamin D  deficiency    Hyperlipidemia, unspecified hyperlipidemia type    - cont diet and exercise  - increase intensity and duration of CV exercise to continue weight loss  - goal wt loss one pound per week  - portion control, healthy choices    Recommend daily sunscreen, cardiovascular exercise min 30 min 5 days per week. Seatbelts routinely.  Reviewed recommended health maintenance and recommended vaccines   Check/reviewed appropriate labs     F/u with speicalists - cards and sleep     Today:   Use [facility] for tirz - if not covered will move to [facility] for 10mg dose and inc when higher doses become available oop of affordable    Visit today included increased complexity associated with the care of the episodic problem htn, obesity addressed and managing the longitudinal care of the patient due to the serious and/or complex managed problem(s) afib, mandi, hld.    Health Maintenance   Topic Date Due    RSV Vaccine (Age 60+ and Pregnant patients) (1 - Risk 60-74 years 1-dose series) Never done    Hemoglobin A1c (Prediabetes)  06/15/2024    COVID-19 Vaccine (6 - 2024-25 season) 09/01/2024    Influenza Vaccine (1) 09/01/2025    Mammogram  12/16/2025    DEXA Scan  12/16/2026    Lipid Panel  06/15/2028    Colorectal Cancer Screening  03/21/2029    TETANUS VACCINE  03/14/2032    Hepatitis C Screening  Completed    Shingles Vaccine  Completed    Pneumococcal Vaccines (Age 50+)  Completed     50 min spent in care of patient including chart review, history, orders, physical, orders and coordination of care

## 2025-07-11 ENCOUNTER — LAB VISIT (OUTPATIENT)
Dept: LAB | Facility: OTHER | Age: 75
End: 2025-07-11
Attending: INTERNAL MEDICINE
Payer: MEDICARE

## 2025-07-11 ENCOUNTER — RESULTS FOLLOW-UP (OUTPATIENT)
Dept: INTERNAL MEDICINE | Facility: CLINIC | Age: 75
End: 2025-07-11
Payer: MEDICARE

## 2025-07-11 DIAGNOSIS — E55.9 VITAMIN D DEFICIENCY: ICD-10-CM

## 2025-07-11 DIAGNOSIS — R53.83 FATIGUE, UNSPECIFIED TYPE: ICD-10-CM

## 2025-07-11 DIAGNOSIS — I48.91 ATRIAL FIBRILLATION, UNSPECIFIED TYPE: ICD-10-CM

## 2025-07-11 DIAGNOSIS — D53.9 NUTRITIONAL ANEMIA, UNSPECIFIED: ICD-10-CM

## 2025-07-11 DIAGNOSIS — R73.01 IFG (IMPAIRED FASTING GLUCOSE): ICD-10-CM

## 2025-07-11 DIAGNOSIS — L60.0 INGROWN TOENAIL: ICD-10-CM

## 2025-07-11 DIAGNOSIS — E78.5 HYPERLIPIDEMIA, UNSPECIFIED HYPERLIPIDEMIA TYPE: ICD-10-CM

## 2025-07-11 DIAGNOSIS — E55.9 VITAMIN D DEFICIENCY: Primary | ICD-10-CM

## 2025-07-11 LAB
25(OH)D3+25(OH)D2 SERPL-MCNC: 22 NG/ML (ref 30–96)
ABSOLUTE EOSINOPHIL (OHS): 0.15 K/UL
ABSOLUTE MONOCYTE (OHS): 0.49 K/UL (ref 0.3–1)
ABSOLUTE NEUTROPHIL COUNT (OHS): 3.7 K/UL (ref 1.8–7.7)
ALBUMIN SERPL BCP-MCNC: 3.7 G/DL (ref 3.5–5.2)
ALP SERPL-CCNC: 92 UNIT/L (ref 40–150)
ALT SERPL W/O P-5'-P-CCNC: 26 UNIT/L (ref 10–44)
ANION GAP (OHS): 11 MMOL/L (ref 8–16)
AST SERPL-CCNC: 24 UNIT/L (ref 11–45)
BASOPHILS # BLD AUTO: 0.05 K/UL
BASOPHILS NFR BLD AUTO: 0.7 %
BILIRUB SERPL-MCNC: 0.6 MG/DL (ref 0.1–1)
BUN SERPL-MCNC: 19 MG/DL (ref 8–23)
CALCIUM SERPL-MCNC: 9.4 MG/DL (ref 8.7–10.5)
CHLORIDE SERPL-SCNC: 109 MMOL/L (ref 95–110)
CHOLEST SERPL-MCNC: 139 MG/DL (ref 120–199)
CHOLEST/HDLC SERPL: 2.2 {RATIO} (ref 2–5)
CO2 SERPL-SCNC: 20 MMOL/L (ref 23–29)
CREAT SERPL-MCNC: 0.9 MG/DL (ref 0.5–1.4)
EAG (OHS): 108 MG/DL (ref 68–131)
ERYTHROCYTE [DISTWIDTH] IN BLOOD BY AUTOMATED COUNT: 14.7 % (ref 11.5–14.5)
GFR SERPLBLD CREATININE-BSD FMLA CKD-EPI: >60 ML/MIN/1.73/M2
GLUCOSE SERPL-MCNC: 91 MG/DL (ref 70–110)
HBA1C MFR BLD: 5.4 % (ref 4–5.6)
HCT VFR BLD AUTO: 45.9 % (ref 37–48.5)
HDLC SERPL-MCNC: 63 MG/DL (ref 40–75)
HDLC SERPL: 45.3 % (ref 20–50)
HGB BLD-MCNC: 14.9 GM/DL (ref 12–16)
IMM GRANULOCYTES # BLD AUTO: 0.01 K/UL (ref 0–0.04)
IMM GRANULOCYTES NFR BLD AUTO: 0.1 % (ref 0–0.5)
LDLC SERPL CALC-MCNC: 59 MG/DL (ref 63–159)
LYMPHOCYTES # BLD AUTO: 2.3 K/UL (ref 1–4.8)
MCH RBC QN AUTO: 30.5 PG (ref 27–31)
MCHC RBC AUTO-ENTMCNC: 32.5 G/DL (ref 32–36)
MCV RBC AUTO: 94 FL (ref 82–98)
NONHDLC SERPL-MCNC: 76 MG/DL
NUCLEATED RBC (/100WBC) (OHS): 0 /100 WBC
PLATELET # BLD AUTO: 275 K/UL (ref 150–450)
PMV BLD AUTO: 9.8 FL (ref 9.2–12.9)
POTASSIUM SERPL-SCNC: 4.6 MMOL/L (ref 3.5–5.1)
PROT SERPL-MCNC: 8.1 GM/DL (ref 6–8.4)
RBC # BLD AUTO: 4.89 M/UL (ref 4–5.4)
RELATIVE EOSINOPHIL (OHS): 2.2 %
RELATIVE LYMPHOCYTE (OHS): 34.3 % (ref 18–48)
RELATIVE MONOCYTE (OHS): 7.3 % (ref 4–15)
RELATIVE NEUTROPHIL (OHS): 55.4 % (ref 38–73)
SODIUM SERPL-SCNC: 140 MMOL/L (ref 136–145)
TRIGL SERPL-MCNC: 85 MG/DL (ref 30–150)
TSH SERPL-ACNC: 2.03 UIU/ML (ref 0.4–4)
VIT B12 SERPL-MCNC: 925 PG/ML (ref 210–950)
WBC # BLD AUTO: 6.7 K/UL (ref 3.9–12.7)

## 2025-07-11 PROCEDURE — 82306 VITAMIN D 25 HYDROXY: CPT

## 2025-07-11 PROCEDURE — 36415 COLL VENOUS BLD VENIPUNCTURE: CPT

## 2025-07-11 PROCEDURE — 85025 COMPLETE CBC W/AUTO DIFF WBC: CPT

## 2025-07-11 PROCEDURE — 82465 ASSAY BLD/SERUM CHOLESTEROL: CPT

## 2025-07-11 PROCEDURE — 84443 ASSAY THYROID STIM HORMONE: CPT

## 2025-07-11 PROCEDURE — 83036 HEMOGLOBIN GLYCOSYLATED A1C: CPT

## 2025-07-11 PROCEDURE — 80053 COMPREHEN METABOLIC PANEL: CPT

## 2025-07-11 PROCEDURE — 82607 VITAMIN B-12: CPT

## 2025-07-11 RX ORDER — ERGOCALCIFEROL 1.25 MG/1
50000 CAPSULE ORAL
Qty: 12 CAPSULE | Refills: 0 | Status: SHIPPED | OUTPATIENT
Start: 2025-07-11 | End: 2025-10-09

## 2025-07-25 ENCOUNTER — OFFICE VISIT (OUTPATIENT)
Dept: PODIATRY | Facility: CLINIC | Age: 75
End: 2025-07-25
Attending: INTERNAL MEDICINE
Payer: MEDICARE

## 2025-07-25 VITALS — HEIGHT: 67 IN | BODY MASS INDEX: 33.01 KG/M2 | WEIGHT: 210.31 LBS

## 2025-07-25 DIAGNOSIS — M72.2 PLANTAR FASCIITIS: ICD-10-CM

## 2025-07-25 DIAGNOSIS — M79.675 CHRONIC TOE PAIN, BILATERAL: Primary | ICD-10-CM

## 2025-07-25 DIAGNOSIS — G89.29 CHRONIC TOE PAIN, BILATERAL: Primary | ICD-10-CM

## 2025-07-25 DIAGNOSIS — M24.573 EQUINUS CONTRACTURE OF ANKLE: ICD-10-CM

## 2025-07-25 DIAGNOSIS — L60.0 INGROWN TOENAIL: ICD-10-CM

## 2025-07-25 DIAGNOSIS — M79.671 FOOT PAIN, RIGHT: ICD-10-CM

## 2025-07-25 DIAGNOSIS — M79.674 CHRONIC TOE PAIN, BILATERAL: Primary | ICD-10-CM

## 2025-07-25 PROCEDURE — 99999 PR PBB SHADOW E&M-EST. PATIENT-LVL III: CPT | Mod: PBBFAC,,, | Performed by: PODIATRIST

## 2025-07-25 RX ORDER — LIDOCAINE AND PRILOCAINE 25; 25 MG/G; MG/G
CREAM TOPICAL
Qty: 30 G | Refills: 3 | Status: SHIPPED | OUTPATIENT
Start: 2025-07-25

## 2025-07-25 NOTE — PROGRESS NOTES
Subjective:      Patient ID: Rachelle Currie is a 75 y.o. female.    Chief Complaint: Ingrown Toenail and Plantar Fasciitis    Sharp deep pain in the bottom of the heel and the side of the right heel right foot.  Gradual onset, worsening over the past several months but present chronically for over a year on and off.  Aggravated with increased weight-bearing particularly after rest.  No prior medical treatment.  No self-treatment.  Denies trauma and surgery both feet.  Patient does use of rebound or which is essentially a trampoline on what she jumps for exercise.      Cc2  periodic sharp and throbbing pain both big toes/nails.  Gradual onset, worsening and improving in cycles over many years.  No prior medical treatment.  Self-treatment with pedicures seems to adequately control the symptoms for reasonable intervals.  Denies trauma and surgery both feet    Review of Systems   Constitutional: Negative for chills, diaphoresis, fever, malaise/fatigue and night sweats.   Cardiovascular:  Negative for claudication, cyanosis, leg swelling and syncope.   Skin:  Positive for nail changes. Negative for color change, dry skin, rash, suspicious lesions and unusual hair distribution.   Musculoskeletal:  Negative for falls, joint pain, joint swelling, muscle cramps, muscle weakness and stiffness.   Gastrointestinal:  Negative for constipation, diarrhea, nausea and vomiting.   Neurological:  Negative for brief paralysis, disturbances in coordination, focal weakness, numbness, paresthesias, sensory change and tremors.           Objective:      Physical Exam  Constitutional:       General: She is not in acute distress.     Appearance: She is well-developed. She is not diaphoretic.   Cardiovascular:      Pulses:           Popliteal pulses are 2+ on the right side and 2+ on the left side.        Dorsalis pedis pulses are 2+ on the right side and 2+ on the left side.        Posterior tibial pulses are 2+ on the right side and 2+ on  the left side.      Comments: Capillary refill 3 seconds all toes/distal feet, all toes/both feet warm to touch.      Negative lymphadenopathy bilateral popliteal fossa and tarsal tunnel.      Negavie lower extremity edema bilateral.    Musculoskeletal:      Right ankle: No swelling, deformity, ecchymosis or lacerations. Normal range of motion. Normal pulse.      Right Achilles Tendon: Normal. No defects. Kevin's test negative.      Comments: Sharp deep pain to palpation inferior heel right at medial calcaneal tubercle without ecchymosis, erythema, edema, or cardinal signs infection, and no signs of trauma.    Ankle dorsiflexion decreased at <10 degrees bilateral with moderate increase with knee flexion bilateral.    Otherwise, Normal angle, base, station of gait. All ten toes without clubbing, cyanosis, or signs of ischemia.  No pain to palpation bilateral lower extremities.  Range of motion, stability, muscle strength, and muscle tone normal bilateral feet and legs.    Lymphadenopathy:      Lower Body: No right inguinal adenopathy. No left inguinal adenopathy.      Comments: Negative lymphadenopathy bilateral popliteal fossa and tarsal tunnel.    Negative lymphangitic streaking bilateral feet/ankles/legs.   Skin:     General: Skin is warm and dry.      Capillary Refill: Capillary refill takes 2 to 3 seconds.      Coloration: Skin is not pale.      Findings: No abrasion, bruising, burn, ecchymosis, erythema, laceration, lesion or rash.      Nails: There is no clubbing.      Comments: Skin is normal age and health appropriate color, turgor, texture, and temperature bilateral lower extremities without ulceration, hyperpigmentation, discoloration, masses nodules or cords palpated.  No ecchymosis, erythema, edema, or cardinal signs of infection bilateral lower extremities.    Right and left hallux toenails have deep incurvation of the mediolateral borders without periungual skin abnormality, pain, open skin drainage  pus tracking fluctuance malodor signs of infection right or left hallux.     Neurological:      Mental Status: She is alert and oriented to person, place, and time.      Sensory: No sensory deficit.      Motor: No tremor, atrophy or abnormal muscle tone.      Gait: Gait normal.      Comments: Negative allodynia both feet    Negative tinel sign to percussion sural, superficial peroneal, deep peroneal, saphenous, and posterior tibial nerves right and left ankles and feet.    Psychiatric:         Behavior: Behavior is cooperative.           Assessment:       Encounter Diagnoses   Name Primary?    Ingrown toenail     Chronic toe pain, bilateral Yes    Plantar fasciitis     Foot pain, right     Equinus contracture of ankle          Plan:       Rachelle was seen today for ingrown toenail and plantar fasciitis.    Diagnoses and all orders for this visit:    Chronic toe pain, bilateral    Ingrown toenail  -     Ambulatory referral/consult to Podiatry    Plantar fasciitis    Foot pain, right    Equinus contracture of ankle    Other orders  -     LIDOcaine-prilocaine (EMLA) cream; Apply topically as needed.      I counseled the patient on her conditions, their implications and medical management.        Stop rebound or for fear of worsening injury and chronic infant [place] symptoms of the heel.      Patient will stretch the tendo achilles complex three times daily as demonstrated in the office.  Literature was dispensed illustrating proper stretching technique.    Patient will obtain over the counter arch supports and wear them in shoes whenever possible.  Athletic shoes intended for walking or running are usually best.    Discussed conservative treatment with shoes of adequate dimensions, material, and style to alleviate symptoms and delay or prevent surgical intervention.    [place]mla      Continue pedicures.  If these control the periodic symptoms of the ingrown nails there more than adequate and not dangerous with the  patient presently.        Follow up in about 1 month (around 8/25/2025).

## 2025-08-28 ENCOUNTER — PATIENT MESSAGE (OUTPATIENT)
Dept: ELECTROPHYSIOLOGY | Facility: CLINIC | Age: 75
End: 2025-08-28
Payer: MEDICARE